# Patient Record
Sex: FEMALE | Race: OTHER | HISPANIC OR LATINO | Employment: UNEMPLOYED | URBAN - METROPOLITAN AREA
[De-identification: names, ages, dates, MRNs, and addresses within clinical notes are randomized per-mention and may not be internally consistent; named-entity substitution may affect disease eponyms.]

---

## 2018-04-09 ENCOUNTER — OFFICE VISIT (OUTPATIENT)
Dept: OBGYN CLINIC | Facility: HOSPITAL | Age: 28
End: 2018-04-09
Payer: COMMERCIAL

## 2018-04-09 VITALS
HEIGHT: 66 IN | SYSTOLIC BLOOD PRESSURE: 114 MMHG | WEIGHT: 125 LBS | HEART RATE: 87 BPM | BODY MASS INDEX: 20.09 KG/M2 | DIASTOLIC BLOOD PRESSURE: 74 MMHG

## 2018-04-09 DIAGNOSIS — R10.2 PELVIC PAIN IN FEMALE: ICD-10-CM

## 2018-04-09 DIAGNOSIS — K29.00 ACUTE GASTRITIS WITHOUT HEMORRHAGE, UNSPECIFIED GASTRITIS TYPE: Primary | ICD-10-CM

## 2018-04-09 PROBLEM — K29.70 GASTRITIS: Status: ACTIVE | Noted: 2018-04-09

## 2018-04-09 LAB — SL AMB POCT URINE HCG: NORMAL

## 2018-04-09 PROCEDURE — 99213 OFFICE O/P EST LOW 20 MIN: CPT | Performed by: OBSTETRICS & GYNECOLOGY

## 2018-04-09 PROCEDURE — 81025 URINE PREGNANCY TEST: CPT | Performed by: OBSTETRICS & GYNECOLOGY

## 2018-04-09 RX ORDER — OMEPRAZOLE 40 MG/1
40 CAPSULE, DELAYED RELEASE ORAL DAILY
Qty: 14 CAPSULE | Refills: 0 | Status: SHIPPED | OUTPATIENT
Start: 2018-04-09 | End: 2018-07-28 | Stop reason: ALTCHOICE

## 2018-04-09 NOTE — PATIENT INSTRUCTIONS
Diet for Stomach Ulcers and Gastritis   AMBULATORY CARE:   A diet for stomach ulcers and gastritis  is a meal plan that limits foods that irritate your stomach  Certain foods may worsen symptoms such as stomach pain, bloating, heartburn, or indigestion  Foods to limit or avoid:  You may need to avoid acidic, spicy, or high-fat foods  Not all foods affect everyone the same way  You will need to learn which foods worsen your symptoms and limit those foods  The following are some foods that may worsen ulcer or gastritis symptoms:  · Beverages:      ¨ Whole milk and chocolate milk    ¨ Hot cocoa and cola    ¨ Any beverage with caffeine    ¨ Regular and decaffeinated coffee    ¨ Peppermint and spearmint tea    ¨ Green and black tea, with or without caffeine    ¨ Orange and grapefruit juices    ¨ Drinks that contain alcohol    · Spices and seasonings:      ¨ Black and red pepper    ¨ Chili powder    ¨ Mustard seed and nutmeg    · Other foods:      ¨ Dairy foods made from whole milk or cream    ¨ Chocolate    ¨ Spicy or strongly flavored cheeses, such as jalapeno or black pepper    ¨ Highly seasoned, high-fat meats, such as sausage, salami, mae, ham, and cold cuts    ¨ Hot chiles and peppers    ¨ Tomato products, such as tomato paste, tomato sauce, or tomato juice  Foods to include:  Eat a variety of healthy foods from all the food groups  Eat fruits, vegetables, whole grains, and fat-free or low-fat dairy foods  Whole grains include whole-wheat breads, cereals, pasta, and brown rice  Choose lean meats, poultry (chicken and turkey), fish, beans, eggs, and nuts  A healthy meal plan is low in unhealthy fats, salt, and added sugar  Healthy fats include olive oil and canola oil  Ask your dietitian for more information about a healthy diet  Other helpful guidelines:   · Do not eat right before bedtime  Stop eating at least 2 hours before bedtime  · Eat small, frequent meals    Your stomach may tolerate small, frequent meals better than large meals  © 2017 [facility] [facility] Information is for End User's use only and may not be sold, redistributed or otherwise used for commercial purposes  All illustrations and images included in CareNotes® are the copyrighted property of A D A M [employer]  or [employer]  The above information is an  only  It is not intended as medical advice for individual conditions or treatments  Talk to your doctor, nurse or pharmacist before following any medical regimen to see if it is safe and effective for you

## 2018-04-09 NOTE — PROGRESS NOTES
34 yo female presenting with upper abdominal pain following eating pizza  Patient states 2 weeks ago she began having pain she localizes to her epigastric area which is exacerbated by eating "red sauce foods"  Patient rates the pain an 8/10 when the pain is there and does not radiate  It is associated with nausea  She has tried to take Tylenol and Motrin for the pain but does not make the pain worse  She denies any fevers, chills, diarrhea, constipation or dysuria  Patient states patient not have this problem after eating greasy foods or bland foods only when she is eating foods that contain as acid  Patient denies any current pain  Patient denies any medical problems or taking any medications other then Tylenol and Motrin  Patient is not have a primary care physician  /74 (BP Location: Left arm, Patient Position: Sitting, Cuff Size: Standard)   Pulse 87   Ht 5' 6" (1 676 m)   Wt 56 7 kg (125 lb)   LMP 03/29/2018 (Exact Date)   BMI 20 18 kg/m²   General:  Appears common in no acute distress  Abdomen:  Soft, nontender, no tenderness in all 4 quadrants, no rebound or guarding, negative Tobias sign, bowel sounds present    A/P:   56-year-old female presenting with epigastric pain most likely consistent with gastritis or peptic ulcer disease  - Omeprazole 40mg PO daily for 14 days Rx given  - Discussed dietary changes and avoid foods that containing acid, coffee, chocolate or regular NSAID use  Recommended healthy diet with abundant vegetables    - Ambulatory referral to family medicine for patient to establish care made- referral given  - If symptoms worsen or patient begins to have fever or chills with sx to present to ED for further evaluation  - To follow up for annual exam or as needed for OB or GYN related problems    D/w Dr Bethany Bocanegra MD

## 2018-07-28 ENCOUNTER — HOSPITAL ENCOUNTER (EMERGENCY)
Facility: HOSPITAL | Age: 28
Discharge: HOME | End: 2018-07-28
Attending: EMERGENCY MEDICINE | Admitting: EMERGENCY MEDICINE
Payer: COMMERCIAL

## 2018-07-28 ENCOUNTER — APPOINTMENT (EMERGENCY)
Dept: RADIOLOGY | Facility: HOSPITAL | Age: 28
End: 2018-07-28
Payer: COMMERCIAL

## 2018-07-28 VITALS
TEMPERATURE: 97.5 F | WEIGHT: 110 LBS | BODY MASS INDEX: 17.68 KG/M2 | RESPIRATION RATE: 18 BRPM | OXYGEN SATURATION: 93 % | DIASTOLIC BLOOD PRESSURE: 65 MMHG | HEIGHT: 66 IN | SYSTOLIC BLOOD PRESSURE: 115 MMHG | HEART RATE: 75 BPM

## 2018-07-28 DIAGNOSIS — N73.9 PELVIC INFLAMMATORY DISEASE (PID): Primary | ICD-10-CM

## 2018-07-28 LAB
BACTERIA UR QL AUTO: ABNORMAL /HPF
BASOPHILS # BLD AUTO: 0.05 THOUSANDS/ΜL (ref 0–0.1)
BASOPHILS NFR BLD AUTO: 1 % (ref 0–1)
BILIRUB UR QL STRIP: NEGATIVE
CLARITY UR: CLEAR
COLOR UR: YELLOW
COLOR, POC: ABNORMAL
EOSINOPHIL # BLD AUTO: 0.42 THOUSAND/ΜL (ref 0–0.61)
EOSINOPHIL NFR BLD AUTO: 5 % (ref 0–6)
ERYTHROCYTE [DISTWIDTH] IN BLOOD BY AUTOMATED COUNT: 12 % (ref 11.6–15.1)
EXT PREG TEST URINE: NEGATIVE
GLUCOSE UR STRIP-MCNC: NEGATIVE MG/DL
HCT VFR BLD AUTO: 40.1 % (ref 34.8–46.1)
HGB BLD-MCNC: 12.7 G/DL (ref 11.5–15.4)
HGB UR QL STRIP.AUTO: NEGATIVE
HYALINE CASTS #/AREA URNS LPF: ABNORMAL /LPF
IMM GRANULOCYTES # BLD AUTO: 0.02 THOUSAND/UL (ref 0–0.2)
IMM GRANULOCYTES NFR BLD AUTO: 0 % (ref 0–2)
KETONES UR STRIP-MCNC: NEGATIVE MG/DL
LEUKOCYTE ESTERASE UR QL STRIP: ABNORMAL
LYMPHOCYTES # BLD AUTO: 3.55 THOUSANDS/ΜL (ref 0.6–4.47)
LYMPHOCYTES NFR BLD AUTO: 43 % (ref 14–44)
MCH RBC QN AUTO: 29.3 PG (ref 26.8–34.3)
MCHC RBC AUTO-ENTMCNC: 31.7 G/DL (ref 31.4–37.4)
MCV RBC AUTO: 93 FL (ref 82–98)
MONOCYTES # BLD AUTO: 0.39 THOUSAND/ΜL (ref 0.17–1.22)
MONOCYTES NFR BLD AUTO: 5 % (ref 4–12)
NEUTROPHILS # BLD AUTO: 3.77 THOUSANDS/ΜL (ref 1.85–7.62)
NEUTS SEG NFR BLD AUTO: 46 % (ref 43–75)
NITRITE UR QL STRIP: NEGATIVE
NON-SQ EPI CELLS URNS QL MICRO: ABNORMAL /HPF
NRBC BLD AUTO-RTO: 0 /100 WBCS
PH UR STRIP.AUTO: 6 [PH] (ref 4.5–8)
PLATELET # BLD AUTO: 260 THOUSANDS/UL (ref 149–390)
PMV BLD AUTO: 10.3 FL (ref 8.9–12.7)
PROT UR STRIP-MCNC: NEGATIVE MG/DL
RBC # BLD AUTO: 4.33 MILLION/UL (ref 3.81–5.12)
RBC #/AREA URNS AUTO: ABNORMAL /HPF
SP GR UR STRIP.AUTO: >=1.03 (ref 1–1.03)
UROBILINOGEN UR QL STRIP.AUTO: 0.2 E.U./DL
WBC # BLD AUTO: 8.2 THOUSAND/UL (ref 4.31–10.16)
WBC #/AREA URNS AUTO: ABNORMAL /HPF

## 2018-07-28 PROCEDURE — 96372 THER/PROPH/DIAG INJ SC/IM: CPT

## 2018-07-28 PROCEDURE — 87591 N.GONORRHOEAE DNA AMP PROB: CPT | Performed by: EMERGENCY MEDICINE

## 2018-07-28 PROCEDURE — 81001 URINALYSIS AUTO W/SCOPE: CPT

## 2018-07-28 PROCEDURE — 87491 CHLMYD TRACH DNA AMP PROBE: CPT | Performed by: EMERGENCY MEDICINE

## 2018-07-28 PROCEDURE — 87086 URINE CULTURE/COLONY COUNT: CPT

## 2018-07-28 PROCEDURE — 99284 EMERGENCY DEPT VISIT MOD MDM: CPT

## 2018-07-28 PROCEDURE — 85025 COMPLETE CBC W/AUTO DIFF WBC: CPT | Performed by: EMERGENCY MEDICINE

## 2018-07-28 PROCEDURE — 36415 COLL VENOUS BLD VENIPUNCTURE: CPT | Performed by: EMERGENCY MEDICINE

## 2018-07-28 PROCEDURE — 76856 US EXAM PELVIC COMPLETE: CPT

## 2018-07-28 PROCEDURE — 76830 TRANSVAGINAL US NON-OB: CPT

## 2018-07-28 PROCEDURE — 81025 URINE PREGNANCY TEST: CPT | Performed by: EMERGENCY MEDICINE

## 2018-07-28 RX ORDER — AZITHROMYCIN 250 MG/1
1000 TABLET, FILM COATED ORAL ONCE
Status: DISCONTINUED | OUTPATIENT
Start: 2018-07-28 | End: 2018-07-28

## 2018-07-28 RX ORDER — DOXYCYCLINE HYCLATE 100 MG/1
100 CAPSULE ORAL ONCE
Status: COMPLETED | OUTPATIENT
Start: 2018-07-28 | End: 2018-07-28

## 2018-07-28 RX ORDER — METRONIDAZOLE 500 MG/1
500 TABLET ORAL 2 TIMES DAILY
Qty: 28 TABLET | Refills: 0 | Status: SHIPPED | OUTPATIENT
Start: 2018-07-28 | End: 2018-08-11

## 2018-07-28 RX ORDER — NAPROXEN 375 MG/1
375 TABLET ORAL 2 TIMES DAILY WITH MEALS
Qty: 20 TABLET | Refills: 0 | Status: SHIPPED | OUTPATIENT
Start: 2018-07-28 | End: 2019-06-25

## 2018-07-28 RX ORDER — DOXYCYCLINE HYCLATE 100 MG/1
100 CAPSULE ORAL 2 TIMES DAILY
Qty: 28 CAPSULE | Refills: 0 | Status: SHIPPED | OUTPATIENT
Start: 2018-07-28 | End: 2018-08-11

## 2018-07-28 RX ADMIN — HYDROMORPHONE HYDROCHLORIDE 1 MG: 1 INJECTION, SOLUTION INTRAMUSCULAR; INTRAVENOUS; SUBCUTANEOUS at 21:01

## 2018-07-28 RX ADMIN — LIDOCAINE HYDROCHLORIDE 250 MG: 10 INJECTION, SOLUTION EPIDURAL; INFILTRATION; INTRACAUDAL; PERINEURAL at 22:19

## 2018-07-28 RX ADMIN — DOXYCYCLINE 100 MG: 100 CAPSULE ORAL at 22:20

## 2018-07-28 NOTE — Clinical Note
[address] [facility] discharge to home/self care      Condition at discharge: Stable

## 2018-07-29 NOTE — ED ATTENDING ATTESTATION
I, Elle Patel DO, saw and evaluated the patient  I have discussed the patient with the resident/non-physician practitioner and agree with the resident's/non-physician practitioner's findings, Plan of Care, and MDM as documented in the resident's/non-physician practitioner's note, except where noted  All available labs and Radiology studies were reviewed  At this point I agree with the current assessment done in the Emergency Department  I have conducted an independent evaluation of this patient a history and physical is as follows:      Critical Care Time  CritCare Time    Procedures     33 yr old fem to the ED with pelvic pain for the last five days wo fever  Has clear to [facility] and boyfriend with the same  Hx of Chlamydia two years ago  No vomiting  Exm: very tender suprapubic with guarding    Pln: STD and PID eval
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
## 2018-07-29 NOTE — DISCHARGE INSTRUCTIONS
Pelvic Inflammatory Disease   WHAT YOU NEED TO KNOW:   Pelvic inflammatory disease (PID) is a condition that causes your reproductive organs to become inflamed  Your reproductive organs include your ovaries, fallopian tubes, uterus, cervix (lower area of your uterus), and vagina  PID may cause chronic (long-term) abdominal pain and problems with future pregnancies  You may have no symptoms of PID  DISCHARGE INSTRUCTIONS:   Return to the emergency department if:   · You have chills or a high fever  · You have pain in your upper right abdomen  · You have pain in your lower abdomen that does not go away with rest or medicine  · Your symptoms get worse or do not improve after 3 days of treatment  Contact your healthcare provider if:   · You have nausea or are vomiting  · Your skin is red, itchy, or you have a new rash  · You think or know you are pregnant  · You have questions or concerns about your condition or care  Medicines:   · Medicines  may be given to prevent or fight a bacterial infection or to reduce pain  Ask your healthcare provider how to take pain medicine safely  · Take your medicine as directed  Contact your healthcare provider if you think your medicine is not helping or if you have side effects  Tell him of her if you are allergic to any medicine  Keep a list of the medicines, vitamins, and herbs you take  Include the amounts, and when and why you take them  Bring the list or the pill bottles to follow-up visits  Carry your medicine list with you in case of an emergency  Manage PID:   · Finish your treatment  If you do not finish your treatment for PID, your infection may not go away  You may also have an increased risk for another STI in the future  · Do not have sex until your healthcare provider says it is okay  You will need to finish treatment before it is safe to have sex  · Do not have unprotected sex  Always use a latex condom   Do not have sex while you or your partners are being treated for an STI  · Talk to your sex partners  If you have an STI, tell your recent partners  Tell them to see a healthcare provider for testing and treatment  This will help stop the spread of infection to others or back to you  Decrease your risk for PID:   · Do not have unprotected sex  Always use a latex condom  Do not have sex while you or your partners are being treated for an STI  · Get tested for STIs  before and after new sex partners  Talk to your partner and ask them to get tested  · Do not delay treatment for STIs or vaginal infections  Talk to your healthcare provider if you think you have an STI  Early treatment can prevent health problems that may lead to PID  Follow up with your healthcare provider as directed: You may need a follow up visit a few days after you start your treatment  Your healthcare provider may ask you if your recent sexual partners have also been treated for an STI  You may need more tests if your symptoms do not go away or worsen after treatment  Your treatment may need to be changed if your symptoms are not getting better  Write down your questions so you remember to ask them during your visits  © 2017 [address] [facility] Information is for End User's use only and may not be sold, redistributed or otherwise used for commercial purposes  All illustrations and images included in CareNotes® are the copyrighted property of A D A M , Inc  or [employer]  The above information is an  only  It is not intended as medical advice for individual conditions or treatments  Talk to your doctor, nurse or pharmacist before following any medical regimen to see if it is safe and effective for you

## 2018-07-30 LAB
BACTERIA UR CULT: NORMAL
CHLAMYDIA DNA CVX QL NAA+PROBE: ABNORMAL
N GONORRHOEA DNA GENITAL QL NAA+PROBE: ABNORMAL

## 2019-03-05 ENCOUNTER — HOSPITAL ENCOUNTER (EMERGENCY)
Facility: HOSPITAL | Age: 29
Discharge: HOME | End: 2019-03-05
Attending: EMERGENCY MEDICINE
Payer: COMMERCIAL

## 2019-03-05 VITALS
TEMPERATURE: 97.8 F | OXYGEN SATURATION: 98 % | SYSTOLIC BLOOD PRESSURE: 140 MMHG | RESPIRATION RATE: 20 BRPM | DIASTOLIC BLOOD PRESSURE: 58 MMHG | HEART RATE: 65 BPM | WEIGHT: 115 LBS | BODY MASS INDEX: 18.56 KG/M2

## 2019-03-05 DIAGNOSIS — F11.23 HEROIN WITHDRAWAL (HCC): ICD-10-CM

## 2019-03-05 DIAGNOSIS — R11.2 NAUSEA AND VOMITING: Primary | ICD-10-CM

## 2019-03-05 LAB
ALBUMIN SERPL BCP-MCNC: 3.7 G/DL (ref 3.5–5)
ALP SERPL-CCNC: 63 U/L (ref 46–116)
ALT SERPL W P-5'-P-CCNC: 51 U/L (ref 12–78)
AMPHETAMINES SERPL QL SCN: NEGATIVE
ANION GAP SERPL CALCULATED.3IONS-SCNC: 8 MMOL/L (ref 4–13)
AST SERPL W P-5'-P-CCNC: 27 U/L (ref 5–45)
BACTERIA UR QL AUTO: ABNORMAL /HPF
BARBITURATES UR QL: NEGATIVE
BASOPHILS # BLD AUTO: 0.03 THOUSANDS/ΜL (ref 0–0.1)
BASOPHILS NFR BLD AUTO: 0 % (ref 0–1)
BENZODIAZ UR QL: NEGATIVE
BILIRUB SERPL-MCNC: 0.89 MG/DL (ref 0.2–1)
BILIRUB UR QL STRIP: ABNORMAL
BUN SERPL-MCNC: 21 MG/DL (ref 5–25)
CALCIUM SERPL-MCNC: 7.9 MG/DL (ref 8.3–10.1)
CHLORIDE SERPL-SCNC: 102 MMOL/L (ref 100–108)
CLARITY UR: CLEAR
CO2 SERPL-SCNC: 25 MMOL/L (ref 21–32)
COARSE GRAN CASTS URNS QL MICRO: ABNORMAL /LPF
COCAINE UR QL: NEGATIVE
COLOR UR: YELLOW
COLOR, POC: YELLOW
CREAT SERPL-MCNC: 0.67 MG/DL (ref 0.6–1.3)
EOSINOPHIL # BLD AUTO: 0.03 THOUSAND/ΜL (ref 0–0.61)
EOSINOPHIL NFR BLD AUTO: 0 % (ref 0–6)
ERYTHROCYTE [DISTWIDTH] IN BLOOD BY AUTOMATED COUNT: 11.9 % (ref 11.6–15.1)
ETHANOL EXG-MCNC: 0 MG/DL
EXT PREG TEST URINE: NORMAL
FINE GRAN CASTS URNS QL MICRO: ABNORMAL /LPF
GFR SERPL CREATININE-BSD FRML MDRD: 121 ML/MIN/1.73SQ M
GLUCOSE SERPL-MCNC: 83 MG/DL (ref 65–140)
GLUCOSE UR STRIP-MCNC: NEGATIVE MG/DL
HCT VFR BLD AUTO: 40.9 % (ref 34.8–46.1)
HGB BLD-MCNC: 13.3 G/DL (ref 11.5–15.4)
HGB UR QL STRIP.AUTO: NEGATIVE
IMM GRANULOCYTES # BLD AUTO: 0.03 THOUSAND/UL (ref 0–0.2)
IMM GRANULOCYTES NFR BLD AUTO: 0 % (ref 0–2)
KETONES UR STRIP-MCNC: ABNORMAL MG/DL
LEUKOCYTE ESTERASE UR QL STRIP: ABNORMAL
LIPASE SERPL-CCNC: 81 U/L (ref 73–393)
LYMPHOCYTES # BLD AUTO: 1.63 THOUSANDS/ΜL (ref 0.6–4.47)
LYMPHOCYTES NFR BLD AUTO: 15 % (ref 14–44)
MCH RBC QN AUTO: 28.7 PG (ref 26.8–34.3)
MCHC RBC AUTO-ENTMCNC: 32.5 G/DL (ref 31.4–37.4)
MCV RBC AUTO: 88 FL (ref 82–98)
METHADONE UR QL: NEGATIVE
MONOCYTES # BLD AUTO: 0.79 THOUSAND/ΜL (ref 0.17–1.22)
MONOCYTES NFR BLD AUTO: 7 % (ref 4–12)
NEUTROPHILS # BLD AUTO: 8.23 THOUSANDS/ΜL (ref 1.85–7.62)
NEUTS SEG NFR BLD AUTO: 78 % (ref 43–75)
NITRITE UR QL STRIP: NEGATIVE
NON-SQ EPI CELLS URNS QL MICRO: ABNORMAL /HPF
NRBC BLD AUTO-RTO: 0 /100 WBCS
OPIATES UR QL SCN: POSITIVE
PCP UR QL: NEGATIVE
PH UR STRIP.AUTO: 5.5 [PH] (ref 4.5–8)
PLATELET # BLD AUTO: 275 THOUSANDS/UL (ref 149–390)
PMV BLD AUTO: 10.1 FL (ref 8.9–12.7)
POTASSIUM SERPL-SCNC: 3.1 MMOL/L (ref 3.5–5.3)
PROT SERPL-MCNC: 7.3 G/DL (ref 6.4–8.2)
PROT UR STRIP-MCNC: ABNORMAL MG/DL
RBC # BLD AUTO: 4.64 MILLION/UL (ref 3.81–5.12)
RBC #/AREA URNS AUTO: ABNORMAL /HPF
SODIUM SERPL-SCNC: 135 MMOL/L (ref 136–145)
SP GR UR STRIP.AUTO: 1.02 (ref 1–1.03)
THC UR QL: POSITIVE
UROBILINOGEN UR QL STRIP.AUTO: 0.2 E.U./DL
WBC # BLD AUTO: 10.74 THOUSAND/UL (ref 4.31–10.16)
WBC #/AREA URNS AUTO: ABNORMAL /HPF

## 2019-03-05 PROCEDURE — 96361 HYDRATE IV INFUSION ADD-ON: CPT

## 2019-03-05 PROCEDURE — 87086 URINE CULTURE/COLONY COUNT: CPT

## 2019-03-05 PROCEDURE — 96375 TX/PRO/DX INJ NEW DRUG ADDON: CPT

## 2019-03-05 PROCEDURE — 81025 URINE PREGNANCY TEST: CPT | Performed by: EMERGENCY MEDICINE

## 2019-03-05 PROCEDURE — 80053 COMPREHEN METABOLIC PANEL: CPT | Performed by: EMERGENCY MEDICINE

## 2019-03-05 PROCEDURE — 81001 URINALYSIS AUTO W/SCOPE: CPT

## 2019-03-05 PROCEDURE — 85025 COMPLETE CBC W/AUTO DIFF WBC: CPT | Performed by: EMERGENCY MEDICINE

## 2019-03-05 PROCEDURE — 96374 THER/PROPH/DIAG INJ IV PUSH: CPT

## 2019-03-05 PROCEDURE — 82075 ASSAY OF BREATH ETHANOL: CPT | Performed by: EMERGENCY MEDICINE

## 2019-03-05 PROCEDURE — 99284 EMERGENCY DEPT VISIT MOD MDM: CPT

## 2019-03-05 PROCEDURE — 80307 DRUG TEST PRSMV CHEM ANLYZR: CPT | Performed by: EMERGENCY MEDICINE

## 2019-03-05 PROCEDURE — 83690 ASSAY OF LIPASE: CPT | Performed by: EMERGENCY MEDICINE

## 2019-03-05 PROCEDURE — 81003 URINALYSIS AUTO W/O SCOPE: CPT

## 2019-03-05 PROCEDURE — 36415 COLL VENOUS BLD VENIPUNCTURE: CPT | Performed by: EMERGENCY MEDICINE

## 2019-03-05 RX ORDER — ONDANSETRON 2 MG/ML
INJECTION INTRAMUSCULAR; INTRAVENOUS
Status: COMPLETED
Start: 2019-03-05 | End: 2019-03-05

## 2019-03-05 RX ORDER — CEPHALEXIN 250 MG/1
500 CAPSULE ORAL 4 TIMES DAILY
Qty: 40 CAPSULE | Refills: 0 | Status: SHIPPED | OUTPATIENT
Start: 2019-03-05 | End: 2019-03-10

## 2019-03-05 RX ORDER — ONDANSETRON 2 MG/ML
4 INJECTION INTRAMUSCULAR; INTRAVENOUS ONCE
Status: COMPLETED | OUTPATIENT
Start: 2019-03-05 | End: 2019-03-05

## 2019-03-05 RX ORDER — ONDANSETRON 4 MG/1
4 TABLET, ORALLY DISINTEGRATING ORAL EVERY 8 HOURS SCHEDULED
Qty: 10 TABLET | Refills: 0 | Status: SHIPPED | OUTPATIENT
Start: 2019-03-05 | End: 2019-06-25

## 2019-03-05 RX ORDER — METOCLOPRAMIDE HYDROCHLORIDE 5 MG/ML
10 INJECTION INTRAMUSCULAR; INTRAVENOUS ONCE
Status: COMPLETED | OUTPATIENT
Start: 2019-03-05 | End: 2019-03-05

## 2019-03-05 RX ORDER — CLONIDINE HYDROCHLORIDE 0.1 MG/1
0.1 TABLET ORAL ONCE
Status: COMPLETED | OUTPATIENT
Start: 2019-03-05 | End: 2019-03-05

## 2019-03-05 RX ORDER — ONDANSETRON 4 MG/1
4 TABLET, ORALLY DISINTEGRATING ORAL EVERY 8 HOURS PRN
Qty: 10 TABLET | Refills: 0 | Status: SHIPPED | OUTPATIENT
Start: 2019-03-05 | End: 2019-06-25

## 2019-03-05 RX ORDER — POTASSIUM CHLORIDE 20 MEQ/1
40 TABLET, EXTENDED RELEASE ORAL ONCE
Status: COMPLETED | OUTPATIENT
Start: 2019-03-05 | End: 2019-03-05

## 2019-03-05 RX ADMIN — METOCLOPRAMIDE 10 MG: 5 INJECTION, SOLUTION INTRAMUSCULAR; INTRAVENOUS at 13:43

## 2019-03-05 RX ADMIN — SODIUM CHLORIDE 1000 ML: 0.9 INJECTION, SOLUTION INTRAVENOUS at 13:43

## 2019-03-05 RX ADMIN — CLONIDINE HYDROCHLORIDE 0.1 MG: 0.1 TABLET ORAL at 14:05

## 2019-03-05 RX ADMIN — POTASSIUM CHLORIDE 40 MEQ: 1500 TABLET, EXTENDED RELEASE ORAL at 18:57

## 2019-03-05 RX ADMIN — ONDANSETRON 4 MG: 2 INJECTION INTRAMUSCULAR; INTRAVENOUS at 12:40

## 2019-03-05 NOTE — ED NOTES
Notes she has not had anything to eat in 3d  "cannot keep down water"     Tasha Resendez, KARYNA  03/05/19 [id number]
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
## 2019-03-05 NOTE — ED PROVIDER NOTES
History  Chief Complaint   Patient presents with    Vomiting     pt notes vomiting for past 3d  Flu like symptoms started 3d ago   Flu Symptoms     Nausea 3 days - lots of emesis, non bloody  Not tolerating liquid or solids  Feels super dry  Patient used Heroin 5 days ago  She has never had withdrawal symptoms  Always snorts - prior use was  3-4 months ago  She was prompted by depression  Her boyfriend who she lives with and his family also use heroin so this makes it difficult for to stay off  No SI HI  She is taking heroin to get high not hurt herself  She does admittedly have prior temp sec cutting herself and hanging herself  She was previously treated for depression with Prozac for 6 years but had refractory depression  She does not currently follow up  Her withdrawal this time has been primarily Nausea refractory to zofran ODT that she got from a friend  She is also experiencing Chills, suprapubic pain  Decreased urine, no dysuria  Feels different than prior UTIs      Sexually active, no concern for STI, condom every time  Last period 2 5 weeks ago normal  Irregular periods at baseline  1 prior pregnancy - vaginal, full term, no complication  6year old    No flu vaccine this year      Prior history of heavy drinker, "real heavy"  Marijuana smoked a week ago, normally doesn't get nausea with marijuana smoking                    Prior to Admission Medications   Prescriptions Last Dose Informant Patient Reported? Taking?   naproxen (NAPROSYN) 375 mg tablet Not Taking at Unknown time  No No   Sig: Take 1 tablet (375 mg total) by mouth 2 (two) times a day with meals   Patient not taking: Reported on 3/5/2019      Facility-Administered Medications: None       History reviewed  No pertinent past medical history  History reviewed  No pertinent surgical history  History reviewed  No pertinent family history  I have reviewed and agree with the history as documented      Social History     Tobacco Use  Smoking status: Current Every Day Smoker     Types: Cigarettes    Smokeless tobacco: Never Used   Substance Use Topics    Alcohol use: No    Drug use: Yes     Types: Marijuana        Review of Systems   Constitutional: Positive for chills  Negative for activity change, appetite change, diaphoresis, fatigue and fever  HENT: Negative for congestion, rhinorrhea, sinus pressure, sinus pain, sneezing, sore throat, trouble swallowing and voice change  Eyes: Negative for visual disturbance  Respiratory: Negative for choking, chest tightness, shortness of breath, wheezing and stridor  Cardiovascular: Negative for chest pain, palpitations and leg swelling  Gastrointestinal: Positive for abdominal distention, nausea and vomiting  Negative for abdominal pain and blood in stool  Endocrine: Negative for polyuria  Genitourinary: Positive for decreased urine volume  Negative for difficulty urinating, dyspareunia, dysuria, enuresis, flank pain, frequency, hematuria and vaginal bleeding  Musculoskeletal: Negative for arthralgias, gait problem, neck pain and neck stiffness  Skin: Negative for color change and rash  Allergic/Immunologic: Negative for food allergies  Neurological: Negative for dizziness, tremors, speech difficulty, weakness, light-headedness and headaches  Psychiatric/Behavioral: Negative for confusion  The patient is not nervous/anxious          Physical Exam  ED Triage Vitals   Temperature Pulse Respirations Blood Pressure SpO2   03/05/19 1132 03/05/19 1135 03/05/19 1135 03/05/19 1135 03/05/19 1135   97 8 °F (36 6 °C) 71 20 109/61 99 %      Temp Source Heart Rate Source Patient Position - Orthostatic VS BP Location FiO2 (%)   03/05/19 1132 03/05/19 1135 03/05/19 1135 03/05/19 1135 --   Oral Monitor Sitting Right arm       Pain Score       03/05/19 1132       9           Orthostatic Vital Signs  Vitals:    03/05/19 1530 03/05/19 1608 03/05/19 1645 03/05/19 1716   BP: 128/59 123/60 116/59 140/58   Pulse: 79 71 65    Patient Position - Orthostatic VS: Sitting Sitting Sitting Sitting       Physical Exam   Constitutional: She is oriented to person, place, and time  She appears well-developed and well-nourished  She appears distressed  Cachectic   HENT:   Head: Normocephalic and atraumatic  Right Ear: External ear normal    Left Ear: External ear normal    Nose: Nose normal    Eyes: Pupils are equal, round, and reactive to light  Conjunctivae and EOM are normal  No scleral icterus  Neck: Normal range of motion  Neck supple  Cardiovascular: Normal rate, regular rhythm, normal heart sounds and intact distal pulses  No murmur heard  Pulmonary/Chest: Effort normal and breath sounds normal  No stridor  No respiratory distress  She has no wheezes  She has no rales  Abdominal: Soft  Bowel sounds are normal  She exhibits no distension and no mass  There is tenderness (Suprapubic)  There is no rebound and no guarding  Musculoskeletal: Normal range of motion  She exhibits no edema, tenderness or deformity  Kyphotic   Lymphadenopathy:     She has no cervical adenopathy  Neurological: She is alert and oriented to person, place, and time  No cranial nerve deficit or sensory deficit  Skin: Skin is warm and dry  Capillary refill takes 2 to 3 seconds  No rash noted  She is not diaphoretic  Psychiatric: She has a normal mood and affect  Her behavior is normal  Judgment and thought content normal    Nursing note and vitals reviewed        ED Medications  Medications   ondansetron (ZOFRAN) injection 4 mg (0 mg Intravenous Override Pull 3/5/19 1253)   sodium chloride 0 9 % bolus 1,000 mL (0 mL Intravenous Stopped 3/5/19 1456)   metoclopramide (REGLAN) injection 10 mg (10 mg Intravenous Given 3/5/19 1343)   cloNIDine (CATAPRES) tablet 0 1 mg (0 1 mg Oral Given 3/5/19 1405)   potassium chloride (K-DUR,KLOR-CON) CR tablet 40 mEq (40 mEq Oral Given 3/5/19 7007)       Diagnostic Studies  Results Reviewed     Procedure Component Value Units Date/Time    Urine culture [[id number]] Collected:  03/05/19 1458    Lab Status:  Final result Specimen:  Urine, Clean Catch Updated:  03/06/19 1215     Urine Culture No Growth <1000 cfu/mL    Urine Microscopic [[id number]]  (Abnormal) Collected:  03/05/19 1458    Lab Status:  Final result Specimen:  Urine, Clean Catch Updated:  03/05/19 1626     RBC, UA None Seen /hpf      WBC, UA 30-50 /hpf      Epithelial Cells Moderate /hpf      Bacteria, UA Occasional /hpf      Fine granular casts 0-3 /lpf      COARSE GRANULAR CASTS 0-3 /lpf     Rapid drug screen, urine [[id number]]  (Abnormal) Collected:  03/05/19 1455    Lab Status:  Final result Specimen:  Urine, Clean Catch Updated:  03/05/19 1543     Amph/Meth UR Negative     Barbiturate Ur Negative     Benzodiazepine Urine Negative     Cocaine Urine Negative     Methadone Urine Negative     Opiate Urine Positive     PCP Ur Negative     THC Urine Positive    Narrative:       Presumptive report  If requested, specimen will be sent to reference lab for confirmation  FOR MEDICAL PURPOSES ONLY  IF CONFIRMATION NEEDED PLEASE CONTACT THE LAB WITHIN 5 DAYS    Drug Screen Cutoff Levels:  AMPHETAMINE/METHAMPHETAMINES  1000 ng/mL  BARBITURATES     200 ng/mL  BENZODIAZEPINES     200 ng/mL  COCAINE      300 ng/mL  METHADONE      300 ng/mL  OPIATES      300 ng/mL  PHENCYCLIDINE     25 ng/mL  THC       50 ng/mL    POCT urinalysis dipstick [[id number]]  (Normal) Resulted:  03/05/19 1456    Lab Status:  Final result Specimen:  Urine Updated:  03/05/19 1456     Color, UA yellow    POCT pregnancy, urine [[id number]]  (Normal) Resulted:  03/05/19 1456    Lab Status:  Final result Updated:  03/05/19 1456     EXT PREG TEST UR (Ref: Negative) neg    ED Urine Macroscopic [[id number]]  (Abnormal) Collected:  03/05/19 1458    Lab Status:  Final result Specimen:  Urine Updated:  03/05/19 1454     Color, UA Yellow     Clarity, UA Clear     pH, UA 5 5 Leukocytes, UA Small     Nitrite, UA Negative     Protein,  (2+) mg/dl      Glucose, UA Negative mg/dl      Ketones, UA 80 (3+) mg/dl      Urobilinogen, UA 0 2 E U /dl      Bilirubin, UA Interference- unable to analyze     Blood, UA Negative     Specific Gravity, UA 1 025    Narrative:       CLINITEK RESULT    Comprehensive metabolic panel [[id number]]  (Abnormal) Collected:  03/05/19 1409    Lab Status:  Final result Specimen:  Blood from Arm, Left Updated:  03/05/19 1440     Sodium 135 mmol/L      Potassium 3 1 mmol/L      Chloride 102 mmol/L      CO2 25 mmol/L      ANION GAP 8 mmol/L      BUN 21 mg/dL      Creatinine 0 67 mg/dL      Glucose 83 mg/dL      Calcium 7 9 mg/dL      AST 27 U/L      ALT 51 U/L      Alkaline Phosphatase 63 U/L      Total Protein 7 3 g/dL      Albumin 3 7 g/dL      Total Bilirubin 0 89 mg/dL      eGFR 121 ml/min/1 73sq m     Narrative:       National Kidney Disease Education Program recommendations are as follows:  GFR calculation is accurate only with a steady state creatinine  Chronic Kidney disease less than 60 ml/min/1 73 sq  meters  Kidney failure less than 15 ml/min/1 73 sq  meters      Lipase [[id number]]  (Normal) Collected:  03/05/19 1409    Lab Status:  Final result Specimen:  Blood from Arm, Left Updated:  03/05/19 1440     Lipase 81 u/L     CBC and differential [[id number]]  (Abnormal) Collected:  03/05/19 1409    Lab Status:  Final result Specimen:  Blood from Arm, Left Updated:  03/05/19 1418     WBC 10 74 Thousand/uL      RBC 4 64 Million/uL      Hemoglobin 13 3 g/dL      Hematocrit 40 9 %      MCV 88 fL      MCH 28 7 pg      MCHC 32 5 g/dL      RDW 11 9 %      MPV 10 1 fL      Platelets 405 Thousands/uL      nRBC 0 /100 WBCs      Neutrophils Relative 78 %      Immat GRANS % 0 %      Lymphocytes Relative 15 %      Monocytes Relative 7 %      Eosinophils Relative 0 %      Basophils Relative 0 %      Neutrophils Absolute 8 23 Thousands/µL      Immature Grans Absolute 0 03 Thousand/uL      Lymphocytes Absolute 1 63 Thousands/µL      Monocytes Absolute 0 79 Thousand/µL      Eosinophils Absolute 0 03 Thousand/µL      Basophils Absolute 0 03 Thousands/µL     POCT alcohol breath test [[id number]]  (Normal) Resulted:  03/05/19 1404    Lab Status:  Final result Updated:  03/05/19 1404     EXTBreath Alcohol 0 000                 No orders to display         Procedures  Procedures      Phone Consults  ED Phone Contact    ED Course                               MDM  Number of Diagnoses or Management Options  Heroin withdrawal ([facility] [address] ):   Nausea and vomiting:   Diagnosis management comments: Patient saw crisis was set up with outpatient treatment  She declined inpatient treatment    She was sent home with Zofran and return precautions    Patient was given potassium    Patient was given a prescription for urinary tract infection and Keflex treatment      Disposition  Final diagnoses:   Nausea and vomiting   Heroin withdrawal ([facility] [address] )     Time reflects when diagnosis was documented in both MDM as applicable and the Disposition within this note     Time User Action Codes Description Comment    3/5/2019  6:45 PM Francenia Raw Add [R11 2] Nausea and vomiting     3/5/2019  6:45 PM Francenia Raw Add [F11 23] Heroin withdrawal [facility])       ED Disposition     ED Disposition Condition Date/Time Comment    Discharge Stable Tue Mar 5, 2238  1:06 PM Jarrod Guajardo discharge to home/self care              Follow-up Information     Follow up With Specialties Details Why Contact Info    Gay Olivares MD Family Medicine Call in 1 day As needed [address]   [phone]            Discharge Medication List as of 3/5/2019  6:47 PM      START taking these medications    Details   ondansetron (ZOFRAN-ODT) 4 mg disintegrating tablet Take 1 tablet (4 mg total) by mouth every 8 (eight) hours as needed for nausea or vomiting for up to 10 doses, Starting Tue 3/5/2019, Print CONTINUE these medications which have NOT CHANGED    Details   naproxen (NAPROSYN) 375 mg tablet Take 1 tablet (375 mg total) by mouth 2 (two) times a day with meals, Starting Sat 7/28/2018, Print           No discharge procedures on file  ED Provider  Attending physically available and evaluated Nuha Terrazas I managed the patient along with the ED Attending      Electronically Signed by         Delicia Obando MD  03/07/19 9135

## 2019-03-05 NOTE — ED NOTES
Pt aware of need for urine sample but states    She has not been able to pee in 3d, she does not have to go yet     Zoran Estrada RN  03/05/19 9018

## 2019-03-05 NOTE — ED ATTENDING ATTESTATION
Solange Stringer MD, saw and evaluated the patient  I have discussed the patient with the resident/non-physician practitioner and agree with the resident's/non-physician practitioner's findings, Plan of Care, and MDM as documented in the resident's/non-physician practitioner's note, except where noted  All available labs and Radiology studies were reviewed  I was present for key portions of any procedure(s) performed by the resident/non-physician practitioner and I was immediately available to provide assistance  At this point I agree with the current assessment done in the Emergency Department  I have conducted an independent evaluation of this patient a history and physical is as follows:      Critical Care Time  Procedures     32 yo female with vomiting for last three days, dry heaving, no bm in 3 days, decreased po intake and urination  Pt with hx of heroin abuse and used last 5 days ago  No fever, positive chills  No urinary complaints  No other pmh  Vss, afebrile, lungs cta, rrr, abdomen soft suprapubic tenderness  Urine, urine preg, uds, zofran, reglan, clonidine, ivf

## 2019-03-05 NOTE — ED NOTES
Call placed to [facility], spoke with Rio Gonzalez to request evaluation for inpatient D&A treatment  Rio Gonzalez states that someone should be in to meet with patient in about 30 minutes  Patient made aware, states that she would like to go for treatment tomorrow after a visit she has scheduled with her daughter  Patient will discuss this with [facility] representative during assessment but ultimately would like to be discharged from ED today      DEB Tan  03/05/19   5251

## 2019-03-05 NOTE — ED NOTES
Pt dry heaving with emesis bag up to mouth   Zofran override pull     Holly Roy, KARYNA  03/05/19 5095

## 2019-03-05 NOTE — ED NOTES
Spoke with Judi Mckinney, from [facility], stating she met with patient and completed assessment  At this time patient declined inpatient treatment and was provided with outpatient resources  Patient will be able to contact [facility] or return to the ED if she does wish to pursue inpatient treatment       DEB Cruz  03/05/19   6849

## 2019-03-05 NOTE — ED NOTES
Left a message for [facility] requesting a return call regarding someone coming to meet with patient       DEB Hall  03/05/19   6815

## 2019-03-06 ENCOUNTER — HOSPITAL ENCOUNTER (EMERGENCY)
Facility: HOSPITAL | Age: 29
Discharge: HOME | End: 2019-03-06
Attending: EMERGENCY MEDICINE | Admitting: EMERGENCY MEDICINE
Payer: COMMERCIAL

## 2019-03-06 VITALS
OXYGEN SATURATION: 96 % | DIASTOLIC BLOOD PRESSURE: 58 MMHG | SYSTOLIC BLOOD PRESSURE: 119 MMHG | HEART RATE: 77 BPM | TEMPERATURE: 98.1 F | RESPIRATION RATE: 18 BRPM

## 2019-03-06 DIAGNOSIS — E86.0 DEHYDRATION: ICD-10-CM

## 2019-03-06 DIAGNOSIS — R11.2 NAUSEA & VOMITING: Primary | ICD-10-CM

## 2019-03-06 LAB
ANION GAP SERPL CALCULATED.3IONS-SCNC: 5 MMOL/L (ref 4–13)
BACTERIA UR CULT: NORMAL
BACTERIA UR QL AUTO: ABNORMAL /HPF
BASOPHILS # BLD AUTO: 0.03 THOUSANDS/ΜL (ref 0–0.1)
BASOPHILS NFR BLD AUTO: 0 % (ref 0–1)
BILIRUB UR QL STRIP: NEGATIVE
BUN SERPL-MCNC: 17 MG/DL (ref 5–25)
CALCIUM SERPL-MCNC: 8.8 MG/DL (ref 8.3–10.1)
CHLORIDE SERPL-SCNC: 100 MMOL/L (ref 100–108)
CLARITY UR: CLEAR
CO2 SERPL-SCNC: 26 MMOL/L (ref 21–32)
COLOR UR: YELLOW
CREAT SERPL-MCNC: 0.74 MG/DL (ref 0.6–1.3)
EOSINOPHIL # BLD AUTO: 0.01 THOUSAND/ΜL (ref 0–0.61)
EOSINOPHIL NFR BLD AUTO: 0 % (ref 0–6)
ERYTHROCYTE [DISTWIDTH] IN BLOOD BY AUTOMATED COUNT: 11.8 % (ref 11.6–15.1)
EXT PREG TEST URINE: NORMAL
GFR SERPL CREATININE-BSD FRML MDRD: 111 ML/MIN/1.73SQ M
GLUCOSE SERPL-MCNC: 95 MG/DL (ref 65–140)
GLUCOSE UR STRIP-MCNC: ABNORMAL MG/DL
HCT VFR BLD AUTO: 44.8 % (ref 34.8–46.1)
HGB BLD-MCNC: 15 G/DL (ref 11.5–15.4)
HGB UR QL STRIP.AUTO: NEGATIVE
IMM GRANULOCYTES # BLD AUTO: 0.04 THOUSAND/UL (ref 0–0.2)
IMM GRANULOCYTES NFR BLD AUTO: 0 % (ref 0–2)
KETONES UR STRIP-MCNC: ABNORMAL MG/DL
LEUKOCYTE ESTERASE UR QL STRIP: ABNORMAL
LYMPHOCYTES # BLD AUTO: 1.8 THOUSANDS/ΜL (ref 0.6–4.47)
LYMPHOCYTES NFR BLD AUTO: 15 % (ref 14–44)
MCH RBC QN AUTO: 29.1 PG (ref 26.8–34.3)
MCHC RBC AUTO-ENTMCNC: 33.5 G/DL (ref 31.4–37.4)
MCV RBC AUTO: 87 FL (ref 82–98)
MONOCYTES # BLD AUTO: 0.78 THOUSAND/ΜL (ref 0.17–1.22)
MONOCYTES NFR BLD AUTO: 7 % (ref 4–12)
NEUTROPHILS # BLD AUTO: 9.16 THOUSANDS/ΜL (ref 1.85–7.62)
NEUTS SEG NFR BLD AUTO: 78 % (ref 43–75)
NITRITE UR QL STRIP: NEGATIVE
NON-SQ EPI CELLS URNS QL MICRO: ABNORMAL /HPF
NRBC BLD AUTO-RTO: 0 /100 WBCS
OTHER STN SPEC: ABNORMAL
PH UR STRIP.AUTO: 6.5 [PH] (ref 4.5–8)
PLATELET # BLD AUTO: 272 THOUSANDS/UL (ref 149–390)
PMV BLD AUTO: 10.1 FL (ref 8.9–12.7)
POTASSIUM SERPL-SCNC: 5 MMOL/L (ref 3.5–5.3)
PROT UR STRIP-MCNC: NEGATIVE MG/DL
RBC # BLD AUTO: 5.15 MILLION/UL (ref 3.81–5.12)
RBC #/AREA URNS AUTO: ABNORMAL /HPF
SODIUM SERPL-SCNC: 131 MMOL/L (ref 136–145)
SP GR UR STRIP.AUTO: 1.01 (ref 1–1.03)
UROBILINOGEN UR QL STRIP.AUTO: 0.2 E.U./DL
WBC # BLD AUTO: 11.82 THOUSAND/UL (ref 4.31–10.16)
WBC #/AREA URNS AUTO: ABNORMAL /HPF

## 2019-03-06 PROCEDURE — 99284 EMERGENCY DEPT VISIT MOD MDM: CPT

## 2019-03-06 PROCEDURE — 85025 COMPLETE CBC W/AUTO DIFF WBC: CPT | Performed by: EMERGENCY MEDICINE

## 2019-03-06 PROCEDURE — 87086 URINE CULTURE/COLONY COUNT: CPT

## 2019-03-06 PROCEDURE — 96361 HYDRATE IV INFUSION ADD-ON: CPT

## 2019-03-06 PROCEDURE — 80048 BASIC METABOLIC PNL TOTAL CA: CPT | Performed by: EMERGENCY MEDICINE

## 2019-03-06 PROCEDURE — 81003 URINALYSIS AUTO W/O SCOPE: CPT

## 2019-03-06 PROCEDURE — 81001 URINALYSIS AUTO W/SCOPE: CPT

## 2019-03-06 PROCEDURE — 81025 URINE PREGNANCY TEST: CPT | Performed by: EMERGENCY MEDICINE

## 2019-03-06 PROCEDURE — 96374 THER/PROPH/DIAG INJ IV PUSH: CPT

## 2019-03-06 PROCEDURE — 36415 COLL VENOUS BLD VENIPUNCTURE: CPT | Performed by: EMERGENCY MEDICINE

## 2019-03-06 PROCEDURE — 96375 TX/PRO/DX INJ NEW DRUG ADDON: CPT

## 2019-03-06 RX ORDER — METOCLOPRAMIDE 5 MG/1
5 TABLET ORAL 4 TIMES DAILY PRN
Qty: 15 TABLET | Refills: 0 | Status: SHIPPED | OUTPATIENT
Start: 2019-03-06 | End: 2019-06-25

## 2019-03-06 RX ORDER — METOCLOPRAMIDE HYDROCHLORIDE 5 MG/ML
10 INJECTION INTRAMUSCULAR; INTRAVENOUS ONCE
Status: COMPLETED | OUTPATIENT
Start: 2019-03-06 | End: 2019-03-06

## 2019-03-06 RX ORDER — CLONIDINE HYDROCHLORIDE 0.1 MG/1
0.1 TABLET ORAL ONCE
Status: COMPLETED | OUTPATIENT
Start: 2019-03-06 | End: 2019-03-06

## 2019-03-06 RX ORDER — KETOROLAC TROMETHAMINE 30 MG/ML
15 INJECTION, SOLUTION INTRAMUSCULAR; INTRAVENOUS ONCE
Status: COMPLETED | OUTPATIENT
Start: 2019-03-06 | End: 2019-03-06

## 2019-03-06 RX ORDER — DEXTROSE AND SODIUM CHLORIDE 5; .9 G/100ML; G/100ML
1000 INJECTION, SOLUTION INTRAVENOUS ONCE
Status: COMPLETED | OUTPATIENT
Start: 2019-03-06 | End: 2019-03-06

## 2019-03-06 RX ORDER — ONDANSETRON 2 MG/ML
4 INJECTION INTRAMUSCULAR; INTRAVENOUS ONCE
Status: COMPLETED | OUTPATIENT
Start: 2019-03-06 | End: 2019-03-06

## 2019-03-06 RX ADMIN — ONDANSETRON 4 MG: 2 INJECTION INTRAMUSCULAR; INTRAVENOUS at 10:05

## 2019-03-06 RX ADMIN — CLONIDINE HYDROCHLORIDE 0.1 MG: 0.1 TABLET ORAL at 11:35

## 2019-03-06 RX ADMIN — DEXTROSE AND SODIUM CHLORIDE 1000 ML: 5; .9 INJECTION, SOLUTION INTRAVENOUS at 12:00

## 2019-03-06 RX ADMIN — METOCLOPRAMIDE 10 MG: 5 INJECTION, SOLUTION INTRAMUSCULAR; INTRAVENOUS at 11:36

## 2019-03-06 RX ADMIN — KETOROLAC TROMETHAMINE 15 MG: 30 INJECTION, SOLUTION INTRAMUSCULAR; INTRAVENOUS at 11:36

## 2019-03-06 NOTE — ED PROVIDER NOTES
History  Chief Complaint   Patient presents with    Vomiting     seen yesterday for vomiting  states she vomited all night and this am, no relief from zofran     45-year-old female presents for vomiting and nausea  She reports for the past 2 and half days she has had repeated episodes of nausea and vomiting  Unable tolerate any liquids  Was seen in the emergency department yesterday evening for the same symptoms had unremarkable lab workup other than positive for THC and opioids  Was able to tolerate p o  Fluids after 1 L IV fluids and IV Zofran and was discharged home  She returns today stating she has been unable to keep anything down at home and has not urinated since leaving  She denies any abdominal pain and diarrhea constipation  No fevers chills or rigors  No sick contacts  No chest pain or shortness of breath  Vomiting has just been stomach content and mucus  She does report a long history of THC use but no history of cyclic vomiting syndrome or similar symptoms to today  She also reports using heroin for the 1st time 8 days ago snorting at 1 time and reportedly has never used it other than that single episode  She denies any other medical history no medication usage daily  LMP 2 weeks ago  On exam patient well appearing in no acute distress with normal vital signs  Mucous membranes are dry  Abdominal exam is benign  piloerection noted on forearms  Prior to Admission Medications   Prescriptions Last Dose Informant Patient Reported? Taking?    cephalexin (KEFLEX) 250 mg capsule 3/6/2019 at Unknown time  No Yes   Sig: Take 2 capsules (500 mg total) by mouth 4 (four) times a day for 5 days   naproxen (NAPROSYN) 375 mg tablet Not Taking at Unknown time  No No   Sig: Take 1 tablet (375 mg total) by mouth 2 (two) times a day with meals   Patient not taking: Reported on 3/5/2019   ondansetron (ZOFRAN-ODT) 4 mg disintegrating tablet 3/6/2019 at Unknown time  No Yes   Sig: Take 1 tablet (4 mg total) by mouth every 8 (eight) hours as needed for nausea or vomiting for up to 10 doses   ondansetron (ZOFRAN-ODT) 4 mg disintegrating tablet   No No   Sig: Take 1 tablet (4 mg total) by mouth every 8 (eight) hours      Facility-Administered Medications: None       History reviewed  No pertinent past medical history  History reviewed  No pertinent surgical history  History reviewed  No pertinent family history  I have reviewed and agree with the history as documented  Social History     Tobacco Use    Smoking status: Current Every Day Smoker     Types: Cigarettes    Smokeless tobacco: Never Used   Substance Use Topics    Alcohol use: No    Drug use: Yes     Types: Marijuana        Review of Systems   Constitutional: Negative for chills, fatigue and fever  HENT: Negative for congestion  Eyes: Negative for visual disturbance  Respiratory: Negative for chest tightness and shortness of breath  Cardiovascular: Negative for chest pain and palpitations  Gastrointestinal: Positive for nausea and vomiting  Negative for abdominal pain, constipation and diarrhea  Genitourinary: Positive for decreased urine volume  Negative for dysuria, flank pain, frequency, urgency, vaginal bleeding and vaginal discharge  Musculoskeletal: Negative for back pain and gait problem  Skin: Negative for rash  Neurological: Negative for dizziness, syncope, weakness, light-headedness, numbness and headaches         Physical Exam  ED Triage Vitals   Temperature Pulse Respirations Blood Pressure SpO2   03/06/19 0953 03/06/19 0953 03/06/19 0953 03/06/19 0953 03/06/19 0953   98 1 °F (36 7 °C) 59 18 112/58 98 %      Temp Source Heart Rate Source Patient Position - Orthostatic VS BP Location FiO2 (%)   03/06/19 0953 03/06/19 0953 03/06/19 0953 03/06/19 0953 --   Oral Monitor Lying Right arm       Pain Score       03/06/19 1136       8           Orthostatic Vital Signs  Vitals:    03/06/19 0953 03/06/19 1141   BP: 112/58 119/58   Pulse: 59 77   Patient Position - Orthostatic VS: Lying Lying       Physical Exam   Constitutional: She is oriented to person, place, and time  Vital signs are normal  She appears well-developed and well-nourished  She does not appear ill  No distress  HENT:   Head: Normocephalic and atraumatic  Head is without abrasion and without contusion  Right Ear: Tympanic membrane normal    Left Ear: Tympanic membrane normal    Nose: Nose normal    Mouth/Throat: Uvula is midline and oropharynx is clear and moist  Mucous membranes are dry  Eyes: Pupils are equal, round, and reactive to light  Conjunctivae and EOM are normal    Neck: Trachea normal, normal range of motion, full passive range of motion without pain and phonation normal  Neck supple  No JVD present  No spinous process tenderness and no muscular tenderness present  Carotid bruit is not present  Normal range of motion present  No thyromegaly present  Cardiovascular: Normal rate, regular rhythm and intact distal pulses  Exam reveals no friction rub  No murmur heard  Pulmonary/Chest: Effort normal and breath sounds normal  No accessory muscle usage or stridor  No tachypnea  No respiratory distress  She has no decreased breath sounds  She has no wheezes  She has no rhonchi  She has no rales  She exhibits no tenderness, no crepitus, no edema and no retraction  Abdominal: Soft  Normal appearance and bowel sounds are normal  She exhibits no distension  There is no tenderness  There is no rigidity, no rebound, no guarding and no CVA tenderness  Musculoskeletal: Normal range of motion  Lymphadenopathy:     She has no cervical adenopathy  Neurological: She is alert and oriented to person, place, and time  She has normal strength  No sensory deficit  GCS eye subscore is 4  GCS verbal subscore is 5  GCS motor subscore is 6  Skin: Skin is warm, dry and intact  No rash noted  She is not diaphoretic     Psychiatric: She has a normal mood and affect  Nursing note and vitals reviewed  ED Medications  Medications   ondansetron (ZOFRAN) injection 4 mg (4 mg Intravenous Given 3/6/19 1005)   ketorolac (TORADOL) injection 15 mg (15 mg Intravenous Given 3/6/19 1136)   metoclopramide (REGLAN) injection 10 mg (10 mg Intravenous Given 3/6/19 1136)   cloNIDine (CATAPRES) tablet 0 1 mg (0 1 mg Oral Given 3/6/19 1135)   dextrose 5 % and sodium chloride 0 9 % infusion (0 mL Intravenous Stopped 3/6/19 1244)       Diagnostic Studies  Results Reviewed     Procedure Component Value Units Date/Time    Urine Microscopic [[id number]] Collected:  03/06/19 1241    Lab Status:   In process Specimen:  Urine, Clean Catch Updated:  03/06/19 1244    POCT pregnancy, urine [[id number]]  (Normal) Resulted:  03/06/19 1239    Lab Status:  Final result Updated:  03/06/19 1239     EXT PREG TEST UR (Ref: Negative) neg    ED Urine Macroscopic [[id number]]  (Abnormal) Collected:  03/06/19 1241    Lab Status:  Final result Specimen:  Urine Updated:  03/06/19 1237     Color, UA Yellow     Clarity, UA Clear     pH, UA 6 5     Leukocytes, UA Small     Nitrite, UA Negative     Protein, UA Negative mg/dl      Glucose,  (1/2%) mg/dl      Ketones, UA 15 (1+) mg/dl      Urobilinogen, UA 0 2 E U /dl      Bilirubin, UA Negative     Blood, UA Negative     Specific Gravity, UA 1 015    Narrative:       CLINITEK RESULT    Basic metabolic panel [[id number]]  (Abnormal) Collected:  03/06/19 1006    Lab Status:  Final result Specimen:  Blood from Hand, Left Updated:  03/06/19 1040     Sodium 131 mmol/L      Potassium 5 0 mmol/L      Chloride 100 mmol/L      CO2 26 mmol/L      ANION GAP 5 mmol/L      BUN 17 mg/dL      Creatinine 0 74 mg/dL      Glucose 95 mg/dL      Calcium 8 8 mg/dL      eGFR 111 ml/min/1 73sq m     Narrative:       National Kidney Disease Education Program recommendations are as follows:  GFR calculation is accurate only with a steady state creatinine  Chronic Kidney disease less than 60 ml/min/1 73 sq  meters  Kidney failure less than 15 ml/min/1 73 sq  meters  CBC and differential [[id number]]  (Abnormal) Collected:  03/06/19 1006    Lab Status:  Final result Specimen:  Blood from Hand, Left Updated:  03/06/19 1018     WBC 11 82 Thousand/uL      RBC 5 15 Million/uL      Hemoglobin 15 0 g/dL      Hematocrit 44 8 %      MCV 87 fL      MCH 29 1 pg      MCHC 33 5 g/dL      RDW 11 8 %      MPV 10 1 fL      Platelets 820 Thousands/uL      nRBC 0 /100 WBCs      Neutrophils Relative 78 %      Immat GRANS % 0 %      Lymphocytes Relative 15 %      Monocytes Relative 7 %      Eosinophils Relative 0 %      Basophils Relative 0 %      Neutrophils Absolute 9 16 Thousands/µL      Immature Grans Absolute 0 04 Thousand/uL      Lymphocytes Absolute 1 80 Thousands/µL      Monocytes Absolute 0 78 Thousand/µL      Eosinophils Absolute 0 01 Thousand/µL      Basophils Absolute 0 03 Thousands/µL     POCT urinalysis dipstick [[id number]]     Lab Status:  No result                  No orders to display         Procedures  Procedures      Phone Consults  ED Phone Contact    ED Course  ED Course as of Mar 06 1251   Wed Mar 06, 2019   1032 CBC and differential(!)   1225   Patient tolerating p o  Fluids  Feels significantly improved after antiemetics and IV fluids  1237 Ketones, UA(!): 15 (1+)   1239 PREGNANCY TEST URINE: neg                               MDM    Disposition  Final diagnoses:   Nausea & vomiting   Dehydration     Time reflects when diagnosis was documented in both MDM as applicable and the Disposition within this note     Time User Action Codes Description Comment    3/6/2019 12:29 PM Claudia GIBBS Add [R11 2] Nausea & vomiting     3/6/2019 12:29 PM Balbir Velazquez Add [E86 0] Dehydration       ED Disposition     ED Disposition Condition Date/Time Comment    Discharge Stable Wed Mar 6, 3734 73:04 PM Noah Chapman discharge to home/self care              Follow-up Information     Follow up With Specialties Details Why Contact Info Additional Information    Hermelinda Aquino MD Family Medicine Go in 1 day For re-check [address]  [address]  [address]  [phone]       [facility] [address] Emergency Department Emergency Medicine Go to  As needed, If symptoms worsen [address]  [phone]  ED, [address] [place] [address], [phone]          Discharge Medication List as of 3/6/2019 12:41 PM      START taking these medications    Details   metoclopramide (REGLAN) 5 mg tablet Take 1 tablet (5 mg total) by mouth 4 (four) times a day as needed (nausea and vomiting), Starting Wed 3/6/2019, Print         CONTINUE these medications which have NOT CHANGED    Details   cephalexin (KEFLEX) 250 mg capsule Take 2 capsules (500 mg total) by mouth 4 (four) times a day for 5 days, Starting Tue 3/5/2019, Until Sun 3/10/2019, Print      !! ondansetron (ZOFRAN-ODT) 4 mg disintegrating tablet Take 1 tablet (4 mg total) by mouth every 8 (eight) hours as needed for nausea or vomiting for up to 10 doses, Starting Tue 3/5/2019, Print      naproxen (NAPROSYN) 375 mg tablet Take 1 tablet (375 mg total) by mouth 2 (two) times a day with meals, Starting Sat 7/28/2018, Print      !! ondansetron (ZOFRAN-ODT) 4 mg disintegrating tablet Take 1 tablet (4 mg total) by mouth every 8 (eight) hours, Starting Tue 3/5/2019, Print       !! - Potential duplicate medications found  Please discuss with provider  No discharge procedures on file  ED Provider  Attending physically available and evaluated Ezekiel Ellis I managed the patient along with the ED Attending      Electronically Signed by         Gin Jordan DO  03/06/19 4833

## 2019-03-06 NOTE — DISCHARGE INSTRUCTIONS
Continue to increase your fluid intake over the next 24-48 hours  Continue with a bland diet to help control your symptoms of nausea vomiting  Follow up with the primary care doctor in 24-48 hours for re-evaluation  Continue with medication provided today for any continued nausea and to assist you tolerating p o  Fluids    Return with any new or worsening symptoms including localized abdominal pain, fever, persistent vomiting or any other concerning symptoms

## 2019-03-07 LAB — BACTERIA UR CULT: NORMAL

## 2019-06-25 ENCOUNTER — APPOINTMENT (EMERGENCY)
Dept: RADIOLOGY | Facility: HOSPITAL | Age: 29
End: 2019-06-25
Payer: COMMERCIAL

## 2019-06-25 ENCOUNTER — HOSPITAL ENCOUNTER (OUTPATIENT)
Facility: HOSPITAL | Age: 29
Setting detail: OBSERVATION
Discharge: HOME | End: 2019-06-26
Attending: EMERGENCY MEDICINE | Admitting: INTERNAL MEDICINE
Payer: COMMERCIAL

## 2019-06-25 DIAGNOSIS — J98.01 BRONCHOSPASM: ICD-10-CM

## 2019-06-25 DIAGNOSIS — R09.02 HYPOXIA: Primary | ICD-10-CM

## 2019-06-25 LAB
ALBUMIN SERPL BCP-MCNC: 3.6 G/DL (ref 3.5–5)
ALP SERPL-CCNC: 78 U/L (ref 46–116)
ALT SERPL W P-5'-P-CCNC: 19 U/L (ref 12–78)
ANION GAP SERPL CALCULATED.3IONS-SCNC: 5 MMOL/L (ref 4–13)
APTT PPP: 32 SECONDS (ref 23–37)
AST SERPL W P-5'-P-CCNC: 14 U/L (ref 5–45)
BASOPHILS # BLD AUTO: 0.05 THOUSANDS/ΜL (ref 0–0.1)
BASOPHILS NFR BLD AUTO: 0 % (ref 0–1)
BILIRUB SERPL-MCNC: 0.41 MG/DL (ref 0.2–1)
BUN SERPL-MCNC: 9 MG/DL (ref 5–25)
CALCIUM SERPL-MCNC: 8.9 MG/DL (ref 8.3–10.1)
CHLORIDE SERPL-SCNC: 108 MMOL/L (ref 100–108)
CO2 SERPL-SCNC: 30 MMOL/L (ref 21–32)
CREAT SERPL-MCNC: 0.76 MG/DL (ref 0.6–1.3)
EOSINOPHIL # BLD AUTO: 0.85 THOUSAND/ΜL (ref 0–0.61)
EOSINOPHIL NFR BLD AUTO: 7 % (ref 0–6)
ERYTHROCYTE [DISTWIDTH] IN BLOOD BY AUTOMATED COUNT: 12.1 % (ref 11.6–15.1)
GFR SERPL CREATININE-BSD FRML MDRD: 107 ML/MIN/1.73SQ M
GLUCOSE SERPL-MCNC: 101 MG/DL (ref 65–140)
HCT VFR BLD AUTO: 43.4 % (ref 34.8–46.1)
HGB BLD-MCNC: 14.1 G/DL (ref 11.5–15.4)
IMM GRANULOCYTES # BLD AUTO: 0.02 THOUSAND/UL (ref 0–0.2)
IMM GRANULOCYTES NFR BLD AUTO: 0 % (ref 0–2)
INR PPP: 1.06 (ref 0.84–1.19)
LACTATE SERPL-SCNC: 2 MMOL/L (ref 0.5–2)
LYMPHOCYTES # BLD AUTO: 3.01 THOUSANDS/ΜL (ref 0.6–4.47)
LYMPHOCYTES NFR BLD AUTO: 25 % (ref 14–44)
MCH RBC QN AUTO: 29.3 PG (ref 26.8–34.3)
MCHC RBC AUTO-ENTMCNC: 32.5 G/DL (ref 31.4–37.4)
MCV RBC AUTO: 90 FL (ref 82–98)
MONOCYTES # BLD AUTO: 0.71 THOUSAND/ΜL (ref 0.17–1.22)
MONOCYTES NFR BLD AUTO: 6 % (ref 4–12)
NEUTROPHILS # BLD AUTO: 7.38 THOUSANDS/ΜL (ref 1.85–7.62)
NEUTS SEG NFR BLD AUTO: 62 % (ref 43–75)
NRBC BLD AUTO-RTO: 0 /100 WBCS
PLATELET # BLD AUTO: 265 THOUSANDS/UL (ref 149–390)
PMV BLD AUTO: 10.8 FL (ref 8.9–12.7)
POTASSIUM SERPL-SCNC: 3.5 MMOL/L (ref 3.5–5.3)
PROCALCITONIN SERPL-MCNC: <0.05 NG/ML
PROT SERPL-MCNC: 7.1 G/DL (ref 6.4–8.2)
PROTHROMBIN TIME: 13.4 SECONDS (ref 11.6–14.5)
RBC # BLD AUTO: 4.82 MILLION/UL (ref 3.81–5.12)
SODIUM SERPL-SCNC: 143 MMOL/L (ref 136–145)
WBC # BLD AUTO: 12.02 THOUSAND/UL (ref 4.31–10.16)

## 2019-06-25 PROCEDURE — 94760 N-INVAS EAR/PLS OXIMETRY 1: CPT

## 2019-06-25 PROCEDURE — 83605 ASSAY OF LACTIC ACID: CPT | Performed by: EMERGENCY MEDICINE

## 2019-06-25 PROCEDURE — 94644 CONT INHLJ TX 1ST HOUR: CPT

## 2019-06-25 PROCEDURE — 85730 THROMBOPLASTIN TIME PARTIAL: CPT | Performed by: EMERGENCY MEDICINE

## 2019-06-25 PROCEDURE — 80053 COMPREHEN METABOLIC PANEL: CPT | Performed by: EMERGENCY MEDICINE

## 2019-06-25 PROCEDURE — 85610 PROTHROMBIN TIME: CPT | Performed by: EMERGENCY MEDICINE

## 2019-06-25 PROCEDURE — 85025 COMPLETE CBC W/AUTO DIFF WBC: CPT | Performed by: EMERGENCY MEDICINE

## 2019-06-25 PROCEDURE — 99285 EMERGENCY DEPT VISIT HI MDM: CPT | Performed by: EMERGENCY MEDICINE

## 2019-06-25 PROCEDURE — 87040 BLOOD CULTURE FOR BACTERIA: CPT | Performed by: EMERGENCY MEDICINE

## 2019-06-25 PROCEDURE — 96374 THER/PROPH/DIAG INJ IV PUSH: CPT

## 2019-06-25 PROCEDURE — 71046 X-RAY EXAM CHEST 2 VIEWS: CPT

## 2019-06-25 PROCEDURE — 84145 PROCALCITONIN (PCT): CPT | Performed by: EMERGENCY MEDICINE

## 2019-06-25 PROCEDURE — 99285 EMERGENCY DEPT VISIT HI MDM: CPT

## 2019-06-25 PROCEDURE — 36415 COLL VENOUS BLD VENIPUNCTURE: CPT | Performed by: EMERGENCY MEDICINE

## 2019-06-25 RX ORDER — METHYLPREDNISOLONE SODIUM SUCCINATE 125 MG/2ML
125 INJECTION, POWDER, LYOPHILIZED, FOR SOLUTION INTRAMUSCULAR; INTRAVENOUS ONCE
Status: COMPLETED | OUTPATIENT
Start: 2019-06-25 | End: 2019-06-25

## 2019-06-25 RX ORDER — SODIUM CHLORIDE FOR INHALATION 0.9 %
12 VIAL, NEBULIZER (ML) INHALATION ONCE
Status: COMPLETED | OUTPATIENT
Start: 2019-06-25 | End: 2019-06-25

## 2019-06-25 RX ORDER — ALBUTEROL SULFATE 2.5 MG/3ML
1 SOLUTION RESPIRATORY (INHALATION) ONCE
Status: COMPLETED | OUTPATIENT
Start: 2019-06-25 | End: 2019-06-25

## 2019-06-25 RX ORDER — IPRATROPIUM BROMIDE AND ALBUTEROL SULFATE .5; 3 MG/3ML; MG/3ML
1 SOLUTION RESPIRATORY (INHALATION) ONCE
Status: COMPLETED | OUTPATIENT
Start: 2019-06-25 | End: 2019-06-25

## 2019-06-25 RX ADMIN — ALBUTEROL SULFATE 10 MG: 2.5 SOLUTION RESPIRATORY (INHALATION) at 21:35

## 2019-06-25 RX ADMIN — IPRATROPIUM BROMIDE 1 MG: 0.5 SOLUTION RESPIRATORY (INHALATION) at 21:35

## 2019-06-25 RX ADMIN — ISODIUM CHLORIDE 12 ML: 0.03 SOLUTION RESPIRATORY (INHALATION) at 21:35

## 2019-06-25 RX ADMIN — METHYLPREDNISOLONE SODIUM SUCCINATE 125 MG: 125 INJECTION, POWDER, FOR SOLUTION INTRAMUSCULAR; INTRAVENOUS at 21:50

## 2019-06-26 VITALS
BODY MASS INDEX: 19.97 KG/M2 | OXYGEN SATURATION: 92 % | HEIGHT: 67 IN | WEIGHT: 127.2 LBS | DIASTOLIC BLOOD PRESSURE: 55 MMHG | RESPIRATION RATE: 16 BRPM | SYSTOLIC BLOOD PRESSURE: 104 MMHG | HEART RATE: 101 BPM | TEMPERATURE: 97.8 F

## 2019-06-26 PROBLEM — J68.0 ACUTE CHEMICAL PNEUMONITIS (HCC): Status: ACTIVE | Noted: 2019-06-26

## 2019-06-26 PROBLEM — K29.70 GASTRITIS: Status: RESOLVED | Noted: 2018-04-09 | Resolved: 2019-06-26

## 2019-06-26 PROBLEM — D72.18 EOSINOPHILIC BRONCHITIS: Status: ACTIVE | Noted: 2019-06-26

## 2019-06-26 PROBLEM — F17.210 CIGARETTE NICOTINE DEPENDENCE WITHOUT COMPLICATION: Chronic | Status: ACTIVE | Noted: 2019-06-26

## 2019-06-26 PROBLEM — F11.10 HEROIN ABUSE (HCC): Status: ACTIVE | Noted: 2019-06-26

## 2019-06-26 PROBLEM — J96.01 ACUTE RESPIRATORY FAILURE WITH HYPOXIA (HCC): Status: ACTIVE | Noted: 2019-06-26

## 2019-06-26 PROBLEM — F11.10 HEROIN ABUSE (HCC): Chronic | Status: ACTIVE | Noted: 2019-06-26

## 2019-06-26 PROBLEM — J40 EOSINOPHILIC BRONCHITIS: Status: ACTIVE | Noted: 2019-06-26

## 2019-06-26 LAB
BASOPHILS # BLD MANUAL: 0 THOUSAND/UL (ref 0–0.1)
BASOPHILS NFR MAR MANUAL: 0 % (ref 0–1)
EOSINOPHIL # BLD MANUAL: 0 THOUSAND/UL (ref 0–0.4)
EOSINOPHIL NFR BLD MANUAL: 0 % (ref 0–6)
ERYTHROCYTE [DISTWIDTH] IN BLOOD BY AUTOMATED COUNT: 11.9 % (ref 11.6–15.1)
HCT VFR BLD AUTO: 41.4 % (ref 34.8–46.1)
HGB BLD-MCNC: 13.4 G/DL (ref 11.5–15.4)
HIV 1+2 AB+HIV1 P24 AG SERPL QL IA: NORMAL
LYMPHOCYTES # BLD AUTO: 0.26 THOUSAND/UL (ref 0.6–4.47)
LYMPHOCYTES # BLD AUTO: 4 % (ref 14–44)
MAGNESIUM SERPL-MCNC: 1.7 MG/DL (ref 1.6–2.6)
MCH RBC QN AUTO: 28.9 PG (ref 26.8–34.3)
MCHC RBC AUTO-ENTMCNC: 32.4 G/DL (ref 31.4–37.4)
MCV RBC AUTO: 89 FL (ref 82–98)
MONOCYTES # BLD AUTO: 0 THOUSAND/UL (ref 0–1.22)
MONOCYTES NFR BLD: 0 % (ref 4–12)
NEUTROPHILS # BLD MANUAL: 6.27 THOUSAND/UL (ref 1.85–7.62)
NEUTS SEG NFR BLD AUTO: 96 % (ref 43–75)
NRBC BLD AUTO-RTO: 0 /100 WBCS
PLATELET # BLD AUTO: 234 THOUSANDS/UL (ref 149–390)
PLATELET BLD QL SMEAR: ADEQUATE
PMV BLD AUTO: 10.4 FL (ref 8.9–12.7)
PROCALCITONIN SERPL-MCNC: <0.05 NG/ML
RBC # BLD AUTO: 4.64 MILLION/UL (ref 3.81–5.12)
RBC MORPH BLD: NORMAL
WBC # BLD AUTO: 6.53 THOUSAND/UL (ref 4.31–10.16)

## 2019-06-26 PROCEDURE — 94760 N-INVAS EAR/PLS OXIMETRY 1: CPT

## 2019-06-26 PROCEDURE — 99220 PR INITIAL OBSERVATION CARE/DAY 70 MINUTES: CPT | Performed by: INTERNAL MEDICINE

## 2019-06-26 PROCEDURE — 83735 ASSAY OF MAGNESIUM: CPT | Performed by: INTERNAL MEDICINE

## 2019-06-26 PROCEDURE — 87389 HIV-1 AG W/HIV-1&-2 AB AG IA: CPT | Performed by: INTERNAL MEDICINE

## 2019-06-26 PROCEDURE — 84145 PROCALCITONIN (PCT): CPT | Performed by: INTERNAL MEDICINE

## 2019-06-26 PROCEDURE — 36415 COLL VENOUS BLD VENIPUNCTURE: CPT | Performed by: INTERNAL MEDICINE

## 2019-06-26 PROCEDURE — 94150 VITAL CAPACITY TEST: CPT

## 2019-06-26 PROCEDURE — NC001 PR NO CHARGE: Performed by: INTERNAL MEDICINE

## 2019-06-26 PROCEDURE — 85027 COMPLETE CBC AUTOMATED: CPT | Performed by: INTERNAL MEDICINE

## 2019-06-26 PROCEDURE — 94664 DEMO&/EVAL PT USE INHALER: CPT

## 2019-06-26 PROCEDURE — 94640 AIRWAY INHALATION TREATMENT: CPT

## 2019-06-26 PROCEDURE — 85007 BL SMEAR W/DIFF WBC COUNT: CPT | Performed by: INTERNAL MEDICINE

## 2019-06-26 RX ORDER — LEVALBUTEROL 1.25 MG/.5ML
1.25 SOLUTION, CONCENTRATE RESPIRATORY (INHALATION)
Status: DISCONTINUED | OUTPATIENT
Start: 2019-06-26 | End: 2019-06-26 | Stop reason: HOSPADM

## 2019-06-26 RX ORDER — FLUTICASONE PROPIONATE 44 UG/1
2 AEROSOL, METERED RESPIRATORY (INHALATION) 2 TIMES DAILY
Qty: 1 INHALER | Refills: 0 | Status: ON HOLD | OUTPATIENT
Start: 2019-06-26 | End: 2021-02-17

## 2019-06-26 RX ORDER — SODIUM CHLORIDE, SODIUM LACTATE, POTASSIUM CHLORIDE, CALCIUM CHLORIDE 600; 310; 30; 20 MG/100ML; MG/100ML; MG/100ML; MG/100ML
125 INJECTION, SOLUTION INTRAVENOUS CONTINUOUS
Status: DISCONTINUED | OUTPATIENT
Start: 2019-06-26 | End: 2019-06-26 | Stop reason: HOSPADM

## 2019-06-26 RX ORDER — ALBUTEROL SULFATE 2.5 MG/3ML
2.5 SOLUTION RESPIRATORY (INHALATION) EVERY 4 HOURS PRN
Status: DISCONTINUED | OUTPATIENT
Start: 2019-06-26 | End: 2019-06-26 | Stop reason: HOSPADM

## 2019-06-26 RX ORDER — ACETAMINOPHEN 325 MG/1
650 TABLET ORAL EVERY 6 HOURS PRN
Status: DISCONTINUED | OUTPATIENT
Start: 2019-06-26 | End: 2019-06-26 | Stop reason: HOSPADM

## 2019-06-26 RX ORDER — CETIRIZINE HYDROCHLORIDE 5 MG/1
5 TABLET ORAL DAILY
Qty: 30 TABLET | Refills: 0 | Status: ON HOLD | OUTPATIENT
Start: 2019-06-26 | End: 2021-02-17

## 2019-06-26 RX ORDER — ALBUTEROL SULFATE 90 UG/1
2 AEROSOL, METERED RESPIRATORY (INHALATION) EVERY 6 HOURS PRN
Qty: 8.5 G | Refills: 0 | Status: SHIPPED | OUTPATIENT
Start: 2019-06-26 | End: 2019-06-26 | Stop reason: SDUPTHER

## 2019-06-26 RX ORDER — PREDNISONE 20 MG/1
40 TABLET ORAL DAILY
Qty: 4 TABLET | Refills: 0 | Status: SHIPPED | OUTPATIENT
Start: 2019-06-27 | End: 2019-06-29

## 2019-06-26 RX ORDER — ALBUTEROL SULFATE 90 UG/1
2 AEROSOL, METERED RESPIRATORY (INHALATION) EVERY 6 HOURS PRN
Qty: 8.5 G | Refills: 0 | OUTPATIENT
Start: 2019-06-26 | End: 2020-02-28

## 2019-06-26 RX ORDER — PREDNISONE 20 MG/1
40 TABLET ORAL DAILY
Qty: 4 TABLET | Refills: 0 | Status: SHIPPED | OUTPATIENT
Start: 2019-06-27 | End: 2019-06-26

## 2019-06-26 RX ORDER — CETIRIZINE HYDROCHLORIDE 5 MG/1
5 TABLET ORAL DAILY
Qty: 30 TABLET | Refills: 2 | Status: SHIPPED | OUTPATIENT
Start: 2019-06-26 | End: 2019-06-26 | Stop reason: SDUPTHER

## 2019-06-26 RX ORDER — PREDNISONE 20 MG/1
40 TABLET ORAL DAILY
Status: DISCONTINUED | OUTPATIENT
Start: 2019-06-26 | End: 2019-06-26 | Stop reason: HOSPADM

## 2019-06-26 RX ORDER — ONDANSETRON 2 MG/ML
4 INJECTION INTRAMUSCULAR; INTRAVENOUS EVERY 6 HOURS PRN
Status: DISCONTINUED | OUTPATIENT
Start: 2019-06-26 | End: 2019-06-26 | Stop reason: HOSPADM

## 2019-06-26 RX ORDER — SODIUM CHLORIDE FOR INHALATION 0.9 %
3 VIAL, NEBULIZER (ML) INHALATION
Status: DISCONTINUED | OUTPATIENT
Start: 2019-06-26 | End: 2019-06-26 | Stop reason: HOSPADM

## 2019-06-26 RX ORDER — FLUTICASONE PROPIONATE 44 UG/1
2 AEROSOL, METERED RESPIRATORY (INHALATION) 2 TIMES DAILY
Qty: 1 INHALER | Refills: 2 | Status: SHIPPED | OUTPATIENT
Start: 2019-06-26 | End: 2019-06-26 | Stop reason: SDUPTHER

## 2019-06-26 RX ADMIN — ISODIUM CHLORIDE 3 ML: 0.03 SOLUTION RESPIRATORY (INHALATION) at 09:21

## 2019-06-26 RX ADMIN — SODIUM CHLORIDE, SODIUM LACTATE, POTASSIUM CHLORIDE, AND CALCIUM CHLORIDE 125 ML/HR: .6; .31; .03; .02 INJECTION, SOLUTION INTRAVENOUS at 10:56

## 2019-06-26 RX ADMIN — SODIUM CHLORIDE, SODIUM LACTATE, POTASSIUM CHLORIDE, AND CALCIUM CHLORIDE 125 ML/HR: .6; .31; .03; .02 INJECTION, SOLUTION INTRAVENOUS at 01:00

## 2019-06-26 RX ADMIN — LEVALBUTEROL 1.25 MG: 1.25 SOLUTION, CONCENTRATE RESPIRATORY (INHALATION) at 09:21

## 2019-06-26 RX ADMIN — PREDNISONE 40 MG: 20 TABLET ORAL at 08:43

## 2019-06-26 NOTE — H&P
INTERNAL MEDICINE HISTORY AND PHYSICAL  ED 02 SOD Team A    NAME: Bk Marshall  AGE: 30 y o  SEX: female  : 1990   MRN: [id number]  ENCOUNTER: [id number]    DATE: 2019  TIME: 12:25 AM    Primary Care Physician: Henrik Mulilgan MD  Admitting Provider: Soham Marmolejo MD    Chief complaint:  Dyspnea    History of Present Illness     Bk Marshall is a 30 y o  female with history of snorting heroin otherwise no other medical problems presents for evaluation of dyspnea and cough of recent onset several days ago  Symptoms started with a productive cough with green sputum and sensation of chest congestion  She was evaluated at [facility] several days ago and per documentation was given script for prednisone and Zithromax however she confirms to me that she was not taking either of these  At that time admission was recommended however patient shows leave against medical advice  Now symptoms have worsened and she is more dyspneic and therefore called EMS  Patient was with an O2 sat of 88% on room air per EMS  She received  nebulizer treatments in the emergency department and is now satting around 91-92% on room air and does feel improved  She denies any history of fever or chills with this  She does report pleuritic chest discomfort that is located substernally  Patient takes no medications regularly, only thing tried for early relief was cough suppressants  She has no history of asthma or other chronic lung disease  She does admit to snorting heroin with last use this evening prior to coming in  She smokes about 5 cigarettes daily since age 7, drinks occasionally  She denies fever or chills  She denies decreased appetite, denies nausea or vomiting, denies abdominal pain  She has no sore throat or significant nasal congestion      Review of Systems     Constitutional: No fever or chills  Eyes: No changes in vision   ENT: No rhinorrhea  Respiratory:  See HPI  Cardiovascular: no chest pain, no edema  Abdomen: no vomiting, no abdominal pain  MSK: no trauma, no deformity   : no urinary complaints  Neuro: no altered mentation  Skin: no new skin changes   A complete 12-system review was performed and is negative except as otherwise mentioned in the HPI  Past Medical History     Past Medical History:   Diagnosis Date    Heroin abuse [facility])        Past Surgical History   History reviewed  No pertinent surgical history  Social History     Social History     Substance and Sexual Activity   Alcohol Use No     Social History     Substance and Sexual Activity   Drug Use Yes    Types: Marijuana     Social History     Tobacco Use   Smoking Status Current Every Day Smoker    Types: Cigarettes   Smokeless Tobacco Never Used       Family History     Family History   Problem Relation Age of Onset    Throat cancer Maternal Grandfather     Asthma Family        Medications Prior to Admission     NONE          Allergies   No Known Allergies    Objective     Vitals:    06/25/19 2043 06/25/19 2052 06/25/19 2135 06/26/19 0015   BP: 124/54   122/56   BP Location:    Right arm   Pulse: (!) 115   101   Resp: 20   18   Temp:  97 9 °F (36 6 °C)     SpO2: 91%  92% 92%   Weight: 52 2 kg (115 lb 1 3 oz)        Body mass index is 18 57 kg/m²  No intake or output data in the 24 hours ending 06/26/19 0025  Invasive Devices     Peripheral Intravenous Line            Peripheral IV 06/25/19 Left Antecubital less than 1 day                Physical Exam  GENERAL: Thin appearing young female with multiple tattoos over chest and extremities  No acute distress   HEENT: Normocephalic and atraumatic  Normal conjunctivae  No scleral icterus  Oropharynx without injection  Grade 2 tonsillar hypertrophy  NECK: Neck supple  Trachea midline  No JVD  CARDIOVASCULAR: S1 and S2 are present  Tachycardic with regular rhythm  No murmurs, rubs, or gallops     RESPIRATORY: Bilateral rhonchi and coarse crackles throughout all lung fields, diminished air entry  No acute respiratory distress  Symmetrical chest rise  On room air  BREAST: Deferred  ABDOMINAL: Non-distended  EXTREMITIES:  No cyanosis, clubbing, or edema  /GYN: Deferred  RECTAL: Deferred  BACK:  No gross deformities  NEUROLOGIC: Patient is alert and oriented to person, place, and time  No sensory or motor deficits  Speech fluent  No focal motor deficit  SKIN: Skin is warm and dry  No petechiae or purpura  No rash over visible portions of skin  PSYCHIATRIC: Normal mood and affect     Lab Results: I have personally reviewed pertinent reports  CBC:   Results from last 7 days   Lab Units 06/25/19  2112   WBC Thousand/uL 12 02*   RBC Million/uL 4 82   HEMOGLOBIN g/dL 14 1   HEMATOCRIT % 43 4   MCV fL 90   MCH pg 29 3   MCHC g/dL 32 5   RDW % 12 1   MPV fL 10 8   PLATELETS Thousands/uL 265   NRBC AUTO /100 WBCs 0   NEUTROS PCT % 62   LYMPHS PCT % 25   MONOS PCT % 6   EOS PCT % 7*   BASOS PCT % 0   NEUTROS ABS Thousands/µL 7 38   LYMPHS ABS Thousands/µL 3 01   MONOS ABS Thousand/µL 0 71   EOS ABS Thousand/µL 0 85*   , Chemistry Profile:   Results from last 7 days   Lab Units 06/25/19  2112   POTASSIUM mmol/L 3 5   CHLORIDE mmol/L 108   CO2 mmol/L 30   BUN mg/dL 9   CREATININE mg/dL 0 76   CALCIUM mg/dL 8 9   AST U/L 14   ALT U/L 19   ALK PHOS U/L 78   EGFR ml/min/1 73sq m 107       Imaging: I have personally reviewed pertinent films in PACS  Xr Chest Pa & Lateral    Result Date: 6/25/2019  Narrative: CHEST INDICATION:   hypoxia  COMPARISON:  None EXAM PERFORMED/VIEWS:  XR CHEST PA & LATERAL FINDINGS: Cardiomediastinal silhouette appears unremarkable  Oblique right perihilar opacity, consistent with subsegmental atelectasis  Lungs and pleural spaces otherwise clear  No pneumothorax  Osseous structures appear within normal limits for patient age  Impression: Probable right perihilar subsegmental atelectasis   Otherwise, no acute abnormality in the chest  Workstation performed: [id number]       EKG, Pathology, and Other Studies: I have personally reviewed pertinent reports  Medications given in Emergency Department     Medication Administration - last 24 hours from 06/25/2019 0025 to 06/26/2019 0025       Date/Time Order Dose Route Action Action by     06/25/2019 2334 albuterol (FOR EMS ONLY) (2 5 mg/3 mL) 0 083 % inhalation solution 2 5 mg 0 mg Does not apply Given to EMS Joaquin Aguiar RN     06/25/2019 2334 ipratropium-albuterol (FOR EMS ONLY) (DUO-NEB) 0 5-2 5 mg/3 mL inhalation solution 3 mL 0 mL Does not apply Given to EMS Joaquin Aguiar RN     06/25/2019 2135 albuterol inhalation solution 10 mg 10 mg Nebulization Given [facility]     06/25/2019 2135 ipratropium (ATROVENT) 0 02 % inhalation solution 1 mg 1 mg Nebulization Given [facility], [place]     06/25/2019 2135 sodium chloride 0 9 % inhalation solution 12 mL 12 mL Nebulization Given [facility], [place]     06/25/2019 2150 methylPREDNISolone sodium succinate (Solu-MEDROL) injection 125 mg 125 mg Intravenous Given Alanna Owen RN          Assessment and Plan   Principal Problem:    Acute respiratory failure with hypoxia ([facility] )  Active Problems:    Acute chemical pneumonitis (HCC)    Eosinophilic bronchitis    Heroin abuse ([facility] )    Cigarette nicotine dependence without complication      * Acute respiratory failure with hypoxia (HCC)  Assessment & Plan  Mild acute respiratory failure with hypoxia based on O2 saturation near 88% on room air  Currently satting 91% on room air  Respiratory protocol and O2 via nasal cannula to keep oxygen greater than 88%  Severity of symptoms as mild and secondary to the below conditions  Eosinophilic bronchitis  Assessment & Plan  Significantly improved after nebulizer treatments in the emergency department  O2 saturation is still borderline low in the 90s on room air  Steroid treatment as above    Continue Xopenex and respiratory protocol t  i  d  Evy Sole Unlikely bacterial pneumonia as there is no neutrophilia and this would typically present with decreased eosinophilic count due to suppression from endogenous glucocorticoids  Monitor off antibiotics  Acute chemical pneumonitis [facility])  Assessment & Plan  Secondary to snorting heroin recently  High probability for aspiration during insufflation of powder with unknown chemicals  Respiratory protocol as above  Peripheral eosinophilia noted consistent with drug injury versus undiagnosed asthma  Supportive care and monitoring  Will give 5 day course of prednisone at 40 mg p o  Daily  Heroin abuse ([facility] )  Assessment & Plan  Reports snorting heroin with last use earlier this evening  Case management consulted, host referral if agreeable  Cigarette nicotine dependence without complication  Assessment & Plan  Smokes approximately 5 cigarettes daily since age 7  Nicotine patch low dose p r n  Smoking cessation counseling  Code Status: Level 1 - Full Code  VTE Pharmacologic Prophylaxis: Reason for no pharmacologic prophylaxis Low risk-ambulation   VTE Mechanical Prophylaxis: reason for no mechanical VTE prophylaxis Low risk-ambulation  Admission Status: INPATIENT     Admission Time  I spent 1 hour admitting the patient  This involved direct patient contact where I performed a full history and physical, reviewing previous records, and reviewing laboratory and other diagnostic studies  ROSS Simmons       Internal Medicine   PGY-2  6/26/2019 12:25 AM

## 2019-06-26 NOTE — RESPIRATORY THERAPY NOTE
RT Protocol Note  Akila Urena 30 y o  female MRN: [id number]  Unit/Bed#: ED 02 Encounter: [id number]    Assessment    Principal Problem:    Acute respiratory failure with hypoxia ([facility] )  Active Problems:    Heroin abuse ([facility] )    Acute chemical pneumonitis ([facility] )    Eosinophilic bronchitis    Cigarette nicotine dependence without complication      Home Pulmonary Medications:  na       Past Medical History:   Diagnosis Date    Heroin abuse ([place] [address] )      Social History     Socioeconomic History    Marital status: Single     Spouse name: None    Number of children: None    Years of education: None    Highest education level: None   Occupational History    None   Social Needs    Financial resource strain: None    Food insecurity:     Worry: None     Inability: None    Transportation needs:     Medical: None     Non-medical: None   Tobacco Use    Smoking status: Current Every Day Smoker     Types: Cigarettes    Smokeless tobacco: Never Used   Substance and Sexual Activity    Alcohol use: No    Drug use: Yes     Types: Marijuana, Heroin     Comment: insufflation of heroin     Sexual activity: Yes     Partners: Male     Birth control/protection: Condom Male   Lifestyle    Physical activity:     Days per week: None     Minutes per session: None    Stress: None   Relationships    Social connections:     Talks on phone: None     Gets together: None     Attends [employer] service: None     Active member of club or organization: None     Attends meetings of clubs or organizations: None     Relationship status: None    Intimate partner violence:     Fear of current or ex partner: None     Emotionally abused: None     Physically abused: None     Forced sexual activity: None   Other Topics Concern    None   Social History Narrative    None       Subjective         Objective    Physical Exam:   Assessment Type: Assess only  General Appearance: Alert, Awake  Respiratory Pattern: Normal  Chest Assessment: Chest expansion symmetrical  Bilateral Breath Sounds: Diminished, Expiratory wheezes, Inspiratory wheezes, Scattered  Cough: Non-productive  O2 Device: nasal cannula    Vitals:  Blood pressure 122/56, pulse 101, temperature 97 9 °F (36 6 °C), resp  rate 18, weight 52 2 kg (115 lb 1 3 oz), SpO2 97 %  Imaging and other studies: I have personally reviewed pertinent reports  and I have personally reviewed pertinent films in PACS    O2 Device: nasal cannula     Plan    Respiratory Plan: Mild Distress pathway  Airway Clearance Plan: Incentive Spirometer     Resp Comments: Pt  evaluated at bedside per Respiratory/Airway clearance protocols  Pt  admitted for Acute Respiratory failure with hypoxia  Pt  has no prior Pulmonary Hx  except being diagnosed with pneumonia last week at another facility  Pt's breath sounds are diminised with scattered wheezes bilaterally and is in no distress at this time  CXR shows Rt  perihilar subsegmental atelectasis  Pt  instructed on IS and demonstrated the use of the device well  Will continue IS Q1  x10 as ordered while awake and Xopenex udns TID with Albuterol udns prn for SOB/Wheezing per Respiratory/Airway Clearance protocols

## 2019-06-26 NOTE — SOCIAL WORK
CM met with pt and explained CM role  Pt lives with her mother and dgt in a 2 floor home with 5 gem, PTA pt independent, does not drive and does not use any DME's  Pt relies on public transportation  Pt denies hx of HHC, IP rehab, MH and admits to hx of drug abuse  Pt denies HOST at this time  Pt has rx drug coverage and uses [facility] on [address] in [place]  No POA for pt and PCP is Mahesh Coffman MD  Primary contact is her signifcant other Lynn Oakes [phone]  CM reviewed d/c planning process including the following: identifying help at home, patient preference for d/c planning needs, Discharge Lounge, [facility] Meds to Bed program, availability of treatment team to discuss questions or concerns patient and/or family may have regarding understanding medications and recognizing signs and symptoms once discharged  CM also encouraged patient to follow up with all recommended appointments after discharge  Patient advised of importance for patient and family to participate in managing patients medical well being

## 2019-06-26 NOTE — NURSING NOTE
Pt refusing wc escort  Ambulating with a steady gait  Belongings verified and in hand  Friend at side  Given 4 scripts to be filled at pharmacy  Offers no complaints

## 2019-06-26 NOTE — DISCHARGE SUMMARY
INTERNAL MEDICINE RESIDENCY DISCHARGE SUMMARY     Ranjit Rise   30 y o  female  MRN: [id number]  Room/Bed: /MS [address] [place] [address] MED SURG 5   Encounter: [id number]    Principal Problem:    Acute respiratory failure with hypoxia [facility])  Active Problems:    Heroin abuse ([facility] )    Acute chemical pneumonitis (H[facility])    Eosinophilic bronchitis    Cigarette nicotine dependence without complication      Cigarette nicotine dependence without complication  Assessment & Plan  Smokes approximately 5 cigarettes daily since age 7  Nicotine patch low dose p r n  Smoking cessation counseling  Eosinophilic bronchitis  Assessment & Plan  Significantly improved after nebulizer treatments in the emergency department  O2 saturation is still borderline low in the 90s on room air  Steroid treatment as above  Continue Xopenex and respiratory protocol t i d  Unlikely bacterial pneumonia as there is no neutrophilia and this would typically present with decreased eosinophilic count due to suppression from endogenous glucocorticoids  Monitor off antibiotics  Acute chemical pneumonitis [facility])  Assessment & Plan  Secondary to snorting heroin recently  High probability for aspiration during insufflation of powder with unknown chemicals  Respiratory protocol as above  Peripheral eosinophilia noted consistent with drug injury versus undiagnosed asthma  Supportive care and monitoring  Will give 5 day course of prednisone at 40 mg p o  Daily  Heroin abuse ([facility] )  Assessment & Plan  Reports snorting heroin with last use earlier this evening  Case management consulted, host referral if agreeable  * Acute respiratory failure with hypoxia (HCC)  Assessment & Plan  Mild acute respiratory failure with hypoxia based on O2 saturation near 88% on room air  Currently satting 91% on room air    Respiratory protocol and O2 via nasal cannula to keep oxygen greater than 88%   Severity of symptoms as mild and secondary to the below conditions  Chano Jacobs is a 30 y o  female with history of snorting heroin otherwise no other medical problems presents for evaluation of dyspnea and cough of recent onset several days ago  Symptoms started with a productive cough with green sputum and sensation of chest congestion  She was evaluated at [facility] several days ago and per documentation was given script for prednisone and Zithromax however she confirms to me that she was not taking either of these  At that time admission was recommended however patient shows leave against medical advice  Now symptoms have worsened and she is more dyspneic and therefore called EMS  Patient was with an O2 sat of 88% on room air per EMS  She received  nebulizer treatments in the emergency department and is now satting around 91-92% on room air and does feel improved  She denies any history of fever or chills with this  She does report pleuritic chest discomfort that is located substernally      Patient takes no medications regularly, only thing tried for early relief was cough suppressants  She has no history of asthma or other chronic lung disease  She does admit to snorting heroin with last use this evening prior to coming in  She smokes about 5 cigarettes daily since age 7, drinks occasionally      She denies fever or chills  She denies decreased appetite, denies nausea or vomiting, denies abdominal pain  She has no sore throat or significant nasal congestion      DISCHARGE INFORMATION     PCP at Discharge: No PCP    Admitting Provider: Tony Hassan MD  Admission Date: 6/25/2019    Discharge Provider: Tony Hassan MD  Discharge Date: 6/26/2019    Discharge Disposition: Home/Self Care  Discharge Condition: stable  Discharge with Lines: no    Discharge Diet: regular diet  Activity Restrictions: none  Test Results Pending at Discharge: N/A    Discharge Diagnoses:  Principal Problem:    Acute respiratory failure with hypoxia ([facility])  Active Problems:    Heroin abuse (HCC)    Acute chemical pneumonitis (HCC)    Eosinophilic bronchitis    Cigarette nicotine dependence without complication  Resolved Problems:    * No resolved hospital problems  *      Consulting Providers:      Diagnostic & Therapeutic Procedures Performed:  Xr Chest Pa & Lateral    Result Date: 6/25/2019  Impression: Probable right perihilar subsegmental atelectasis  Otherwise, no acute abnormality in the chest  Workstation performed: [id number]       Code Status: Level 1 - Full Code  Advance Directive & Living Will: <no information>  Power of :    POLST:      Medications: There are no discharge medications for this patient  There are no discharge medications for this patient  There are no discharge medications for this patient  Allergies:  No Known Allergies    FOLLOW-UP     PCP Outpatient Follow-up:  Ashtyn Campbell    Consulting Providers Follow-up:  N/A    Active Issues Requiring Follow-up:   Possible Asthma    Discharge Statement:   I spent 30 minutes minutes discharging the patient  This time was spent on the day of discharge  I had direct contact with the patient on the day of discharge  Additional documentation is required if more than 30 minutes were spent on discharge  Portions of the record may have been created with voice recognition software  Occasional wrong word or "sound a like" substitutions may have occurred due to the inherent limitations of voice recognition software    Read the chart carefully and recognize, using context, where substitutions have occurred     ==  Savita Mcwilliams, [facility]  Internal Medicine Resident PGY-1

## 2019-06-26 NOTE — ASSESSMENT & PLAN NOTE
Secondary to snorting heroin recently  High probability for aspiration during insufflation of powder with unknown chemicals  Respiratory protocol as above  Peripheral eosinophilia noted consistent with drug injury versus undiagnosed asthma  Supportive care and monitoring  Will give 5 day course of prednisone at 40 mg p o  Daily

## 2019-06-26 NOTE — ASSESSMENT & PLAN NOTE
Reports snorting heroin with last use earlier this evening  Case management consulted, host referral if agreeable

## 2019-06-26 NOTE — UTILIZATION REVIEW
Initial Clinical Review    Admission: Date/Time/Statement: 6/25/19 @ 2248     Orders Placed This Encounter   Procedures    Inpatient Admission     Standing Status:   Standing     Number of Occurrences:   1     Order Specific Question:   Admitting Physician     Answer:   ESTEVAN ERICKSON [[id number]]     Order Specific Question:   Level of Care     Answer:   Med Surg [[id number]]     Order Specific Question:   Estimated length of stay     Answer:   More than 2 Midnights     Order Specific Question:   Certification     Answer:   I certify that inpatient services are medically necessary for this patient for a duration of greater than two midnights  See H&P and MD Progress Notes for additional information about the patient's course of treatment  ED Arrival Information     Expected Arrival Acuity Means of Arrival Escorted By Service Admission Type    - 6/25/2019 20:41 Urgent Ambulance [facility] General Medicine Urgent    Arrival Complaint    -        Chief Complaint   Patient presents with    Shortness of Breath     Pt SOB for several days and told she had pneumonia  Pt c/o increased SOB and per EMS pt was 88% on RA  Assessment/Plan: MS BOYCE IS A 28 YO FEMALE WHO PRESENTED TO THE ED VIA EMS FROM HOME WITH C/O WOODS, PRODUCTIVE COUGH WITH GREEN SPUTUM AND CHEST CONGESTION  SHE WAS SEEN AT [facility] SEVERAL DAYS AGO AND DID NOT GET THE SCRIPTS FOR PREDNISONE OR ZITHROMAX FILLED  SHE WAS SEEN AT [facility] EARLIER BUT LEFT AMA  SHE NOW HAS O2 SAT OF 88% PER EMS AND IS FEELING BETTER POST NEB TREATMENT IN ED WITH O2 SAT 91%  SHE HAS + SUBSTERNAL PLEURITIC CHEST DISCOMFORT  PMH: SNORTS HEROIN WHICH SHE DID PRIOR TO COMING TO ED TONIGHT AND TOBACCO ABUSE  ADMITTED TO MED SURG WITH ACUTE RESPIRATORY FAILURE WITH HYPOXIA - O2 VIA NC TO KEEP SAT > 88%  ESOSINOPHILICBRONCHITIS - XOPENEX, MONITOR OFF ANTIBIOTICS  ACUTE CHEMICAL PNEUMONITIS - HIGH PROBABILITY OF ASPIRATION DURING HEROIN INSUFFLATION, PREDNISONE 40 MG PO X 5 DAYS  ED Triage Vitals   Temperature Pulse Respirations Blood Pressure SpO2   06/25/19 2052 06/25/19 2043 06/25/19 2043 06/25/19 2043 06/25/19 2043   97 9 °F (36 6 °C) (!) 115 20 124/54 91 %      Temp Source Heart Rate Source Patient Position - Orthostatic VS BP Location FiO2 (%)   06/26/19 0335 06/26/19 0015 06/26/19 0700 06/26/19 0015 --   Oral Monitor Lying Right arm       Pain Score       06/25/19 2043       No Pain        Wt Readings from Last 1 Encounters:   06/26/19 57 7 kg (127 lb 3 2 oz)     Additional Vital Signs:   06/26/19 0801  97 8 °F (36 6 °C)  101  16  104/55  92 %  --   06/26/19 0700  --  88  16  109/56  94 %  None (Room air)   06/26/19 0528  --  92  16  108/52  93 %  None (Room air)   06/26/19 0335  97 4 °F (36 3 °C)Abnormal   --  --  --  --  --   06/26/19 0210  --  --  --  --  97 %  Nasal cannula   06/26/19 0015  --  101  18  122/56  92 %  Nasal cannula   06/25/19 2135  --  --  --  --  92 %  --   06/25/19 2052  97 9 °F (36 6 °C)  --  --  --  --  --     Pertinent Labs/Diagnostic Test Results:     6/25 CXR - Probable right perihilar subsegmental atelectasis    Otherwise, no acute abnormality in the chest     Results from last 7 days   Lab Units 06/26/19 0527 06/25/19 2112   WBC Thousand/uL 6 53 12 02*   HEMOGLOBIN g/dL 13 4 14 1   HEMATOCRIT % 41 4 43 4   PLATELETS Thousands/uL 234 265   NEUTROS ABS Thousands/µL  --  7 38     Results from last 7 days   Lab Units 06/26/19 0527 06/25/19 2112   SODIUM mmol/L  --  143   POTASSIUM mmol/L  --  3 5   CHLORIDE mmol/L  --  108   CO2 mmol/L  --  30   ANION GAP mmol/L  --  5   BUN mg/dL  --  9   CREATININE mg/dL  --  0 76   EGFR ml/min/1 73sq m  --  107   CALCIUM mg/dL  --  8 9   MAGNESIUM mg/dL 1 7  --      Results from last 7 days   Lab Units 06/25/19  2112   AST U/L 14   ALT U/L 19   ALK PHOS U/L 78   TOTAL PROTEIN g/dL 7 1   ALBUMIN g/dL 3 6   TOTAL BILIRUBIN mg/dL 0 41     Results from last 7 days   Lab Units 06/25/19  2112   GLUCOSE RANDOM mg/dL 101 Results from last 7 days   Lab Units 06/25/19 2112   PROTIME seconds 13 4   INR  1 06   PTT seconds 32     Results from last 7 days   Lab Units 06/26/19  0527 06/25/19 2112   PROCALCITONIN ng/ml <0 05 <0 05     Results from last 7 days   Lab Units 06/25/19 2112   LACTIC ACID mmol/L 2 0     ED Treatment:   Medication Administration from 06/25/2019 2041 to 06/26/2019 0757    Date/Time Order Dose Route Action   06/25/2019 2135 albuterol inhalation solution 10 mg 10 mg Nebulization Given   06/25/2019 2135 ipratropium (ATROVENT) 0 02 % inhalation solution 1 mg 1 mg Nebulization Given   06/25/2019 2135 sodium chloride 0 9 % inhalation solution 12 mL 12 mL Nebulization Given   06/25/2019 2150 methylPREDNISolone sodium succinate (Solu-MEDROL) injection 125 mg 125 mg Intravenous Given   06/26/2019 0100 lactated ringers infusion 125 mL/hr Intravenous New Bag        Past Medical History:   Diagnosis Date    Heroin abuse ([facility] )      Present on Admission:   Heroin abuse ([facility] )   Acute chemical pneumonitis ([facility] )   Eosinophilic bronchitis   Acute respiratory failure with hypoxia (HCC)   Cigarette nicotine dependence without complication    Admitting Diagnosis: Bronchospasm [J98 01]  SOB (shortness of breath) [R06 02]  Hypoxia [R09 02]     Age/Sex: 30 y o  female     Admission Orders:    Current Facility-Administered Medications:  acetaminophen 650 mg Oral Q6H PRN    albuterol 2 5 mg Nebulization Q4H PRN    lactated ringers 125 mL/hr Intravenous Continuous Last Rate: 125 mL/hr (06/26/19 1056)   levalbuterol 1 25 mg Nebulization TID    nicotine 1 patch Transdermal Daily    ondansetron 4 mg Intravenous Q6H PRN    predniSONE 40 mg Oral Daily    sodium chloride 3 mL Nebulization TID      OOB AS JORGITO   HOURLY INCENTIVE SPIROMETY   HIV   BLOOD CULTURES  REGULAR DIET   IP CONSULT TO CASE MANAGEMENT    Network Utilization Review Department  Phone: [phone];  Fax [phone]  [email or web address]  ATTENTION: Please call with any questions or concerns to [phone]  and carefully listen to the prompts so that you are directed to the right person  Send all requests for admission clinical reviews, approved or denied determinations and any other requests to fax [phone]   All voicemails are confidential

## 2019-06-26 NOTE — ED PROVIDER NOTES
History  Chief Complaint   Patient presents with    Shortness of Breath     Pt SOB for several days and told she had pneumonia  Pt c/o increased SOB and per EMS pt was 88% on RA  Patient is a 50-year-old female with a past medical history significant for heroin abuse who presents with shortness of breath and hypoxia  Patient reports that approximately 1 week ago on 6/16/19, she presented to [facility] for coughing that had been ongoing for 2 days at the time of presentation, had a chest x-ray that she was told she had pneumonia and it was recommended that she get admitted, but she decided to leave the hospital against medical advice and was prescribed an antibiotic and prednisone  Patient reports that since leaving the hospital, she has been continuing to feel short of breath and it has been becoming increasingly worse as well as her cough has been coming increasingly worse  Today, she decided to call EMS because she was so short of breath that she could not do anything  Per EMS, the patient was 88% on room air, was administered a DuoNeb and still needed supplemental oxygen to maintain saturations above 90%  Patient is a half a pack per day smoker, snorts heroin, drinks socially  Denies any medical problems or surgical history  Prior to Admission Medications   Prescriptions Last Dose Informant Patient Reported?  Taking?   metoclopramide (REGLAN) 5 mg tablet Not Taking at Unknown time  No No   Sig: Take 1 tablet (5 mg total) by mouth 4 (four) times a day as needed (nausea and vomiting)   Patient not taking: Reported on 6/25/2019   naproxen (NAPROSYN) 375 mg tablet Not Taking at Unknown time  No No   Sig: Take 1 tablet (375 mg total) by mouth 2 (two) times a day with meals   Patient not taking: Reported on 3/5/2019   ondansetron (ZOFRAN-ODT) 4 mg disintegrating tablet Not Taking at Unknown time  No No   Sig: Take 1 tablet (4 mg total) by mouth every 8 (eight) hours as needed for nausea or vomiting for up to 10 doses   Patient not taking: Reported on 6/25/2019   ondansetron (ZOFRAN-ODT) 4 mg disintegrating tablet Not Taking at Unknown time  No No   Sig: Take 1 tablet (4 mg total) by mouth every 8 (eight) hours   Patient not taking: Reported on 6/25/2019      Facility-Administered Medications: None       History reviewed  No pertinent past medical history  History reviewed  No pertinent surgical history  History reviewed  No pertinent family history  I have reviewed and agree with the history as documented  Social History     Tobacco Use    Smoking status: Current Every Day Smoker     Types: Cigarettes    Smokeless tobacco: Never Used   Substance Use Topics    Alcohol use: No    Drug use: Yes     Types: Marijuana        Review of Systems   Constitutional: Positive for fatigue  Negative for chills and fever  HENT: Negative for congestion and rhinorrhea  Eyes: Negative for photophobia and visual disturbance  Respiratory: Positive for cough, chest tightness, shortness of breath and wheezing  Cardiovascular: Negative for chest pain and palpitations  Gastrointestinal: Negative for abdominal pain, constipation, diarrhea, nausea and vomiting  Genitourinary: Negative for dysuria and hematuria  Musculoskeletal: Negative for back pain and neck pain  Skin: Negative for pallor and rash  Neurological: Negative for dizziness, syncope, weakness, light-headedness and headaches         Physical Exam  ED Triage Vitals   Temperature Pulse Respirations Blood Pressure SpO2   06/25/19 2052 06/25/19 2043 06/25/19 2043 06/25/19 2043 06/25/19 2043   97 9 °F (36 6 °C) (!) 115 20 124/54 91 %      Temp src Heart Rate Source Patient Position - Orthostatic VS BP Location FiO2 (%)   -- -- -- -- --             Pain Score       06/25/19 2043       No Pain             Orthostatic Vital Signs  Vitals:    06/25/19 2043   BP: 124/54   Pulse: (!) 115       Physical Exam   Constitutional: She is oriented to person, place, and time  She appears well-developed and well-nourished  Non-toxic appearance  She appears ill  No distress  HENT:   Head: Normocephalic and atraumatic  Right Ear: External ear normal    Left Ear: External ear normal    Nose: Nose normal    Mouth/Throat: Oropharynx is clear and moist    Eyes: Pupils are equal, round, and reactive to light  Conjunctivae and EOM are normal    Neck: Normal range of motion  Neck supple  Cardiovascular: Regular rhythm, normal heart sounds and intact distal pulses  Exam reveals no gallop and no friction rub  No murmur heard  Tachycardic   Pulmonary/Chest: Accessory muscle usage present  No stridor  Tachypnea noted  No respiratory distress  She has decreased breath sounds  She has wheezes  She has no rhonchi  She has no rales  She exhibits no tenderness  Abdominal: Soft  Bowel sounds are normal  She exhibits no distension  There is no tenderness  There is no rebound and no guarding  No hernia  Musculoskeletal: Normal range of motion  She exhibits no edema  Right lower leg: Normal         Left lower leg: Normal    Neurological: She is alert and oriented to person, place, and time  Skin: Skin is warm and dry  Capillary refill takes less than 2 seconds  No rash noted  No erythema  No pallor  Psychiatric: She has a normal mood and affect  Her behavior is normal    Nursing note and vitals reviewed        ED Medications  Medications   albuterol (FOR EMS ONLY) (2 5 mg/3 mL) 0 083 % inhalation solution 2 5 mg (has no administration in time range)   ipratropium-albuterol (FOR EMS ONLY) (DUO-NEB) 0 5-2 5 mg/3 mL inhalation solution 3 mL (has no administration in time range)   albuterol inhalation solution 10 mg (10 mg Nebulization Given 6/25/19 2135)   ipratropium (ATROVENT) 0 02 % inhalation solution 1 mg (1 mg Nebulization Given 6/25/19 2135)   sodium chloride 0 9 % inhalation solution 12 mL (12 mL Nebulization Given 6/25/19 2135)   methylPREDNISolone sodium succinate (Solu-MEDROL) injection 125 mg (125 mg Intravenous Given 6/25/19 2150)       Diagnostic Studies  Results Reviewed     Procedure Component Value Units Date/Time    Procalcitonin [[id number]]  (Normal) Collected:  06/25/19 2112    Lab Status:  Final result Specimen:  Blood from Arm, Left Updated:  06/25/19 2210     Procalcitonin <0 05 ng/ml     Protime-INR [[id number]]  (Normal) Collected:  06/25/19 2112    Lab Status:  Final result Specimen:  Blood from Arm, Left Updated:  06/25/19 2158     Protime 13 4 seconds      INR 1 06    APTT [[id number]]  (Normal) Collected:  06/25/19 2112    Lab Status:  Final result Specimen:  Blood from Arm, Left Updated:  06/25/19 2158     PTT 32 seconds     Lactic acid x2 [[id number]]  (Normal) Collected:  06/25/19 2112    Lab Status:  Final result Specimen:  Blood from Arm, Left Updated:  06/25/19 2154     LACTIC ACID 2 0 mmol/L     Narrative:       Result may be elevated if tourniquet was used during collection      Comprehensive metabolic panel [[id number]] Collected:  06/25/19 2112    Lab Status:  Final result Specimen:  Blood from Arm, Left Updated:  06/25/19 2152     Sodium 143 mmol/L      Potassium 3 5 mmol/L      Chloride 108 mmol/L      CO2 30 mmol/L      ANION GAP 5 mmol/L      BUN 9 mg/dL      Creatinine 0 76 mg/dL      Glucose 101 mg/dL      Calcium 8 9 mg/dL      AST 14 U/L      ALT 19 U/L      Alkaline Phosphatase 78 U/L      Total Protein 7 1 g/dL      Albumin 3 6 g/dL      Total Bilirubin 0 41 mg/dL      eGFR 107 ml/min/1 73sq m     Narrative:       [place] guidelines for Chronic Kidney Disease (CKD):     Stage 1 with normal or high GFR (GFR > 90 mL/min/1 73 square meters)    Stage 2 Mild CKD (GFR = 60-89 mL/min/1 73 square meters)    Stage 3A Moderate CKD (GFR = 45-59 mL/min/1 73 square meters)    Stage 3B Moderate CKD (GFR = 30-44 mL/min/1 73 square meters)    Stage 4 Severe CKD (GFR = 15-29 mL/min/1 73 square meters)    Stage 5 End Stage CKD (GFR <15 mL/min/1 73 square meters)  Note: GFR calculation is accurate only with a steady state creatinine    CBC and differential [[id number]]  (Abnormal) Collected:  06/25/19 2112    Lab Status:  Final result Specimen:  Blood from Arm, Left Updated:  06/25/19 2139     WBC 12 02 Thousand/uL      RBC 4 82 Million/uL      Hemoglobin 14 1 g/dL      Hematocrit 43 4 %      MCV 90 fL      MCH 29 3 pg      MCHC 32 5 g/dL      RDW 12 1 %      MPV 10 8 fL      Platelets 519 Thousands/uL      nRBC 0 /100 WBCs      Neutrophils Relative 62 %      Immat GRANS % 0 %      Lymphocytes Relative 25 %      Monocytes Relative 6 %      Eosinophils Relative 7 %      Basophils Relative 0 %      Neutrophils Absolute 7 38 Thousands/µL      Immature Grans Absolute 0 02 Thousand/uL      Lymphocytes Absolute 3 01 Thousands/µL      Monocytes Absolute 0 71 Thousand/µL      Eosinophils Absolute 0 85 Thousand/µL      Basophils Absolute 0 05 Thousands/µL     Blood culture #1 [[id number]] Collected:  06/25/19 2112    Lab Status: In process Specimen:  Blood from Arm, Left Updated:  06/25/19 2129    Blood culture #2 [[id number]] Collected:  06/25/19 2124    Lab Status: In process Specimen:  Blood from Arm, Right Updated:  06/25/19 2129    Lactic acid x2 [[id number]]     Lab Status:  No result Specimen:  Blood                  XR chest pa & lateral   ED Interpretation by Tessa Vigil MD (06/25 2126)   Peribronchial cuffing      Final Result by Marsha Jerez MD (06/25 2139)      Probable right perihilar subsegmental atelectasis  Otherwise, no acute abnormality in the chest             Workstation performed: [id number]               Procedures  Procedures        ED Course  ED Course as of Jun 25 2250   Tue Jun 25, 2019 2236 CXR does not appear to be PNA  Believe tachycardia, hypoxia is from bronchospasm which is being treated with hour long neb and steroids  Will hold on antibiotics at this time  MDM  Number of Diagnoses or Management Options  Bronchospasm:   Hypoxia:   Diagnosis management comments: Assessment and plan:  28-year-old female with prior look diagnosis of pneumonia who presents hypoxic and short of breath  On exam, the patient has decreased breath sounds bilaterally with scattered wheezing throughout, is saturating 92% on 3 L supplemental oxygen  Will administer hour long nebulizer treatment, give 125 mg of Solu-Medrol, a repeat chest x-ray to evaluate for pneumonia, infectious workup including labs for leukocytosis, electrolyte abnormalities  IV fluids  Patient will require admission  Review of medical records shows that CXR at [address] [place] [address] [address] said: Slightly increased patchy right upper and lower lobe opacity probably representing subsegmental atelectasis, however atypical infection also considered  In current setting with another chest x-ray that does not show true consolidation, believe that patient's hypoxia is related to bronchospasm and not infectious etiology  Will monitor off antibiotics at this time, admit for further nebulizer treatments and supplemental oxygen  Disposition  Final diagnoses:   Hypoxia   Bronchospasm     Time reflects when diagnosis was documented in both MDM as applicable and the Disposition within this note     Time User Action Codes Description Comment    6/25/2019  9:46 PM Kaylen Miranda Add [R09 02] Hypoxia     6/25/2019  9:47 PM Kaylen Miranda Add [J98 01] Bronchospasm       ED Disposition     ED Disposition Condition Date/Time Comment    Admit Stable Tue Jun 25, 2019 10:47 PM Case was discussed with SOD resident and the patient's admission status was agreed to be Admission Status: inpatient status to the service of Dr Denver Paganini   Follow-up Information    None         Patient's Medications   Discharge Prescriptions    No medications on file     No discharge procedures on file      ED Provider  Attending physically available and evaluated Alisia Candelaria I managed the patient along with the ED Attending      Electronically Signed by         Vikas Melton DO  06/25/19 3798

## 2019-06-26 NOTE — ASSESSMENT & PLAN NOTE
Significantly improved after nebulizer treatments in the emergency department  O2 saturation is still borderline low in the 90s on room air  Steroid treatment as above  Continue Xopenex and respiratory protocol t i d  Unlikely bacterial pneumonia as there is no neutrophilia and this would typically present with decreased eosinophilic count due to suppression from endogenous glucocorticoids  Monitor off antibiotics

## 2019-06-26 NOTE — PLAN OF CARE
Problem: Potential for Falls  Goal: Patient will remain free of falls  Description  INTERVENTIONS:  - Assess patient frequently for physical needs  -  Identify cognitive and physical deficits and behaviors that affect risk of falls    -  [place] fall precautions as indicated by assessment   - Educate patient/family on patient safety including physical limitations  - Instruct patient to call for assistance with activity based on assessment  - Modify environment to reduce risk of injury  - Consider OT/PT consult to assist with strengthening/mobility  Outcome: Progressing     Problem: PAIN - ADULT  Goal: Verbalizes/displays adequate comfort level or baseline comfort level  Description  Interventions:  - Encourage patient to monitor pain and request assistance  - Assess pain using appropriate pain scale  - Administer analgesics based on type and severity of pain and evaluate response  - Implement non-pharmacological measures as appropriate and evaluate response  - Consider cultural and social influences on pain and pain management  - Notify physician/advanced practitioner if interventions unsuccessful or patient reports new pain  Outcome: Progressing     Problem: INFECTION - ADULT  Goal: Absence or prevention of progression during hospitalization  Description  INTERVENTIONS:  - Assess and monitor for signs and symptoms of infection  - Monitor lab/diagnostic results  - Monitor all insertion sites, i e  indwelling lines, tubes, and drains  - Monitor endotracheal (as able) and nasal secretions for changes in amount and color  - [place] appropriate cooling/warming therapies per order  - Administer medications as ordered  - Instruct and encourage patient and family to use good hand hygiene technique  - Identify and instruct in appropriate isolation precautions for identified infection/condition  Outcome: Progressing  Goal: Absence of fever/infection during neutropenic period  Description  INTERVENTIONS:  - Monitor WBC  - Implement neutropenic guidelines  Outcome: Progressing     Problem: SAFETY ADULT  Goal: Maintain or return to baseline ADL function  Description  INTERVENTIONS:  -  Assess patient's ability to carry out ADLs; assess patient's baseline for ADL function and identify physical deficits which impact ability to perform ADLs (bathing, care of mouth/teeth, toileting, grooming, dressing, etc )  - Assess/evaluate cause of self-care deficits   - Assess range of motion  - Assess patient's mobility; develop plan if impaired  - Assess patient's need for assistive devices and provide as appropriate  - Encourage maximum independence but intervene and supervise when necessary  ¯ Involve family in performance of ADLs  ¯ Assess for home care needs following discharge   ¯ Request OT consult to assist with ADL evaluation and planning for discharge  ¯ Provide patient education as appropriate  Outcome: Progressing  Goal: Maintain or return mobility status to optimal level  Description  INTERVENTIONS:  - Assess patient's baseline mobility status (ambulation, transfers, stairs, etc )    - Identify cognitive and physical deficits and behaviors that affect mobility  - Identify mobility aids required to assist with transfers and/or ambulation (gait belt, sit-to-stand, lift, walker, cane, etc )  - [place] fall precautions as indicated by assessment  - Record patient progress and toleration of activity level on Mobility SBAR; progress patient to next Phase/Stage  - Instruct patient to call for assistance with activity based on assessment  - Request Rehabilitation consult to assist with strengthening/weightbearing, etc   Outcome: Progressing     Problem: DISCHARGE PLANNING  Goal: Discharge to home or other facility with appropriate resources  Description  INTERVENTIONS:  - Identify barriers to discharge w/patient and caregiver  - Arrange for needed discharge resources and transportation as appropriate  - Identify discharge learning needs (meds, wound care, etc )  - Arrange for interpretive services to assist at discharge as needed  - Refer to Case Management Department for coordinating discharge planning if the patient needs post-hospital services based on physician/advanced practitioner order or complex needs related to functional status, cognitive ability, or social support system  Outcome: Progressing     Problem: Knowledge Deficit  Goal: Patient/family/caregiver demonstrates understanding of disease process, treatment plan, medications, and discharge instructions  Description  Complete learning assessment and assess knowledge base    Interventions:  - Provide teaching at level of understanding  - Provide teaching via preferred learning methods  Outcome: Progressing

## 2019-06-26 NOTE — ASSESSMENT & PLAN NOTE
Smokes approximately 5 cigarettes daily since age 7  Nicotine patch low dose p r n  Smoking cessation counseling

## 2019-06-26 NOTE — ASSESSMENT & PLAN NOTE
Mild acute respiratory failure with hypoxia based on O2 saturation near 88% on room air  Currently satting 91% on room air  Respiratory protocol and O2 via nasal cannula to keep oxygen greater than 88%  Severity of symptoms as mild and secondary to the below conditions

## 2019-06-26 NOTE — ED ATTENDING ATTESTATION
Nkechi Mike MD, saw and evaluated the patient  All available labs and X-rays were ordered by me or the resident and have been reviewed by myself  I discussed the patient with the resident / non-physician and agree with the resident's / non-physician practitioner's findings and plan as documented in the resident's / non-physician practicitioner's note, except where noted  At this point, I agree with the current assessment done in the ED  I was present during key portions of all procedures performed unless otherwise stated  Chief Complaint   Patient presents with    Shortness of Breath     Pt SOB for several days and told she had pneumonia  Pt c/o increased SOB and per EMS pt was 88% on RA  This is a 29 y/o F presenting for evaluation of dyspnea  For about 1 week now she has had coughing, dyspnea  She was given azithromcyin and has been compliant but has had no improvement  She feels worse in fact so came in  SUbjective fevers  No nausea/vomiting  Chest tightness  88% on RA  No hx of asthma  PE:  Vitals:    06/25/19 2043 06/25/19 2052 06/25/19 2135   BP: 124/54     Pulse: (!) 115     Resp: 20     Temp:  97 9 °F (36 6 °C)    SpO2: 91%  92%   Weight: 52 2 kg (115 lb 1 3 oz)     General: VS reviewed  Frequent coughing  No conversational dyspnea  awake, alert  Well-nourished, well-developed  Appears stated age  Speaking normally in full sentences  Head: Normocephalic, atraumatic, nontender  Eyes: PERRL, EOM-I  No diplopia  No hyphema  No subconjunctival hemorrhages  Symmetrical lids  ENT: Atraumatic external nose and ears  Dry MM  No malocclusion  No stridor  Normal phonation  No drooling  Normal swallowing  Neck: Symmetric, trachea midline  No JVD  CV: tachycardic ()   +S1/S2  No murmurs   Peripheral pulses +2 throughout  No chest wall tenderness  Lungs:   Mildly labored   No retractions  Wheezing (R>L)  +tachypnea     Normoxic on 2L  Abd: +BS, soft, NT/ND    MSK: FROM   Back:   No rashes  Skin: Dry, intact  Neuro: AAOx3, GCS 15, CN II-XII grossly intact  Motor grossly intact  Psychiatric/Behavioral: Appropriate mood and affect   Exam: deferred  A:  - Dyspnea  - Wheezing  - Cough  - Hypoxia  P:  - CXR for pna  - Labs,  - Nebs  - Admit given failure of treatment given appearance  - 13 point ROS was performed and all are normal unless stated in the history above  - Nursing note reviewed  Vitals reviewed  - Orders placed by myself and/or advanced practitioner / resident     - Previous chart was reviewed  - No language barrier    - History obtained from patient  - There are no limitations to the history obtained  - Critical care time: 31 minutes  - Critical care time was exclusive of seperately bilable procedures and treating other patients as well as teaching time  - Critical care was necessary to treat or prevent imminent or life-threatening deterioration of the following condition: dyspnea / hypoxia  - Critical care time was spent personally by me on the following activities as well as the above as per the ED course and rest of chart: blood draw for specimens, obtaining history from patient / surrogate, developement of a treatment plan, discussions with consultants, evaluation of patient's response to the treatment, examination of the patient, ordering/performing treatements and interventions, re-evaluation of the patient's condition, review of old charts, ordering/reviewing laboratory studies, ordering/reviewing of radiographic studies    Final Diagnosis:  1  Hypoxia    2   Bronchospasm        ED Course as of Jun 25 2252 Tue Jun 25, 2019 2246 Procalcitonin: <0 05     Medications   albuterol (FOR EMS ONLY) (2 5 mg/3 mL) 0 083 % inhalation solution 2 5 mg (has no administration in time range)   ipratropium-albuterol (FOR EMS ONLY) (DUO-NEB) 0 5-2 5 mg/3 mL inhalation solution 3 mL (has no administration in time range)   albuterol inhalation solution 10 mg (10 mg Nebulization Given 6/25/19 2135)   ipratropium (ATROVENT) 0 02 % inhalation solution 1 mg (1 mg Nebulization Given 6/25/19 2135)   sodium chloride 0 9 % inhalation solution 12 mL (12 mL Nebulization Given 6/25/19 2135)   methylPREDNISolone sodium succinate (Solu-MEDROL) injection 125 mg (125 mg Intravenous Given 6/25/19 2150)     XR chest pa & lateral   ED Interpretation   Peribronchial cuffing      Final Result      Probable right perihilar subsegmental atelectasis     Otherwise, no acute abnormality in the chest             Workstation performed: [id number]           Orders Placed This Encounter   Procedures    Blood culture #1    Blood culture #2    XR chest pa & lateral    CBC and differential    Comprehensive metabolic panel    Lactic acid x2    Procalcitonin    Protime-INR    APTT    Continuous cardiac monitoring    Continuous pulse oximetry    Nasal cannula oxygen    Continuous Pulse Oximetry    Peak flow    Inpatient Admission     Labs Reviewed   CBC AND DIFFERENTIAL - Abnormal       Result Value Ref Range Status    WBC 12 02 (*) 4 31 - 10 16 Thousand/uL Final    RBC 4 82  3 81 - 5 12 Million/uL Final    Hemoglobin 14 1  11 5 - 15 4 g/dL Final    Hematocrit 43 4  34 8 - 46 1 % Final    MCV 90  82 - 98 fL Final    MCH 29 3  26 8 - 34 3 pg Final    MCHC 32 5  31 4 - 37 4 g/dL Final    RDW 12 1  11 6 - 15 1 % Final    MPV 10 8  8 9 - 12 7 fL Final    Platelets 655  020 - 390 Thousands/uL Final    nRBC 0  /100 WBCs Final    Neutrophils Relative 62  43 - 75 % Final    Immat GRANS % 0  0 - 2 % Final    Lymphocytes Relative 25  14 - 44 % Final    Monocytes Relative 6  4 - 12 % Final    Eosinophils Relative 7 (*) 0 - 6 % Final    Basophils Relative 0  0 - 1 % Final    Neutrophils Absolute 7 38  1 85 - 7 62 Thousands/µL Final    Immature Grans Absolute 0 02  0 00 - 0 20 Thousand/uL Final    Lymphocytes Absolute 3 01  0 60 - 4 47 Thousands/µL Final    Monocytes Absolute 0 71  0 17 - 1 22 Thousand/µL Final    Eosinophils Absolute 0 85 (*) 0 00 - 0 61 Thousand/µL Final    Basophils Absolute 0 05  0 00 - 0 10 Thousands/µL Final   LACTIC ACID, PLASMA - Normal    LACTIC ACID 2 0  0 5 - 2 0 mmol/L Final    Narrative:     Result may be elevated if tourniquet was used during collection  PROCALCITONIN TEST - Normal    Procalcitonin <0 05  <=0 25 ng/ml Final    Comment: Suspected Lower Respiratory Tract Infection (LRTI):  - LESS than or EQUAL to 0 25 ng/mL:   low likelihood for bacterial LRTI; antibiotics DISCOURAGED   - GREATER than 0 25 ng/mL:   increased likelihood for bacterial LRTI; antibiotics ENCOURAGED  Suspected Sepsis:  - Strongly consider initiating antibiotics in ALL UNSTABLE patients  - LESS than or EQUAL to 0 5 ng/mL:   low likelihood for bacterial sepsis; antibiotics DISCOURAGED   - GREATER than 0 5 ng/mL:   increased likelihood for bacterial sepsis; antibiotics ENCOURAGED   - GREATER than 2 ng/mL:   high risk for severe sepsis / septic shock; antibiotics strongly ENCOURAGED  Decisions on antibiotic use should not be based solely on Procalcitonin (PCT) levels  If PCT is low but uncertainty exists with stopping antibiotics, repeat PCT in 6-24 hours to confirm the low level  If antibiotics are administered (regardless if initial PCT was high or low), repeat PCT every 1-2 days to consider early antibiotic cessation (when GREATER than 80% decrease from the peak OR when PCT drops below designated cutoffs, whichever comes first), so long as the infection is NOT one that typically requires prolonged treatment durations (e g , bone/joint infections, endocarditis, Staph  aureus bacteremia)      Situations of FALSE-POSITIVE Procalcitonin values:  1) Newborns < 67 hours old  2) Massive stress from severe trauma / burns, major surgery, acute pancreatitis, cardiogenic / hemorrhagic shock, sickle cell crisis, or other organ perfusion abnormalities  3) Malaria and some Candidal infections  4) Treatment with agents that stimulate cytokines (e g , OKT3, anti-lymphocyte globulins, alemtuzumab, IL-2, granulocyte transfusion [NOT GCSFs])  5) Chronic renal disease causes elevated baseline levels (consider GREATER than 0 75 ng/mL as an abnormal cut-off); initiating HD/CRRT may cause transient decreases  6) Paraneoplastic syndromes from medullary thyroid or SCLC, some forms of vasculitis, and acute [id number] disease    Situations of FALSE-NEGATIVE Procalcitonin values:  1) Too early in clinical course for PCT to have reached its peak (may repeat in 6-24 hours to confirm low level)  2) Localized infection WITHOUT systemic (SIRS / sepsis) response (e g , an abscess, osteomyelitis, cystitis)  3) Mycobacteria (e g , Tuberculosis, MAC)  4) Cystic fibrosis exacerbations     PROTIME-INR - Normal    Protime 13 4  11 6 - 14 5 seconds Final    INR 1 06  0 84 - 1 19 Final   APTT - Normal    PTT 32  23 - 37 seconds Final    Comment: Therapeutic Heparin Range =  60-90 seconds   BLOOD CULTURE   BLOOD CULTURE   COMPREHENSIVE METABOLIC PANEL    Sodium 079  136 - 145 mmol/L Final    Potassium 3 5  3 5 - 5 3 mmol/L Final    Chloride 108  100 - 108 mmol/L Final    CO2 30  21 - 32 mmol/L Final    ANION GAP 5  4 - 13 mmol/L Final    BUN 9  5 - 25 mg/dL Final    Creatinine 0 76  0 60 - 1 30 mg/dL Final    Comment: Standardized to IDMS reference method    Glucose 101  65 - 140 mg/dL Final    Comment:   If the patient is fasting, the ADA then defines impaired fasting glucose as > 100 mg/dL and diabetes as > or equal to 123 mg/dL  Specimen collection should occur prior to Sulfasalazine administration due to the potential for falsely depressed results  Specimen collection should occur prior to Sulfapyridine administration due to the potential for falsely elevated results      Calcium 8 9  8 3 - 10 1 mg/dL Final    AST 14  5 - 45 U/L Final    Comment:   Specimen collection should occur prior to Sulfasalazine administration due to the potential for falsely depressed results  ALT 19  12 - 78 U/L Final    Comment:   Specimen collection should occur prior to Sulfasalazine and/or Sulfapyridine administration due to the potential for falsely depressed results  Alkaline Phosphatase 78  46 - 116 U/L Final    Total Protein 7 1  6 4 - 8 2 g/dL Final    Albumin 3 6  3 5 - 5 0 g/dL Final    Total Bilirubin 0 41  0 20 - 1 00 mg/dL Final    eGFR 107  ml/min/1 73sq m Final    Narrative:     [place] guidelines for Chronic Kidney Disease (CKD):     Stage 1 with normal or high GFR (GFR > 90 mL/min/1 73 square meters)    Stage 2 Mild CKD (GFR = 60-89 mL/min/1 73 square meters)    Stage 3A Moderate CKD (GFR = 45-59 mL/min/1 73 square meters)    Stage 3B Moderate CKD (GFR = 30-44 mL/min/1 73 square meters)    Stage 4 Severe CKD (GFR = 15-29 mL/min/1 73 square meters)    Stage 5 End Stage CKD (GFR <15 mL/min/1 73 square meters)  Note: GFR calculation is accurate only with a steady state creatinine   LACTIC ACID, PLASMA     Time reflects when diagnosis was documented in both MDM as applicable and the Disposition within this note     Time User Action Codes Description Comment    6/25/2019  9:46 PM Dina Alexandre [[id number]] Hypoxia     6/25/2019  9:47 PM Dina Alexandre [[id number]] Bronchospasm       ED Disposition     ED Disposition Condition Date/Time Comment    Admit Stable Tue Jun 25, 2019 10:47 PM Case was discussed with SOD resident and the patient's admission status was agreed to be Admission Status: inpatient status to the service of Dr Tony Ch   Follow-up Information    None       Patient's Medications   Discharge Prescriptions    No medications on file     No discharge procedures on file  Prior to Admission Medications   Prescriptions Last Dose Informant Patient Reported?  Taking?   metoclopramide (REGLAN) 5 mg tablet Not Taking at Unknown time  No No   Sig: Take 1 tablet (5 mg total) by mouth 4 (four) times a day as needed (nausea and vomiting)   Patient not taking: Reported on 6/25/2019   naproxen (NAPROSYN) 375 mg tablet Not Taking at Unknown time  No No   Sig: Take 1 tablet (375 mg total) by mouth 2 (two) times a day with meals   Patient not taking: Reported on 3/5/2019   ondansetron (ZOFRAN-ODT) 4 mg disintegrating tablet Not Taking at Unknown time  No No   Sig: Take 1 tablet (4 mg total) by mouth every 8 (eight) hours as needed for nausea or vomiting for up to 10 doses   Patient not taking: Reported on 6/25/2019   ondansetron (ZOFRAN-ODT) 4 mg disintegrating tablet Not Taking at Unknown time  No No   Sig: Take 1 tablet (4 mg total) by mouth every 8 (eight) hours   Patient not taking: Reported on 6/25/2019      Facility-Administered Medications: None       Portions of the record may have been created with voice recognition software  Occasional wrong word or "sound a like" substitutions may have occurred due to the inherent limitations of voice recognition software  Read the chart carefully and recognize, using context, where substitutions have occurred      Electronically signed by:  Jean Pierre Vargas

## 2019-06-30 LAB
BACTERIA BLD CULT: NORMAL
BACTERIA BLD CULT: NORMAL

## 2019-07-08 ENCOUNTER — HOSPITAL ENCOUNTER (EMERGENCY)
Facility: HOSPITAL | Age: 29
Discharge: HOME | End: 2019-07-08
Attending: EMERGENCY MEDICINE
Payer: COMMERCIAL

## 2019-07-08 VITALS
DIASTOLIC BLOOD PRESSURE: 54 MMHG | SYSTOLIC BLOOD PRESSURE: 115 MMHG | TEMPERATURE: 98.7 F | OXYGEN SATURATION: 100 % | HEART RATE: 78 BPM | RESPIRATION RATE: 20 BRPM

## 2019-07-08 DIAGNOSIS — F11.23 HEROIN WITHDRAWAL (HCC): Primary | ICD-10-CM

## 2019-07-08 LAB
ALBUMIN SERPL BCP-MCNC: 3.8 G/DL (ref 3.5–5)
ALP SERPL-CCNC: 81 U/L (ref 46–116)
ALT SERPL W P-5'-P-CCNC: 22 U/L (ref 12–78)
ANION GAP SERPL CALCULATED.3IONS-SCNC: 6 MMOL/L (ref 4–13)
AST SERPL W P-5'-P-CCNC: 29 U/L (ref 5–45)
BASOPHILS # BLD AUTO: 0.04 THOUSANDS/ΜL (ref 0–0.1)
BASOPHILS NFR BLD AUTO: 0 % (ref 0–1)
BILIRUB SERPL-MCNC: 1.05 MG/DL (ref 0.2–1)
BUN SERPL-MCNC: 14 MG/DL (ref 5–25)
CALCIUM SERPL-MCNC: 9.2 MG/DL (ref 8.3–10.1)
CHLORIDE SERPL-SCNC: 107 MMOL/L (ref 100–108)
CO2 SERPL-SCNC: 24 MMOL/L (ref 21–32)
CREAT SERPL-MCNC: 0.71 MG/DL (ref 0.6–1.3)
EOSINOPHIL # BLD AUTO: 0.11 THOUSAND/ΜL (ref 0–0.61)
EOSINOPHIL NFR BLD AUTO: 1 % (ref 0–6)
ERYTHROCYTE [DISTWIDTH] IN BLOOD BY AUTOMATED COUNT: 11.9 % (ref 11.6–15.1)
ETHANOL EXG-MCNC: 0 MG/DL
GFR SERPL CREATININE-BSD FRML MDRD: 116 ML/MIN/1.73SQ M
GLUCOSE SERPL-MCNC: 92 MG/DL (ref 65–140)
HCT VFR BLD AUTO: 45.8 % (ref 34.8–46.1)
HGB BLD-MCNC: 15.5 G/DL (ref 11.5–15.4)
IMM GRANULOCYTES # BLD AUTO: 0.02 THOUSAND/UL (ref 0–0.2)
IMM GRANULOCYTES NFR BLD AUTO: 0 % (ref 0–2)
LIPASE SERPL-CCNC: 114 U/L (ref 73–393)
LYMPHOCYTES # BLD AUTO: 2.01 THOUSANDS/ΜL (ref 0.6–4.47)
LYMPHOCYTES NFR BLD AUTO: 21 % (ref 14–44)
MCH RBC QN AUTO: 29.2 PG (ref 26.8–34.3)
MCHC RBC AUTO-ENTMCNC: 33.8 G/DL (ref 31.4–37.4)
MCV RBC AUTO: 86 FL (ref 82–98)
MONOCYTES # BLD AUTO: 0.43 THOUSAND/ΜL (ref 0.17–1.22)
MONOCYTES NFR BLD AUTO: 4 % (ref 4–12)
NEUTROPHILS # BLD AUTO: 7.13 THOUSANDS/ΜL (ref 1.85–7.62)
NEUTS SEG NFR BLD AUTO: 74 % (ref 43–75)
NRBC BLD AUTO-RTO: 0 /100 WBCS
PLATELET # BLD AUTO: 261 THOUSANDS/UL (ref 149–390)
PMV BLD AUTO: 10.7 FL (ref 8.9–12.7)
POTASSIUM SERPL-SCNC: 4.1 MMOL/L (ref 3.5–5.3)
PROT SERPL-MCNC: 7.8 G/DL (ref 6.4–8.2)
RBC # BLD AUTO: 5.31 MILLION/UL (ref 3.81–5.12)
SODIUM SERPL-SCNC: 137 MMOL/L (ref 136–145)
WBC # BLD AUTO: 9.74 THOUSAND/UL (ref 4.31–10.16)

## 2019-07-08 PROCEDURE — 83690 ASSAY OF LIPASE: CPT | Performed by: EMERGENCY MEDICINE

## 2019-07-08 PROCEDURE — 99284 EMERGENCY DEPT VISIT MOD MDM: CPT

## 2019-07-08 PROCEDURE — 99283 EMERGENCY DEPT VISIT LOW MDM: CPT | Performed by: EMERGENCY MEDICINE

## 2019-07-08 PROCEDURE — 36415 COLL VENOUS BLD VENIPUNCTURE: CPT | Performed by: EMERGENCY MEDICINE

## 2019-07-08 PROCEDURE — 85025 COMPLETE CBC W/AUTO DIFF WBC: CPT | Performed by: EMERGENCY MEDICINE

## 2019-07-08 PROCEDURE — 82075 ASSAY OF BREATH ETHANOL: CPT | Performed by: EMERGENCY MEDICINE

## 2019-07-08 PROCEDURE — 80053 COMPREHEN METABOLIC PANEL: CPT | Performed by: EMERGENCY MEDICINE

## 2019-07-08 RX ORDER — ONDANSETRON 4 MG/1
4 TABLET, ORALLY DISINTEGRATING ORAL EVERY 8 HOURS PRN
Qty: 9 TABLET | Refills: 0 | Status: ON HOLD | OUTPATIENT
Start: 2019-07-08 | End: 2021-02-17

## 2019-07-08 RX ORDER — CLONIDINE HYDROCHLORIDE 0.1 MG/1
0.1 TABLET ORAL ONCE
Status: COMPLETED | OUTPATIENT
Start: 2019-07-08 | End: 2019-07-08

## 2019-07-08 RX ORDER — LOPERAMIDE HYDROCHLORIDE 2 MG/1
2 CAPSULE ORAL ONCE
Status: COMPLETED | OUTPATIENT
Start: 2019-07-08 | End: 2019-07-08

## 2019-07-08 RX ORDER — CLONIDINE HYDROCHLORIDE 0.2 MG/1
0.1 TABLET ORAL 3 TIMES DAILY PRN
Qty: 3 TABLET | Refills: 0 | Status: ON HOLD | OUTPATIENT
Start: 2019-07-08 | End: 2021-02-17

## 2019-07-08 RX ORDER — IBUPROFEN 600 MG/1
600 TABLET ORAL ONCE
Status: COMPLETED | OUTPATIENT
Start: 2019-07-08 | End: 2019-07-08

## 2019-07-08 RX ORDER — ONDANSETRON 2 MG/ML
4 INJECTION INTRAMUSCULAR; INTRAVENOUS ONCE
Status: DISCONTINUED | OUTPATIENT
Start: 2019-07-08 | End: 2019-07-08

## 2019-07-08 RX ORDER — KETOROLAC TROMETHAMINE 30 MG/ML
15 INJECTION, SOLUTION INTRAMUSCULAR; INTRAVENOUS ONCE
Status: DISCONTINUED | OUTPATIENT
Start: 2019-07-08 | End: 2019-07-08

## 2019-07-08 RX ORDER — ONDANSETRON 4 MG/1
4 TABLET, ORALLY DISINTEGRATING ORAL ONCE
Status: COMPLETED | OUTPATIENT
Start: 2019-07-08 | End: 2019-07-08

## 2019-07-08 RX ADMIN — ONDANSETRON 4 MG: 4 TABLET, ORALLY DISINTEGRATING ORAL at 08:11

## 2019-07-08 RX ADMIN — IBUPROFEN 600 MG: 600 TABLET ORAL at 08:11

## 2019-07-08 RX ADMIN — CLONIDINE HYDROCHLORIDE 0.1 MG: 0.1 TABLET ORAL at 08:11

## 2019-07-08 RX ADMIN — LOPERAMIDE HYDROCHLORIDE 2 MG: 2 CAPSULE ORAL at 14:35

## 2019-07-08 NOTE — ED NOTES
Nurys from [facility] met with pt  Pt is insisting on a local detox  [facility] stated [facility] will have a bed on Wednesday  Pt is aware and in agreement for Wednesday   She will be d/c home and follow up with HOST

## 2019-07-08 NOTE — ED ATTENDING ATTESTATION
I,Elian Valadez MD, saw and evaluated the patient  I have discussed the patient with the resident/non-physician practitioner and agree with the resident's/non-physician practitioner's findings, Plan of Care, and MDM as documented in the resident's/non-physician practitioner's note, except where noted  All available labs and Radiology studies were reviewed  I was present for key portions of any procedure(s) performed by the resident/non-physician practitioner and I was immediately available to provide assistance  At this point I agree with the current assessment done in the Emergency Department  I have conducted an independent evaluation of this patient including a focused history and a physical exam         45-year-old female, presenting to the emergency department for evaluation of heroin withdrawal   Patient reports that she is a daily heroin user, insult plates heroin, denies IV use, states her last use was 2 days ago  Reports that she has been using Suboxone for the past day, but states that this morning she was unable to tolerate the Suboxone secondary to nausea and vomiting  Patient is presenting to the emergency department with complaints of feeling general malaise, generalized myalgias, nausea, vomiting  No reported fever, cough, shortness of breath  Patient has had recent hospitalizations for hypoxic respiratory issues associated with heroin use  Ten systems reviewed negative except as noted in the history of present illness  On examination the patient is sitting upright in the stretcher  She appears to be moderately uncomfortable  There is no active vomiting in the room  Head is normocephalic and atraumatic  Eyelids lashes are normal   There is no conjunctival icterus  Pupils are 3 mm bilaterally reactive  Mucous membranes are dry  Neck is supple without meningismus  Lungs are clear to auscultation bilaterally  Heart is regular rate and rhythm with no murmurs rubs or gallops  Abdomen is nondistended, is soft, and the patient has some mild generalized lower abdominal tenderness  There is no rebound or guarding  There is no focal tenderness over McBurney's point  Skin is significant for pyloerection  GCS is 15  Cranial nerves 2-12 are grossly intact  Motor is 5/5 bilateral upper lower extremities  Assessment and plan:  27-year-old female presenting to the emergency department for evaluation of heroin withdrawal   Patient will be treated symptomatically with Zofran, Toradol, and clonidine  Patient was evaluated for abdominal pain with lab work, urine, urine pregnancy  Initial plan was to initiate IV to give IV medicines and IV fluids, however the patient was unable to tolerate IV attempts      Labs Reviewed   CBC AND DIFFERENTIAL - Abnormal       Result Value Ref Range Status    WBC 9 74  4 31 - 10 16 Thousand/uL Final    RBC 5 31 (*) 3 81 - 5 12 Million/uL Final    Hemoglobin 15 5 (*) 11 5 - 15 4 g/dL Final    Hematocrit 45 8  34 8 - 46 1 % Final    MCV 86  82 - 98 fL Final    MCH 29 2  26 8 - 34 3 pg Final    MCHC 33 8  31 4 - 37 4 g/dL Final    RDW 11 9  11 6 - 15 1 % Final    MPV 10 7  8 9 - 12 7 fL Final    Platelets 380  817 - 390 Thousands/uL Final    nRBC 0  /100 WBCs Final    Neutrophils Relative 74  43 - 75 % Final    Immat GRANS % 0  0 - 2 % Final    Lymphocytes Relative 21  14 - 44 % Final    Monocytes Relative 4  4 - 12 % Final    Eosinophils Relative 1  0 - 6 % Final    Basophils Relative 0  0 - 1 % Final    Neutrophils Absolute 7 13  1 85 - 7 62 Thousands/µL Final    Immature Grans Absolute 0 02  0 00 - 0 20 Thousand/uL Final    Lymphocytes Absolute 2 01  0 60 - 4 47 Thousands/µL Final    Monocytes Absolute 0 43  0 17 - 1 22 Thousand/µL Final    Eosinophils Absolute 0 11  0 00 - 0 61 Thousand/µL Final    Basophils Absolute 0 04  0 00 - 0 10 Thousands/µL Final   COMPREHENSIVE METABOLIC PANEL - Abnormal    Sodium 137  136 - 145 mmol/L Final    Potassium 4 1  3 5 - 5 3 mmol/L Final    Comment: Slightly Hemolyzed; Results May be Affected    Chloride 107  100 - 108 mmol/L Final    CO2 24  21 - 32 mmol/L Final    ANION GAP 6  4 - 13 mmol/L Final    BUN 14  5 - 25 mg/dL Final    Creatinine 0 71  0 60 - 1 30 mg/dL Final    Comment: Standardized to IDMS reference method    Glucose 92  65 - 140 mg/dL Final    Comment:   If the patient is fasting, the ADA then defines impaired fasting glucose as > 100 mg/dL and diabetes as > or equal to 123 mg/dL  Specimen collection should occur prior to Sulfasalazine administration due to the potential for falsely depressed results  Specimen collection should occur prior to Sulfapyridine administration due to the potential for falsely elevated results  Calcium 9 2  8 3 - 10 1 mg/dL Final    AST 29  5 - 45 U/L Final    Comment: Slightly Hemolyzed; Results May be Affected  Specimen collection should occur prior to Sulfasalazine administration due to the potential for falsely depressed results  ALT 22  12 - 78 U/L Final    Comment:   Specimen collection should occur prior to Sulfasalazine and/or Sulfapyridine administration due to the potential for falsely depressed results       Alkaline Phosphatase 81  46 - 116 U/L Final    Total Protein 7 8  6 4 - 8 2 g/dL Final    Albumin 3 8  3 5 - 5 0 g/dL Final    Total Bilirubin 1 05 (*) 0 20 - 1 00 mg/dL Final    eGFR 116  ml/min/1 73sq m Final    Narrative:     [place] guidelines for Chronic Kidney Disease (CKD):     Stage 1 with normal or high GFR (GFR > 90 mL/min/1 73 square meters)    Stage 2 Mild CKD (GFR = 60-89 mL/min/1 73 square meters)    Stage 3A Moderate CKD (GFR = 45-59 mL/min/1 73 square meters)    Stage 3B Moderate CKD (GFR = 30-44 mL/min/1 73 square meters)    Stage 4 Severe CKD (GFR = 15-29 mL/min/1 73 square meters)    Stage 5 End Stage CKD (GFR <15 mL/min/1 73 square meters)  Note: GFR calculation is accurate only with a steady state creatinine   LIPASE - Normal    Lipase 114  73 - 393 u/L Final   POCT ALCOHOL BREATH TEST - Normal    EXTBreath Alcohol 0 00   Final   RAPID DRUG SCREEN, URINE   POCT PREGNANCY, URINE   POCT URINALYSIS DIPSTICK

## 2019-07-08 NOTE — ED PROVIDER NOTES
History  Chief Complaint   Patient presents with    Drug Problem     Pt presents with chills, nausea, vomiting, and generalized body aches for two days after quitting nasal heroin use two days ago  31 yo F presents to ED for heroin withdrawal, desires rehab/detox  Pt says she has been using heroin 1 bag tid intranasal for about a year  She desires to quit because of her 5 yr old daughter  Pt denies being on any medication for withdrawal before because she usually just uses heroin when she starts to feel withdrawal symptoms  Denies any other illicit drug use  Iker any alcohol use  Denies any PMH  Does not take any medications  Denies any recent trauma/injuries/falls  Denies any SI/HI  Denies depression  Symptoms began yesterday  Last heroin use was 2 days ago  Symptoms include chills, sweating, abdominal cramps, diffuse body aches, nausea/vomiting, diarrhea  Prior to Admission Medications   Prescriptions Last Dose Informant Patient Reported? Taking? albuterol (PROAIR HFA) 90 mcg/act inhaler Unknown at Unknown time  No No   Sig: Inhale 2 puffs every 6 (six) hours as needed for wheezing   cetirizine (ZyrTEC) 5 MG tablet Unknown at Unknown time  No No   Sig: Take 1 tablet (5 mg total) by mouth daily   fluticasone (FLOVENT HFA) 44 mcg/act inhaler Unknown at Unknown time  No No   Sig: Inhale 2 puffs 2 (two) times a day Rinse mouth after use  Facility-Administered Medications: None       Past Medical History:   Diagnosis Date    Heroin abuse ([facility] [address] )        History reviewed  No pertinent surgical history  Family History   Problem Relation Age of Onset    Throat cancer Maternal Grandfather     Asthma Family      I have reviewed and agree with the history as documented      Social History     Tobacco Use    Smoking status: Current Every Day Smoker     Packs/day: 0 25     Years: 10 00     Pack years: 2 50     Types: Cigarettes    Smokeless tobacco: Never Used   Substance Use Topics    Alcohol use: No    Drug use: Yes     Types: Marijuana, Heroin     Comment: insufflation of heroin         Review of Systems   Constitutional: Positive for chills, diaphoresis and fatigue  HENT: Negative for congestion, sore throat and trouble swallowing  Eyes: Negative for pain, discharge and visual disturbance  Respiratory: Negative for cough, chest tightness and shortness of breath  Cardiovascular: Negative for chest pain, palpitations and leg swelling  Gastrointestinal: Positive for abdominal pain, diarrhea, nausea and vomiting  Genitourinary: Negative for dysuria, frequency and urgency  Musculoskeletal: Negative for gait problem, neck pain and neck stiffness  Skin: Positive for rash (has rash to bilateral legs (excoriated papules), non pruritic  thinks she got it from previous place of residence)  Negative for pallor and wound  Neurological: Negative for dizziness, syncope, light-headedness and headaches  Physical Exam  ED Triage Vitals   Temperature Pulse Respirations Blood Pressure SpO2   07/08/19 0726 07/08/19 0726 07/08/19 0726 07/08/19 0726 07/08/19 0726   98 7 °F (37 1 °C) 89 20 125/74 99 %      Temp src Heart Rate Source Patient Position - Orthostatic VS BP Location FiO2 (%)   -- 07/08/19 1318 07/08/19 1318 07/08/19 1318 --    Monitor Lying Right arm       Pain Score       07/08/19 0726       9             Orthostatic Vital Signs  Vitals:    07/08/19 0726 07/08/19 1318   BP: 125/74 115/54   Pulse: 89 78   Patient Position - Orthostatic VS:  Lying       Physical Exam   Constitutional: She is oriented to person, place, and time  She appears well-developed and well-nourished  Appears uncomfortable, curled up under multiple layers of blankets   HENT:   Head: Normocephalic and atraumatic  Mouth/Throat: Oropharynx is clear and moist    Eyes: Pupils are equal, round, and reactive to light  Conjunctivae and EOM are normal  Right eye exhibits no discharge  Left eye exhibits no discharge  Neck: Normal range of motion  Neck supple  nontender   Cardiovascular: Normal rate, regular rhythm and intact distal pulses  Pulmonary/Chest: Effort normal and breath sounds normal  No respiratory distress  She exhibits no tenderness  Abdominal: Soft  There is tenderness (diffuse)  There is no rebound and no guarding  Musculoskeletal: Normal range of motion  She exhibits no edema, tenderness or deformity  Neurological: She is alert and oriented to person, place, and time  No cranial nerve deficit or sensory deficit  She exhibits normal muscle tone  Coordination normal    Skin: Skin is warm  Capillary refill takes less than 2 seconds  Rash noted  She is diaphoretic  Nursing note and vitals reviewed        ED Medications  Medications   cloNIDine (CATAPRES) tablet 0 1 mg (0 1 mg Oral Given 7/8/19 0811)   ibuprofen (MOTRIN) tablet 600 mg (600 mg Oral Given 7/8/19 0811)   ondansetron (ZOFRAN-ODT) dispersible tablet 4 mg (4 mg Oral Given 7/8/19 0811)   loperamide (IMODIUM) capsule 2 mg (2 mg Oral Given 7/8/19 1435)       Diagnostic Studies  Results Reviewed     Procedure Component Value Units Date/Time    POCT alcohol breath test [[id number]]  (Normal) Resulted:  07/08/19 0946    Lab Status:  Final result Updated:  07/08/19 0946     EXTBreath Alcohol 0 00    Comprehensive metabolic panel [[id number]]  (Abnormal) Collected:  07/08/19 0803    Lab Status:  Final result Specimen:  Blood from Arm, Right Updated:  07/08/19 0846     Sodium 137 mmol/L      Potassium 4 1 mmol/L      Chloride 107 mmol/L      CO2 24 mmol/L      ANION GAP 6 mmol/L      BUN 14 mg/dL      Creatinine 0 71 mg/dL      Glucose 92 mg/dL      Calcium 9 2 mg/dL      AST 29 U/L      ALT 22 U/L      Alkaline Phosphatase 81 U/L      Total Protein 7 8 g/dL      Albumin 3 8 g/dL      Total Bilirubin 1 05 mg/dL      eGFR 116 ml/min/1 73sq m     Narrative:       [place] guidelines for Chronic Kidney Disease (CKD):     Stage 1 with normal or high GFR (GFR > 90 mL/min/1 73 square meters)    Stage 2 Mild CKD (GFR = 60-89 mL/min/1 73 square meters)    Stage 3A Moderate CKD (GFR = 45-59 mL/min/1 73 square meters)    Stage 3B Moderate CKD (GFR = 30-44 mL/min/1 73 square meters)    Stage 4 Severe CKD (GFR = 15-29 mL/min/1 73 square meters)    Stage 5 End Stage CKD (GFR <15 mL/min/1 73 square meters)  Note: GFR calculation is accurate only with a steady state creatinine    Lipase [[id number]]  (Normal) Collected:  07/08/19 0803    Lab Status:  Final result Specimen:  Blood from Arm, Right Updated:  07/08/19 0846     Lipase 114 u/L     CBC and differential [[id number]]  (Abnormal) Collected:  07/08/19 0803    Lab Status:  Final result Specimen:  Blood from Arm, Right Updated:  07/08/19 0813     WBC 9 74 Thousand/uL      RBC 5 31 Million/uL      Hemoglobin 15 5 g/dL      Hematocrit 45 8 %      MCV 86 fL      MCH 29 2 pg      MCHC 33 8 g/dL      RDW 11 9 %      MPV 10 7 fL      Platelets 932 Thousands/uL      nRBC 0 /100 WBCs      Neutrophils Relative 74 %      Immat GRANS % 0 %      Lymphocytes Relative 21 %      Monocytes Relative 4 %      Eosinophils Relative 1 %      Basophils Relative 0 %      Neutrophils Absolute 7 13 Thousands/µL      Immature Grans Absolute 0 02 Thousand/uL      Lymphocytes Absolute 2 01 Thousands/µL      Monocytes Absolute 0 43 Thousand/µL      Eosinophils Absolute 0 11 Thousand/µL      Basophils Absolute 0 04 Thousands/µL     POCT pregnancy, urine [[id number]]     Lab Status:  No result     POCT urinalysis dipstick [[id number]]     Lab Status:  No result Specimen:  Urine     Rapid drug screen, urine [[id number]]     Lab Status:  No result Specimen:  Urine                  No orders to display         Procedures  Procedures        ED Course  ED Course as of Jul 08 1504   Mon Jul 08, 2019   0806 RN unable to obtain IV on first attempt  Pt refusing any further attempts                                    MDM  Number of Diagnoses or Management Options  Heroin withdrawal [facility]):   Diagnosis management comments: Feeling better after clonidine, zofran  Imodium for diarrhea  Pt evaluated by host, has bed available at [facility] on Wednesday  Will discharge with rx for clonidine and zofran  Return precautions discussed  Disposition  Final diagnoses:   Heroin withdrawal ([address] )     Time reflects when diagnosis was documented in both MDM as applicable and the Disposition within this note     Time User Action Codes Description Comment    7/8/2019  2:31 PM Narcisa Brown Add [F11 23] Heroin withdrawal [facility])       ED Disposition     ED Disposition Condition Date/Time Comment    Discharge Stable Mon Jul 8, 0564  8:42 PM Rebecca Grossman discharge to home/self care  Follow-up Information     Follow up With Specialties Details Why Contact Info Additional [address] Emergency Department Emergency Medicine  As needed, If symptoms worsen [address]  [phone]  ED, [address] [place] [address], [phone]    [facility]  Go on 7/10/2019 drug rehab            Patient's Medications   Discharge Prescriptions    CLONIDINE (CATAPRES) 0 2 MG TABLET    Take 0 5 tablets (0 1 mg total) by mouth 3 (three) times a day as needed for high blood pressure (heroin withdrawal)       Start Date: 7/8/2019  End Date: --       Order Dose: 0 1 mg       Quantity: 3 tablet    Refills: 0    ONDANSETRON (ZOFRAN-ODT) 4 MG DISINTEGRATING TABLET    Take 1 tablet (4 mg total) by mouth every 8 (eight) hours as needed for nausea or vomiting       Start Date: 7/8/2019  End Date: --       Order Dose: 4 mg       Quantity: 9 tablet    Refills: 0     No discharge procedures on file  ED Provider  Attending physically available and evaluated Rebecca Grossman I managed the patient along with the ED Attending      Electronically Signed by         Luis F Krause MD  07/08/19 8086

## 2019-07-18 ENCOUNTER — APPOINTMENT (EMERGENCY)
Dept: RADIOLOGY | Facility: HOSPITAL | Age: 29
End: 2019-07-18
Payer: COMMERCIAL

## 2019-07-18 ENCOUNTER — HOSPITAL ENCOUNTER (EMERGENCY)
Facility: HOSPITAL | Age: 29
Discharge: HOME | End: 2019-07-18
Attending: EMERGENCY MEDICINE | Admitting: EMERGENCY MEDICINE
Payer: COMMERCIAL

## 2019-07-18 VITALS
SYSTOLIC BLOOD PRESSURE: 104 MMHG | HEART RATE: 72 BPM | DIASTOLIC BLOOD PRESSURE: 55 MMHG | OXYGEN SATURATION: 95 % | TEMPERATURE: 97.7 F | RESPIRATION RATE: 16 BRPM

## 2019-07-18 DIAGNOSIS — J45.901 ASTHMA EXACERBATION: Primary | ICD-10-CM

## 2019-07-18 LAB
ALBUMIN SERPL BCP-MCNC: 3.6 G/DL (ref 3.5–5)
ALP SERPL-CCNC: 106 U/L (ref 46–116)
ALT SERPL W P-5'-P-CCNC: 21 U/L (ref 12–78)
ANION GAP SERPL CALCULATED.3IONS-SCNC: 4 MMOL/L (ref 4–13)
AST SERPL W P-5'-P-CCNC: 15 U/L (ref 5–45)
ATRIAL RATE: 58 BPM
BACTERIA UR QL AUTO: ABNORMAL /HPF
BASOPHILS # BLD AUTO: 0.07 THOUSANDS/ΜL (ref 0–0.1)
BASOPHILS NFR BLD AUTO: 1 % (ref 0–1)
BILIRUB SERPL-MCNC: 0.22 MG/DL (ref 0.2–1)
BILIRUB UR QL STRIP: NEGATIVE
BUN SERPL-MCNC: 7 MG/DL (ref 5–25)
CALCIUM SERPL-MCNC: 9.1 MG/DL (ref 8.3–10.1)
CHLORIDE SERPL-SCNC: 106 MMOL/L (ref 100–108)
CLARITY UR: CLEAR
CO2 SERPL-SCNC: 33 MMOL/L (ref 21–32)
COLOR UR: YELLOW
CREAT SERPL-MCNC: 0.72 MG/DL (ref 0.6–1.3)
EOSINOPHIL # BLD AUTO: 0.69 THOUSAND/ΜL (ref 0–0.61)
EOSINOPHIL NFR BLD AUTO: 8 % (ref 0–6)
ERYTHROCYTE [DISTWIDTH] IN BLOOD BY AUTOMATED COUNT: 11.9 % (ref 11.6–15.1)
GFR SERPL CREATININE-BSD FRML MDRD: 114 ML/MIN/1.73SQ M
GLUCOSE SERPL-MCNC: 103 MG/DL (ref 65–140)
GLUCOSE UR STRIP-MCNC: NEGATIVE MG/DL
HCT VFR BLD AUTO: 43.3 % (ref 34.8–46.1)
HGB BLD-MCNC: 13.9 G/DL (ref 11.5–15.4)
HGB UR QL STRIP.AUTO: NEGATIVE
HYALINE CASTS #/AREA URNS LPF: ABNORMAL /LPF
IMM GRANULOCYTES # BLD AUTO: 0.03 THOUSAND/UL (ref 0–0.2)
IMM GRANULOCYTES NFR BLD AUTO: 0 % (ref 0–2)
KETONES UR STRIP-MCNC: NEGATIVE MG/DL
LEUKOCYTE ESTERASE UR QL STRIP: ABNORMAL
LYMPHOCYTES # BLD AUTO: 3.8 THOUSANDS/ΜL (ref 0.6–4.47)
LYMPHOCYTES NFR BLD AUTO: 42 % (ref 14–44)
MCH RBC QN AUTO: 28.8 PG (ref 26.8–34.3)
MCHC RBC AUTO-ENTMCNC: 32.1 G/DL (ref 31.4–37.4)
MCV RBC AUTO: 90 FL (ref 82–98)
MONOCYTES # BLD AUTO: 0.46 THOUSAND/ΜL (ref 0.17–1.22)
MONOCYTES NFR BLD AUTO: 5 % (ref 4–12)
NEUTROPHILS # BLD AUTO: 4.01 THOUSANDS/ΜL (ref 1.85–7.62)
NEUTS SEG NFR BLD AUTO: 44 % (ref 43–75)
NITRITE UR QL STRIP: NEGATIVE
NON-SQ EPI CELLS URNS QL MICRO: ABNORMAL /HPF
NRBC BLD AUTO-RTO: 0 /100 WBCS
NT-PROBNP SERPL-MCNC: 151 PG/ML
P AXIS: 73 DEGREES
PH UR STRIP.AUTO: 6 [PH] (ref 4.5–8)
PLATELET # BLD AUTO: 293 THOUSANDS/UL (ref 149–390)
PMV BLD AUTO: 10.4 FL (ref 8.9–12.7)
POTASSIUM SERPL-SCNC: 3.4 MMOL/L (ref 3.5–5.3)
PR INTERVAL: 176 MS
PROT SERPL-MCNC: 7.3 G/DL (ref 6.4–8.2)
PROT UR STRIP-MCNC: NEGATIVE MG/DL
QRS AXIS: 57 DEGREES
QRSD INTERVAL: 80 MS
QT INTERVAL: 402 MS
QTC INTERVAL: 394 MS
RBC # BLD AUTO: 4.82 MILLION/UL (ref 3.81–5.12)
RBC #/AREA URNS AUTO: ABNORMAL /HPF
SODIUM SERPL-SCNC: 143 MMOL/L (ref 136–145)
SP GR UR STRIP.AUTO: 1.02 (ref 1–1.03)
T WAVE AXIS: 55 DEGREES
TROPONIN I SERPL-MCNC: <0.02 NG/ML
UROBILINOGEN UR QL STRIP.AUTO: 0.2 E.U./DL
VENTRICULAR RATE: 58 BPM
WBC # BLD AUTO: 9.06 THOUSAND/UL (ref 4.31–10.16)
WBC #/AREA URNS AUTO: ABNORMAL /HPF

## 2019-07-18 PROCEDURE — 93005 ELECTROCARDIOGRAM TRACING: CPT

## 2019-07-18 PROCEDURE — 84484 ASSAY OF TROPONIN QUANT: CPT | Performed by: EMERGENCY MEDICINE

## 2019-07-18 PROCEDURE — 99285 EMERGENCY DEPT VISIT HI MDM: CPT

## 2019-07-18 PROCEDURE — 99283 EMERGENCY DEPT VISIT LOW MDM: CPT | Performed by: EMERGENCY MEDICINE

## 2019-07-18 PROCEDURE — 80053 COMPREHEN METABOLIC PANEL: CPT | Performed by: EMERGENCY MEDICINE

## 2019-07-18 PROCEDURE — 93010 ELECTROCARDIOGRAM REPORT: CPT | Performed by: INTERNAL MEDICINE

## 2019-07-18 PROCEDURE — 71046 X-RAY EXAM CHEST 2 VIEWS: CPT

## 2019-07-18 PROCEDURE — 87086 URINE CULTURE/COLONY COUNT: CPT

## 2019-07-18 PROCEDURE — 36415 COLL VENOUS BLD VENIPUNCTURE: CPT

## 2019-07-18 PROCEDURE — 83880 ASSAY OF NATRIURETIC PEPTIDE: CPT | Performed by: EMERGENCY MEDICINE

## 2019-07-18 PROCEDURE — 94640 AIRWAY INHALATION TREATMENT: CPT

## 2019-07-18 PROCEDURE — 81001 URINALYSIS AUTO W/SCOPE: CPT

## 2019-07-18 PROCEDURE — 85025 COMPLETE CBC W/AUTO DIFF WBC: CPT | Performed by: EMERGENCY MEDICINE

## 2019-07-18 RX ORDER — PREDNISONE 20 MG/1
40 TABLET ORAL DAILY
Qty: 10 TABLET | Refills: 0 | Status: SHIPPED | OUTPATIENT
Start: 2019-07-18 | End: 2021-01-03 | Stop reason: SDUPTHER

## 2019-07-18 RX ORDER — IPRATROPIUM BROMIDE AND ALBUTEROL SULFATE 2.5; .5 MG/3ML; MG/3ML
3 SOLUTION RESPIRATORY (INHALATION) ONCE
Status: COMPLETED | OUTPATIENT
Start: 2019-07-18 | End: 2019-07-18

## 2019-07-18 RX ORDER — ALBUTEROL SULFATE 2.5 MG/3ML
2.5 SOLUTION RESPIRATORY (INHALATION) EVERY 6 HOURS PRN
Qty: 60 VIAL | Refills: 0 | Status: SHIPPED | OUTPATIENT
Start: 2019-07-18 | End: 2021-01-03 | Stop reason: SDUPTHER

## 2019-07-18 RX ORDER — PREDNISONE 20 MG/1
40 TABLET ORAL ONCE
Status: COMPLETED | OUTPATIENT
Start: 2019-07-18 | End: 2019-07-18

## 2019-07-18 RX ADMIN — PREDNISONE 40 MG: 20 TABLET ORAL at 05:59

## 2019-07-18 RX ADMIN — IPRATROPIUM BROMIDE AND ALBUTEROL SULFATE 3 ML: 2.5; .5 SOLUTION RESPIRATORY (INHALATION) at 05:58

## 2019-07-18 NOTE — ED PROVIDER NOTES
ASSESSMENT AND PLAN    Chai Silva is a 30 y o  female with a history of IV drug use, asthma who presents for evaluation of dyspnea, wheezing, concerning for acute asthma exacerbation  On arrival, the patient is hemodynamically stable and well-appearing without acute distress, with a nontoxic appearance  On exam , the patient does display end expiratory wheezing in all lung fields, but has no respiratory distress  She has bilateral nonpitting edema, without any acute appearing skin changes   The physical exam is otherwise unremarkable   -lab work was checked to evaluate for possible heart failure, but was largely unremarkable  Urinalysis also unremarkable  -chest x-ray not consistent with pulmonary edema, or infiltrate  -the patient was given a DuoNeb, as well as 40 of prednisone, and reported significant relief of her symptoms  She states that her symptoms had already been relieved when EMS gave her a DuoNeb  -symptoms consistent with asthma exacerbation  Will provide the patient a nebulizer, as well as 5 day course of prednisone  Unclear etiology of the patient's leg edema, most likely dependent edema  Recommended compression stockings  Recommended follow up with her primary care physician  - Will discharge home, strict return precautions provided  History  Chief Complaint   Patient presents with    Chest Pain     Patient reports chest tightness and SOB that started around 0200  Per EMS, pt given albuterol, which patient states helped relieved chest tightness  Pt also reports leg swelling x4 days   Leg Swelling     This is a 21-year-old history with history of asthma, heroin abuse  Patient presents with shortness of breath  The patient did initially tell the nurse that she had chest pain, however on further questioning, the patient states that she never had chest pain, and instead was referring to her shortness of breath  She states that this started at approximately 2:00 a m  Cortes Alberto   She states she feels similar to her prior asthma exacerbations  She called EMS, was brought to the hospital, and given a breathing treatment, which the patient states provided her significant relief  The patient does note that she does not have a nebulizer currently, but has used 1 in the past   She states that the home she is currently staying at confiscated her nebulizer for unknown reasons  The patient also endorses bilateral lower extremity swelling, which have been ongoing for the last several months, waxing and waning throughout the day, and generally worse after a prolonged amount of standing  While the patient does have a history of heroin use, she states that she snorts the drug, and has never used IV drugs in her life  She denies any recent drug use  She denies any fevers, chills, recent febrile illnesses, chest pain, palpitations, nausea, vomiting, diarrhea, abdominal pain, or urinary symptoms  She has no recent travel, recent surgeries, recent hospitalizations, or recent sick contacts  Prior to Admission Medications   Prescriptions Last Dose Informant Patient Reported? Taking? albuterol (PROAIR HFA) 90 mcg/act inhaler   No No   Sig: Inhale 2 puffs every 6 (six) hours as needed for wheezing   cetirizine (ZyrTEC) 5 MG tablet   No No   Sig: Take 1 tablet (5 mg total) by mouth daily   cloNIDine (CATAPRES) 0 2 mg tablet   No No   Sig: Take 0 5 tablets (0 1 mg total) by mouth 3 (three) times a day as needed for high blood pressure (heroin withdrawal)   fluticasone (FLOVENT HFA) 44 mcg/act inhaler   No No   Sig: Inhale 2 puffs 2 (two) times a day Rinse mouth after use  ondansetron (ZOFRAN-ODT) 4 mg disintegrating tablet   No No   Sig: Take 1 tablet (4 mg total) by mouth every 8 (eight) hours as needed for nausea or vomiting      Facility-Administered Medications: None       Past Medical History:   Diagnosis Date    Heroin abuse ([facility] [address] )        History reviewed  No pertinent surgical history      Family History   Problem Relation Age of Onset    Throat cancer Maternal Grandfather     Asthma Family      I have reviewed and agree with the history as documented  Social History     Tobacco Use    Smoking status: Current Every Day Smoker     Packs/day: 0 25     Years: 10 00     Pack years: 2 50     Types: Cigarettes    Smokeless tobacco: Never Used   Substance Use Topics    Alcohol use: No    Drug use: Yes     Types: Marijuana, Heroin     Comment: insufflation of heroin         Review of Systems   Constitutional: Negative for chills and fever  HENT: Negative for congestion and rhinorrhea  Eyes: Negative for photophobia and visual disturbance  Respiratory: Positive for cough, shortness of breath and wheezing  Cardiovascular: Negative for chest pain and palpitations  Gastrointestinal: Negative for abdominal pain, diarrhea, nausea and vomiting  Genitourinary: Negative for dysuria and frequency  Musculoskeletal: Negative for neck pain and neck stiffness  Skin: Negative for pallor and rash  Neurological: Negative for light-headedness and headaches  All other systems reviewed and are negative  Physical Exam  ED Triage Vitals [07/18/19 0454]   Temperature Pulse Respirations Blood Pressure SpO2   97 7 °F (36 5 °C) 68 20 (!) 106/47 99 %      Temp Source Heart Rate Source Patient Position - Orthostatic VS BP Location FiO2 (%)   Oral Monitor Lying Right arm --      Pain Score       7             Orthostatic Vital Signs  Vitals:    07/18/19 0454 07/18/19 0600 07/18/19 0630   BP: (!) 106/47 98/53 104/55   Pulse: 68 66 72   Patient Position - Orthostatic VS: Lying Lying Lying       Physical Exam   Constitutional: She is oriented to person, place, and time  Awake and alert, well appearing, no acute distress  Nontoxic in appearance  Mental status appropriate  HENT:   Head: Normocephalic and atraumatic  Mouth/Throat: Oropharynx is clear and moist  No oropharyngeal exudate     Eyes: Pupils are equal, round, and reactive to light  EOM are normal  Right eye exhibits no discharge  Left eye exhibits no discharge  No scleral icterus  Neck: Normal range of motion  Neck supple  No JVD present  No tracheal deviation present  Cardiovascular: Normal rate, regular rhythm and normal heart sounds  No murmur heard  Pulmonary/Chest:   There is faint end expiratory wheezing in all lung fields, without rales or rhonchi  No respiratory distress  No accessory muscle use  Abdominal: Soft  She exhibits no distension and no mass  There is no tenderness  Musculoskeletal: Normal range of motion  She exhibits no edema or deformity  Bilateral nonpitting ankle edema  Numerous, faint, old excoriations, without active skin violation  No other skin changes  No erythema  No signs of infection  Neurological: She is alert and oriented to person, place, and time  No cranial nerve deficit  She exhibits normal muscle tone  Skin: Skin is warm and dry  No rash noted  No pallor  ED Medications  Medications   ipratropium-albuterol (DUO-NEB) 0 5-2 5 mg/3 mL inhalation solution 3 mL (3 mL Nebulization Given 7/18/19 0558)   predniSONE tablet 40 mg (40 mg Oral Given 7/18/19 0559)       Diagnostic Studies  Results Reviewed     Procedure Component Value Units Date/Time    B-type natriuretic peptide [[id number]]  (Abnormal) Collected:  07/18/19 0506    Lab Status:  Final result Specimen:  Blood from Arm, Left Updated:  07/18/19 0659     NT-proBNP 151 pg/mL     Urine Microscopic [[id number]]  (Abnormal) Collected:  07/18/19 0608    Lab Status:  Final result Specimen:  Urine, Clean Catch Updated:  07/18/19 0642     RBC, UA None Seen /hpf      WBC, UA 10-20 /hpf      Epithelial Cells None Seen /hpf      Bacteria, UA Occasional /hpf      Hyaline Casts, UA 3-5 /lpf     Urine culture [[id number]] Collected:  07/18/19 0507    Lab Status:   In process Specimen:  Urine, Clean Catch Updated:  07/18/19 1766    ED Urine Macroscopic [[id number]]  (Abnormal) Collected:  07/18/19 0608    Lab Status:  Final result Specimen:  Urine Updated:  07/18/19 0604     Color, UA Yellow     Clarity, UA Clear     pH, UA 6 0     Leukocytes, UA Small     Nitrite, UA Negative     Protein, UA Negative mg/dl      Glucose, UA Negative mg/dl      Ketones, UA Negative mg/dl      Urobilinogen, UA 0 2 E U /dl      Bilirubin, UA Negative     Blood, UA Negative     Specific Gravity, UA 1 025    Narrative:       CLINITEK RESULT    Comprehensive metabolic panel [[id number]]  (Abnormal) Collected:  07/18/19 0506    Lab Status:  Final result Specimen:  Blood from Arm, Left Updated:  07/18/19 0533     Sodium 143 mmol/L      Potassium 3 4 mmol/L      Chloride 106 mmol/L      CO2 33 mmol/L      ANION GAP 4 mmol/L      BUN 7 mg/dL      Creatinine 0 72 mg/dL      Glucose 103 mg/dL      Calcium 9 1 mg/dL      AST 15 U/L      ALT 21 U/L      Alkaline Phosphatase 106 U/L      Total Protein 7 3 g/dL      Albumin 3 6 g/dL      Total Bilirubin 0 22 mg/dL      eGFR 114 ml/min/1 73sq m     Narrative:       [facility] guidelines for Chronic Kidney Disease (CKD):     Stage 1 with normal or high GFR (GFR > 90 mL/min/1 73 square meters)    Stage 2 Mild CKD (GFR = 60-89 mL/min/1 73 square meters)    Stage 3A Moderate CKD (GFR = 45-59 mL/min/1 73 square meters)    Stage 3B Moderate CKD (GFR = 30-44 mL/min/1 73 square meters)    Stage 4 Severe CKD (GFR = 15-29 mL/min/1 73 square meters)    Stage 5 End Stage CKD (GFR <15 mL/min/1 73 square meters)  Note: GFR calculation is accurate only with a steady state creatinine    Troponin I [[id number]]  (Normal) Collected:  07/18/19 0506    Lab Status:  Final result Specimen:  Blood from Arm, Left Updated:  07/18/19 0533     Troponin I <0 02 ng/mL     CBC and differential [[id number]]  (Abnormal) Collected:  07/18/19 0506    Lab Status:  Final result Specimen:  Blood from Arm, Left Updated:  07/18/19 0515     WBC 9 06 Thousand/uL RBC 4 82 Million/uL      Hemoglobin 13 9 g/dL      Hematocrit 43 3 %      MCV 90 fL      MCH 28 8 pg      MCHC 32 1 g/dL      RDW 11 9 %      MPV 10 4 fL      Platelets 954 Thousands/uL      nRBC 0 /100 WBCs      Neutrophils Relative 44 %      Immat GRANS % 0 %      Lymphocytes Relative 42 %      Monocytes Relative 5 %      Eosinophils Relative 8 %      Basophils Relative 1 %      Neutrophils Absolute 4 01 Thousands/µL      Immature Grans Absolute 0 03 Thousand/uL      Lymphocytes Absolute 3 80 Thousands/µL      Monocytes Absolute 0 46 Thousand/µL      Eosinophils Absolute 0 69 Thousand/µL      Basophils Absolute 0 07 Thousands/µL                  XR chest 2 views    (Results Pending)         Procedures  Procedures        ED Course                               MDM    Disposition  Final diagnoses:   Asthma exacerbation     Time reflects when diagnosis was documented in both MDM as applicable and the Disposition within this note     Time User Action Codes Description Comment    7/18/2019  7:02 AM Donna Jaramillo Add [J45 901] Asthma exacerbation       ED Disposition     ED Disposition Condition Date/Time Comment    Discharge Stable Thu Jul 18, 0797  6:78 AM Thom Mcdonough discharge to home/self care              Follow-up Information     Follow up With Specialties Details Why Contact Info    Margaux Joshi MD Family Medicine Call  For re-evaluation in 5 days [address] [place] [address]  [phone]            Discharge Medication List as of 7/18/2019  7:04 AM      START taking these medications    Details   albuterol (2 5 mg/3 mL) 0 083 % nebulizer solution Take 1 vial (2 5 mg total) by nebulization every 6 (six) hours as needed for wheezing or shortness of breath, Starting Thu 7/18/2019, Print      predniSONE 20 mg tablet Take 2 tablets (40 mg total) by mouth daily, Starting Thu 7/18/2019, Print         CONTINUE these medications which have NOT CHANGED    Details   albuterol (PROAIR HFA) 90 mcg/act inhaler Inhale 2 puffs every 6 (six) hours as needed for wheezing, Starting Wed 6/26/2019, Print      cetirizine (ZyrTEC) 5 MG tablet Take 1 tablet (5 mg total) by mouth daily, Starting Wed 6/26/2019, Print      cloNIDine (CATAPRES) 0 2 mg tablet Take 0 5 tablets (0 1 mg total) by mouth 3 (three) times a day as needed for high blood pressure (heroin withdrawal), Starting Mon 7/8/2019, Print      fluticasone (FLOVENT HFA) 44 mcg/act inhaler Inhale 2 puffs 2 (two) times a day Rinse mouth after use , Starting Wed 6/26/2019, Print      ondansetron (ZOFRAN-ODT) 4 mg disintegrating tablet Take 1 tablet (4 mg total) by mouth every 8 (eight) hours as needed for nausea or vomiting, Starting Mon 7/8/2019, Print           No discharge procedures on file  ED Provider  Attending physically available and evaluated Evita Dwyer I managed the patient along with the ED Attending      Electronically Signed by         Juan Antonio Sheppard MD  07/18/19 5027

## 2019-07-18 NOTE — ED ATTENDING ATTESTATION
Marcella Jane MD, saw and evaluated the patient  I have discussed the patient with the resident/non-physician practitioner and agree with the resident's/non-physician practitioner's findings, Plan of Care, and MDM as documented in the resident's/non-physician practitioner's note, except where noted  All available labs and Radiology studies were reviewed  I was present for key portions of any procedure(s) performed by the resident/non-physician practitioner and I was immediately available to provide assistance  At this point I agree with the current assessment done in the Emergency Department  I have conducted an independent evaluation of this patient a history and physical is as follows:      Critical Care Time  Procedures     Patient is a 30year old female who presents to the ER with shortness of breath  She takes heroin  Had a neb by ems, feels better  Some swelling to bilateral ankles - unilateral swelling  History of asthma, she notes this feels similar to prior asthma flares  PE risk, Wells score = [0]   (0) clinically suspected DVT - 3 points   (0) alternative diagnosis is less likely than PE - 3 0 points   (0) tachycardia - 1 5 points   (0) immobilization/surgery in previous four weeks - 1 5 points   (0) history of DVT or PE - 1 5 points   (0) hemoptysis - 1 0 points   (0) malignancy (treatment for within 6 months, palliative) - 1 0 points   Interpretation Noemi Shelton et al  2007 Radiology [id number]):   Score >6 0 - High (probability 59% based on pooled data)   Score 2 0 to 6 0 - Moderate (probability 29% based on pooled data)   Score <2 0 - Low (probability 15% based on pooled data)     The patient is less than 50, has an initial HR < 100, O2 Saturation >95%, no hx of previous VTE, no recent trauma or surgery within 4 weeks, no hemoptylsis, and is not on any exogenous estrogen  The patient has no need for further workup of PE and has  <2% chance of PE   My initial pre-test probability of PE is <15% and PERC Rule criteria are satisfied

## 2019-07-19 LAB — BACTERIA UR CULT: NORMAL

## 2020-02-28 ENCOUNTER — HOSPITAL ENCOUNTER (EMERGENCY)
Facility: HOSPITAL | Age: 30
Discharge: HOME | End: 2020-02-28
Attending: EMERGENCY MEDICINE | Admitting: EMERGENCY MEDICINE
Payer: COMMERCIAL

## 2020-02-28 ENCOUNTER — TELEPHONE (OUTPATIENT)
Dept: EMERGENCY DEPT | Facility: HOSPITAL | Age: 30
End: 2020-02-28

## 2020-02-28 ENCOUNTER — APPOINTMENT (EMERGENCY)
Dept: RADIOLOGY | Facility: HOSPITAL | Age: 30
End: 2020-02-28
Payer: COMMERCIAL

## 2020-02-28 VITALS
BODY MASS INDEX: 22.1 KG/M2 | RESPIRATION RATE: 20 BRPM | TEMPERATURE: 97 F | HEART RATE: 88 BPM | DIASTOLIC BLOOD PRESSURE: 68 MMHG | SYSTOLIC BLOOD PRESSURE: 124 MMHG | WEIGHT: 141.09 LBS | OXYGEN SATURATION: 93 %

## 2020-02-28 DIAGNOSIS — J45.901 ASTHMA EXACERBATION: ICD-10-CM

## 2020-02-28 DIAGNOSIS — J18.9 PNEUMONIA: Primary | ICD-10-CM

## 2020-02-28 DIAGNOSIS — J98.01 BRONCHOSPASM: ICD-10-CM

## 2020-02-28 DIAGNOSIS — J06.9 VIRAL URI WITH COUGH: Primary | ICD-10-CM

## 2020-02-28 LAB
FLUAV RNA NPH QL NAA+PROBE: NORMAL
FLUBV RNA NPH QL NAA+PROBE: NORMAL
RSV RNA NPH QL NAA+PROBE: NORMAL

## 2020-02-28 PROCEDURE — 94640 AIRWAY INHALATION TREATMENT: CPT | Performed by: EMERGENCY MEDICINE

## 2020-02-28 PROCEDURE — 87631 RESP VIRUS 3-5 TARGETS: CPT | Performed by: EMERGENCY MEDICINE

## 2020-02-28 PROCEDURE — 99284 EMERGENCY DEPT VISIT MOD MDM: CPT

## 2020-02-28 PROCEDURE — 94640 AIRWAY INHALATION TREATMENT: CPT

## 2020-02-28 PROCEDURE — 99285 EMERGENCY DEPT VISIT HI MDM: CPT | Performed by: EMERGENCY MEDICINE

## 2020-02-28 PROCEDURE — 71046 X-RAY EXAM CHEST 2 VIEWS: CPT

## 2020-02-28 RX ORDER — DOXYCYCLINE HYCLATE 100 MG/1
100 CAPSULE ORAL 2 TIMES DAILY
Qty: 14 CAPSULE | Refills: 0 | Status: SHIPPED | OUTPATIENT
Start: 2020-02-28 | End: 2020-03-06

## 2020-02-28 RX ORDER — ALBUTEROL SULFATE 90 UG/1
2 AEROSOL, METERED RESPIRATORY (INHALATION) EVERY 6 HOURS PRN
Qty: 8.5 G | Refills: 0 | Status: SHIPPED | OUTPATIENT
Start: 2020-02-28 | End: 2020-02-28 | Stop reason: SDUPTHER

## 2020-02-28 RX ORDER — IPRATROPIUM BROMIDE AND ALBUTEROL SULFATE 2.5; .5 MG/3ML; MG/3ML
3 SOLUTION RESPIRATORY (INHALATION)
Status: DISCONTINUED | OUTPATIENT
Start: 2020-02-28 | End: 2020-02-28 | Stop reason: HOSPADM

## 2020-02-28 RX ORDER — DEXAMETHASONE 4 MG/1
12 TABLET ORAL ONCE
Qty: 3 TABLET | Refills: 0 | Status: SHIPPED | OUTPATIENT
Start: 2020-02-28 | End: 2020-02-28

## 2020-02-28 RX ORDER — DEXAMETHASONE 4 MG/1
12 TABLET ORAL ONCE
Qty: 3 TABLET | Refills: 0 | Status: SHIPPED | OUTPATIENT
Start: 2020-02-28 | End: 2020-02-28 | Stop reason: SDUPTHER

## 2020-02-28 RX ORDER — ALBUTEROL SULFATE 90 UG/1
2 AEROSOL, METERED RESPIRATORY (INHALATION) EVERY 6 HOURS PRN
Qty: 8.5 G | Refills: 0 | Status: SHIPPED | OUTPATIENT
Start: 2020-02-28 | End: 2021-01-03 | Stop reason: SDUPTHER

## 2020-02-28 RX ADMIN — IPRATROPIUM BROMIDE AND ALBUTEROL SULFATE 3 ML: .5; 3 SOLUTION RESPIRATORY (INHALATION) at 08:46

## 2020-02-28 RX ADMIN — DEXAMETHASONE SODIUM PHOSPHATE 10 MG: 10 INJECTION, SOLUTION INTRAMUSCULAR; INTRAVENOUS at 08:46

## 2020-02-28 NOTE — ED PROVIDER NOTES
History  Chief Complaint   Patient presents with    Cough     " for 4 days " + green " phlem "     30 yr old female,  Hx of asthma, active smoker and hx of IVDA  Presents for 4 days of cough, productive of green sputum  Subjective fevers  States she feels so bad, "I have smoked my pack of cigarrettes"  Unsure of sick contacts  Thinks she may have the flu  Prior to Admission Medications   Prescriptions Last Dose Informant Patient Reported? Taking? albuterol (2 5 mg/3 mL) 0 083 % nebulizer solution   No No   Sig: Take 1 vial (2 5 mg total) by nebulization every 6 (six) hours as needed for wheezing or shortness of breath   albuterol (PROAIR HFA) 90 mcg/act inhaler   No No   Sig: Inhale 2 puffs every 6 (six) hours as needed for wheezing   albuterol (ProAir HFA) 90 mcg/act inhaler   No No   Sig: Inhale 2 puffs every 6 (six) hours as needed for wheezing   cetirizine (ZyrTEC) 5 MG tablet   No No   Sig: Take 1 tablet (5 mg total) by mouth daily   cloNIDine (CATAPRES) 0 2 mg tablet   No No   Sig: Take 0 5 tablets (0 1 mg total) by mouth 3 (three) times a day as needed for high blood pressure (heroin withdrawal)   fluticasone (FLOVENT HFA) 44 mcg/act inhaler   No No   Sig: Inhale 2 puffs 2 (two) times a day Rinse mouth after use  ondansetron (ZOFRAN-ODT) 4 mg disintegrating tablet   No No   Sig: Take 1 tablet (4 mg total) by mouth every 8 (eight) hours as needed for nausea or vomiting   predniSONE 20 mg tablet   No No   Sig: Take 2 tablets (40 mg total) by mouth daily      Facility-Administered Medications: None       Past Medical History:   Diagnosis Date    Heroin abuse ([facility] [address] )        History reviewed  No pertinent surgical history  Family History   Problem Relation Age of Onset    Throat cancer Maternal Grandfather     Asthma Family      I have reviewed and agree with the history as documented      E-Cigarette/Vaping     E-Cigarette/Vaping Substances     Social History     Tobacco Use    Smoking status: Current Every Day Smoker     Packs/day: 0 25     Years: 10 00     Pack years: 2 50     Types: Cigarettes    Smokeless tobacco: Never Used   Substance Use Topics    Alcohol use: No    Drug use: Not Currently     Types: Marijuana, Heroin     Comment: insufflation of heroin        Review of Systems   Constitutional: Positive for fever  Negative for chills  HENT: Positive for congestion  Negative for rhinorrhea, sore throat, trouble swallowing and voice change  Eyes: Negative  Negative for pain and visual disturbance  Respiratory: Positive for cough, shortness of breath and wheezing  Cardiovascular: Negative  Negative for chest pain and palpitations  Gastrointestinal: Negative for abdominal pain, diarrhea, nausea and vomiting  Genitourinary: Negative  Negative for dysuria and frequency  Musculoskeletal: Negative  Negative for neck pain and neck stiffness  Skin: Negative  Negative for rash  Neurological: Negative  Negative for dizziness, speech difficulty, weakness, light-headedness and numbness  Physical Exam  Physical Exam   Constitutional: She is oriented to person, place, and time  She appears well-developed and well-nourished  No distress  HENT:   Head: Normocephalic and atraumatic  Mouth/Throat: Oropharynx is clear and moist    Eyes: Pupils are equal, round, and reactive to light  Conjunctivae and EOM are normal    Neck: Normal range of motion  Neck supple  No tracheal deviation present  Cardiovascular: Normal rate, regular rhythm and intact distal pulses  Pulmonary/Chest: Effort normal  No respiratory distress  She has decreased breath sounds in the left upper field and the left lower field  She has wheezes  She has rhonchi in the left upper field and the left lower field  She has no rales  Abdominal: Soft  Bowel sounds are normal  She exhibits no distension  There is no tenderness  There is no rebound and no guarding  Musculoskeletal: Normal range of motion   She exhibits no tenderness or deformity  Neurological: She is alert and oriented to person, place, and time  Skin: Skin is warm and dry  Capillary refill takes less than 2 seconds  No rash noted  Psychiatric: She has a normal mood and affect  Her behavior is normal    Nursing note and vitals reviewed  Vital Signs  ED Triage Vitals [02/28/20 0828]   Temperature Pulse Respirations Blood Pressure SpO2   (!) 97 °F (36 1 °C) 88 20 124/68 93 %      Temp Source Heart Rate Source Patient Position - Orthostatic VS BP Location FiO2 (%)   Tympanic Monitor Sitting Left arm --      Pain Score       --           Vitals:    02/28/20 0828   BP: 124/68   Pulse: 88   Patient Position - Orthostatic VS: Sitting         Visual Acuity      ED Medications  Medications   ipratropium-albuterol (DUO-NEB) 0 5-2 5 mg/3 mL inhalation solution 3 mL (3 mL Nebulization Given 2/28/20 0846)   dexamethasone 10 mg/mL oral liquid 10 mg 1 mL (10 mg Oral Given 2/28/20 0846)       Diagnostic Studies  Results Reviewed     Procedure Component Value Units Date/Time    Influenza A/B and RSV PCR [[id number]]  (Normal) Collected:  02/28/20 0845    Lab Status:  Final result Specimen:  Nares from Nose Updated:  02/28/20 0935     INFLUENZA A PCR None Detected     INFLUENZA B PCR None Detected     RSV PCR None Detected                 XR chest 2 views   ED Interpretation by Gloria Barrientos DO (02/28 1495)   Hyperinflated lungs, otherwise no acute cardiopulmonary disease appreciated  Procedures  Procedures         ED Course                               MDM  Number of Diagnoses or Management Options  Asthma exacerbation:   Viral URI with cough:   Diagnosis management comments: Patient is a 60-year-old female presenting for concerns of cough, productive green sputum and subjective fevers  Patient states she was concerned about influenza    Will obtain chest x-ray, provide symptomatic management as she is having wheezing and rhonchi on examination  Will hold antibiotics until patient can have x-ray to confirm or rule out pneumonia  Will obtain influenza testing as well  Patient otherwise well appearing, no acute tachypnea, no hypoxia, no accessory muscle use  0804:    Chest x-ray was negative for acute pathology per my interpretation, influenza testing was negative, patient feels better after steroids and DuoNebs and is requesting to go home  I have reviewed all the patient's pertinent blood work, imaging results and other testing and their evaluation here in the emergency room  They verbalized understanding of the testing today and have no further questions or concerns regarding the care here in the emergency room  He will follow up with her primary care physician as well as with any specialist in their discharge instructions  Strict return precautions were discussed  Amount and/or Complexity of Data Reviewed  Clinical lab tests: ordered and reviewed  Tests in the radiology section of CPT®: ordered and reviewed          Disposition  Final diagnoses:   Viral URI with cough   Asthma exacerbation     Time reflects when diagnosis was documented in both MDM as applicable and the Disposition within this note     Time User Action Codes Description Comment    2/28/2020  9:50 AM Eliseo Lopez Add [J06 9,  B97 89] Viral URI with cough     2/28/2020  9:50 AM Eliseo Lopez Add [J45 901] Asthma exacerbation     2/28/2020  9:51 AM Eliseo Loepz Add [J98 01] Bronchospasm       ED Disposition     ED Disposition Condition Date/Time Comment    Discharge Stable Fri Feb 28, 7990  5:73 AM Keri Corrales discharge to home/self care              Follow-up Information     Follow up With Specialties Details Why Contact Info    Krista Galan MD Family Medicine   [address] [place] [address] Drive  [phone]            Patient's Medications   Discharge Prescriptions    DEXAMETHASONE (DECADRON) 4 MG TABLET    Take 3 tablets (12 mg total) by mouth once for 1 dose       Start Date: 2/28/2020 End Date: 2/28/2020       Order Dose: 12 mg       Quantity: 3 tablet    Refills: 0     No discharge procedures on file      PDMP Review     None          ED Provider  Electronically Signed by           Janes Mart DO  02/28/20 7937

## 2020-09-01 ENCOUNTER — OFFICE VISIT (OUTPATIENT)
Dept: URGENT CARE | Age: 30
End: 2020-09-01

## 2020-09-01 VITALS
TEMPERATURE: 97 F | RESPIRATION RATE: 12 BRPM | DIASTOLIC BLOOD PRESSURE: 60 MMHG | OXYGEN SATURATION: 92 % | HEART RATE: 73 BPM | SYSTOLIC BLOOD PRESSURE: 100 MMHG

## 2020-09-01 DIAGNOSIS — J45.901 MODERATE ASTHMA WITH ACUTE EXACERBATION, UNSPECIFIED WHETHER PERSISTENT: Primary | ICD-10-CM

## 2020-09-01 PROCEDURE — 96372 THER/PROPH/DIAG INJ SC/IM: CPT | Performed by: NURSE PRACTITIONER

## 2020-09-01 PROCEDURE — 99213 OFFICE O/P EST LOW 20 MIN: CPT | Performed by: NURSE PRACTITIONER

## 2020-09-01 RX ORDER — METHYLPREDNISOLONE 4 MG/1
TABLET ORAL
Qty: 1 EACH | Refills: 0 | Status: SHIPPED | OUTPATIENT
Start: 2020-09-01 | End: 2021-01-03 | Stop reason: SDUPTHER

## 2020-09-01 RX ORDER — ALBUTEROL SULFATE 90 UG/1
2 AEROSOL, METERED RESPIRATORY (INHALATION) EVERY 6 HOURS PRN
Qty: 8.5 G | Refills: 1 | Status: SHIPPED | OUTPATIENT
Start: 2020-09-01 | End: 2021-01-03 | Stop reason: SDUPTHER

## 2020-09-01 RX ADMIN — Medication 8 MG: at 16:38

## 2020-09-01 NOTE — PROGRESS NOTES
[facility]        NAME: Carlitos Ordoñez is a 35 y o  female  : 1990    MRN: [id number]  DATE: 2020  TIME: 4:41 PM    Assessment and Plan   Moderate asthma with acute exacerbation, unspecified whether persistent [J45 901]  1  Moderate asthma with acute exacerbation, unspecified whether persistent  dexamethasone (DECADRON) injection 8 mg    albuterol (ProAir HFA) 90 mcg/act inhaler    methylPREDNISolone 4 MG tablet therapy pack         Patient Instructions     Take meds as directed  Decadron IM now; start dosepak in 2-4 hrs  Start albuterol inhaler ASAP  Follow up with PCP in 3-5 days  Proceed to  ER if symptoms worsen  Chief Complaint     Chief Complaint   Patient presents with    Shortness of Breath     ASTHMAS FLARE- UP FOR 2  DAYS  History of Present Illness       HPI   Presents to clinic with complaint of flare-up of her asthma x2 days  Had also mention to the nurse that she started having symptoms about 4 days ago  Current symptoms include wheezing and some shortness of breath  Oxygen saturation at the clinic is at 90-93%  Refuses to go to the emergency room  Last use of albuterol inhaler was 2 days ago  Records show she uses ProAir inhaler    Review of Systems   Review of Systems   Constitutional: Negative for chills and fever  Respiratory: Positive for cough (minimal), shortness of breath and wheezing  Cardiovascular: Negative for chest pain  Gastrointestinal: Negative for nausea and vomiting  Neurological: Negative for dizziness and headaches           Current Medications       Current Outpatient Medications:     albuterol (2 5 mg/3 mL) 0 083 % nebulizer solution, Take 1 vial (2 5 mg total) by nebulization every 6 (six) hours as needed for wheezing or shortness of breath, Disp: 60 vial, Rfl: 0    albuterol (ProAir HFA) 90 mcg/act inhaler, Inhale 2 puffs every 6 (six) hours as needed for wheezing, Disp: 8 5 g, Rfl: 0    albuterol (ProAir HFA) 90 mcg/act inhaler, Inhale 2 puffs every 6 (six) hours as needed for wheezing, Disp: 8 5 g, Rfl: 1    cetirizine (ZyrTEC) 5 MG tablet, Take 1 tablet (5 mg total) by mouth daily, Disp: 30 tablet, Rfl: 0    cloNIDine (CATAPRES) 0 2 mg tablet, Take 0 5 tablets (0 1 mg total) by mouth 3 (three) times a day as needed for high blood pressure (heroin withdrawal), Disp: 3 tablet, Rfl: 0    fluticasone (FLOVENT HFA) 44 mcg/act inhaler, Inhale 2 puffs 2 (two) times a day Rinse mouth after use , Disp: 1 Inhaler, Rfl: 0    methylPREDNISolone 4 MG tablet therapy pack, Use as directed on package, Disp: 1 each, Rfl: 0    ondansetron (ZOFRAN-ODT) 4 mg disintegrating tablet, Take 1 tablet (4 mg total) by mouth every 8 (eight) hours as needed for nausea or vomiting, Disp: 9 tablet, Rfl: 0    predniSONE 20 mg tablet, Take 2 tablets (40 mg total) by mouth daily, Disp: 10 tablet, Rfl: 0    Current Facility-Administered Medications:     dexamethasone (DECADRON) injection 8 mg, 8 mg, Intramuscular, Once, Daron NAMRATA Villegas    Current Allergies     Allergies as of 09/01/2020    (No Known Allergies)            The following portions of the patient's history were reviewed and updated as appropriate: allergies, current medications, past family history, past medical history, past social history, past surgical history and problem list      Past Medical History:   Diagnosis Date    Asthma     Heroin abuse ([facility] [address] )        History reviewed  No pertinent surgical history  Family History   Problem Relation Age of Onset    Throat cancer Maternal Grandfather     Asthma Family          Medications have been verified  Objective   /60 (BP Location: Left arm, Patient Position: Sitting, Cuff Size: Standard)   Pulse 73   Temp (!) 97 °F (36 1 °C) (Temporal)   Resp 12   LMP 08/30/2020 (Exact Date)   SpO2 92%        Physical Exam     Physical Exam  Cardiovascular:      Rate and Rhythm: Regular rhythm     Pulmonary:      Breath sounds: Examination of the right-upper field reveals wheezing  Examination of the left-upper field reveals wheezing  Examination of the right-middle field reveals wheezing  Examination of the left-middle field reveals wheezing  Examination of the right-lower field reveals wheezing  Examination of the left-lower field reveals wheezing  Wheezing present  Comments: Slightly increased effort  Neurological:      Mental Status: She is alert

## 2020-09-01 NOTE — PATIENT INSTRUCTIONS
Asthma   WHAT YOU NEED TO KNOW:   Asthma is a lung disease that makes breathing difficult  Chronic inflammation and reactions to triggers narrow the airways in the lungs  Asthma can become life-threatening if it is not managed  DISCHARGE INSTRUCTIONS:   Return to the emergency department if:   · You have severe shortness of breath  · Your lips or nails turn blue or gray  · The skin around your neck and ribs pulls in with each breath  · You have shortness of breath, even after you take your short-term medicine as directed  · Your peak flow numbers are in the red zone of your AAP  Contact your healthcare provider if:   · You run out of medicine before your next refill is due  · Your symptoms get worse  · You need to take more medicine than usual to control your symptoms  · You have questions or concerns about your condition or care  Medicines:   · Medicines  decrease inflammation, open airways, and make it easier to breathe  Medicines may be inhaled, taken as a pill, or injected  Short-term medicines relieve your symptoms quickly  Long-term medicines are used to prevent future attacks  You may also need medicine to help control your allergies  Ask your healthcare provider for more information about the medicine you are given and how to take it safely  · Take your medicine as directed  Contact your healthcare provider if you think your medicine is not helping or if you have side effects  Tell him of her if you are allergic to any medicine  Keep a list of the medicines, vitamins, and herbs you take  Include the amounts, and when and why you take them  Bring the list or the pill bottles to follow-up visits  Carry your medicine list with you in case of an emergency  Follow up with your healthcare provider as directed: You will need to return to make sure your medicine is working and your symptoms are controlled   You may be referred to an asthma specialist  Avril Mondragon may be asked to keep a record of your peak flow values and bring it with you to your appointments  Write down your questions so you remember to ask them during your visits  Manage your symptoms and prevent future attacks:   · Follow your Asthma Action Plan (AAP)  This is a written plan that you and your healthcare provider create  It explains which medicine you need and when to change doses if necessary  It also explains how you can monitor symptoms and use a peak flow meter  The meter measures how well your lungs are working  · Manage other health conditions , such as allergies, acid reflux, and sleep apnea  · Identify and avoid triggers  These may include pets, dust mites, mold, and cockroaches  · Do not smoke or be around others who smoke  Nicotine and other chemicals in cigarettes and cigars can cause lung damage  Ask your healthcare provider for information if you currently smoke and need help to quit  E-cigarettes or smokeless tobacco still contain nicotine  Talk to your healthcare provider before you use these products  · Ask about the flu vaccine  The flu can make your asthma worse  You may need a yearly flu shot  © 2017 [facility] Information is for End User's use only and may not be sold, redistributed or otherwise used for commercial purposes  All illustrations and images included in CareNotes® are the copyrighted property of A D A M , Inc  or Loc Fernandes  The above information is an  only  It is not intended as medical advice for individual conditions or treatments  Talk to your doctor, nurse or pharmacist before following any medical regimen to see if it is safe and effective for you

## 2020-09-14 ENCOUNTER — OFFICE VISIT (OUTPATIENT)
Dept: URGENT CARE | Age: 30
End: 2020-09-14

## 2020-09-14 VITALS
OXYGEN SATURATION: 97 % | SYSTOLIC BLOOD PRESSURE: 126 MMHG | TEMPERATURE: 97.9 F | HEART RATE: 85 BPM | RESPIRATION RATE: 18 BRPM | DIASTOLIC BLOOD PRESSURE: 62 MMHG

## 2020-09-14 DIAGNOSIS — K04.7 DENTAL INFECTION: Primary | ICD-10-CM

## 2020-09-14 PROCEDURE — G0382 LEV 3 HOSP TYPE B ED VISIT: HCPCS | Performed by: PHYSICIAN ASSISTANT

## 2020-09-14 RX ORDER — IBUPROFEN 600 MG/1
600 TABLET ORAL EVERY 8 HOURS SCHEDULED
Qty: 30 TABLET | Refills: 0 | Status: SHIPPED | OUTPATIENT
Start: 2020-09-14 | End: 2021-02-17 | Stop reason: HOSPADM

## 2020-09-14 RX ORDER — AMOXICILLIN AND CLAVULANATE POTASSIUM 875; 125 MG/1; MG/1
1 TABLET, FILM COATED ORAL EVERY 12 HOURS SCHEDULED
Qty: 20 TABLET | Refills: 0 | Status: SHIPPED | OUTPATIENT
Start: 2020-09-14 | End: 2020-09-24

## 2020-09-14 NOTE — PROGRESS NOTES
[id number]        NAME: Walter Banuelos is a 35 y o  female  : 1990    MRN: [id number]  DATE: 2020  TIME: 11:43 AM    Assessment and Plan   Dental infection [K04 7]  1  Dental infection  amoxicillin-clavulanate (AUGMENTIN) 875-125 mg per tablet    ibuprofen (MOTRIN) 600 mg tablet         Patient Instructions       Follow up with PCP in 3-5 days  Proceed to  ER if symptoms worsen  Chief Complaint     Chief Complaint   Patient presents with    Dental Pain     c/o cracked tooth,left bac, with pain x 2 days   only use of ambisol          History of Present Illness       Patient is here for evaluation of pain swelling in her right lower jaw  Patient has had a dental fracture therefore while and over the last couple days it has gotten more painful and swollen  She has been trying to do the soaks with warm water and mouthwash but symptoms are persisting  Review of Systems   Review of Systems   Constitutional: Negative  HENT: Positive for dental problem  Negative for drooling, sore throat, trouble swallowing and voice change            Current Medications       Current Outpatient Medications:     albuterol (2 5 mg/3 mL) 0 083 % nebulizer solution, Take 1 vial (2 5 mg total) by nebulization every 6 (six) hours as needed for wheezing or shortness of breath, Disp: 60 vial, Rfl: 0    albuterol (ProAir HFA) 90 mcg/act inhaler, Inhale 2 puffs every 6 (six) hours as needed for wheezing, Disp: 8 5 g, Rfl: 0    albuterol (ProAir HFA) 90 mcg/act inhaler, Inhale 2 puffs every 6 (six) hours as needed for wheezing, Disp: 8 5 g, Rfl: 1    amoxicillin-clavulanate (AUGMENTIN) 875-125 mg per tablet, Take 1 tablet by mouth every 12 (twelve) hours for 10 days, Disp: 20 tablet, Rfl: 0    cetirizine (ZyrTEC) 5 MG tablet, Take 1 tablet (5 mg total) by mouth daily (Patient not taking: Reported on 2020), Disp: 30 tablet, Rfl: 0    cloNIDine (CATAPRES) 0 2 mg tablet, Take 0 5 tablets (0 1 mg total) by mouth 3 (three) times a day as needed for high blood pressure (heroin withdrawal) (Patient not taking: Reported on 9/14/2020), Disp: 3 tablet, Rfl: 0    fluticasone (FLOVENT HFA) 44 mcg/act inhaler, Inhale 2 puffs 2 (two) times a day Rinse mouth after use  (Patient not taking: Reported on 9/14/2020), Disp: 1 Inhaler, Rfl: 0    ibuprofen (MOTRIN) 600 mg tablet, Take 1 tablet (600 mg total) by mouth every 8 (eight) hours for 10 days, Disp: 30 tablet, Rfl: 0    methylPREDNISolone 4 MG tablet therapy pack, Use as directed on package (Patient not taking: Reported on 9/14/2020), Disp: 1 each, Rfl: 0    ondansetron (ZOFRAN-ODT) 4 mg disintegrating tablet, Take 1 tablet (4 mg total) by mouth every 8 (eight) hours as needed for nausea or vomiting (Patient not taking: Reported on 9/14/2020), Disp: 9 tablet, Rfl: 0    predniSONE 20 mg tablet, Take 2 tablets (40 mg total) by mouth daily (Patient not taking: Reported on 9/14/2020), Disp: 10 tablet, Rfl: 0    Current Allergies     Allergies as of 09/14/2020    (No Known Allergies)            The following portions of the patient's history were reviewed and updated as appropriate: allergies, current medications, past family history, past medical history, past social history, past surgical history and problem list      Past Medical History:   Diagnosis Date    Asthma     Heroin abuse ([facility] [address] )        History reviewed  No pertinent surgical history  Family History   Problem Relation Age of Onset    Throat cancer Maternal Grandfather     Asthma Family          Medications have been verified  Objective   /62   Pulse 85   Temp 97 9 °F (36 6 °C)   Resp 18   LMP 08/30/2020 (Exact Date)   SpO2 97%        Physical Exam     Physical Exam  Vitals signs and nursing note reviewed  Constitutional:       General: She is not in acute distress  Appearance: Normal appearance  She is well-developed  She is not ill-appearing, toxic-appearing or diaphoretic  HENT:      Head: Atraumatic  Comments: Swelling of the right lower jaw all with tenderness present  Mouth/Throat:      Comments: Right lower jaw all dental fracture  Eyes:      Extraocular Movements: Extraocular movements intact  Conjunctiva/sclera: Conjunctivae normal       Pupils: Pupils are equal, round, and reactive to light  Skin:     General: Skin is warm and dry  Findings: No rash  Neurological:      General: No focal deficit present  Mental Status: She is alert and oriented to person, place, and time  Psychiatric:         Mood and Affect: Mood normal          Behavior: Behavior normal          Thought Content:  Thought content normal          Judgment: Judgment normal

## 2020-09-14 NOTE — PATIENT INSTRUCTIONS
Continue warm saltwater rinses  Apply moist heat    Follow-up with the dentist as soon as possible      Go to emergency room if symptoms are worsening

## 2021-01-03 ENCOUNTER — OFFICE VISIT (OUTPATIENT)
Dept: URGENT CARE | Age: 31
End: 2021-01-03

## 2021-01-03 VITALS
RESPIRATION RATE: 20 BRPM | HEART RATE: 118 BPM | DIASTOLIC BLOOD PRESSURE: 88 MMHG | SYSTOLIC BLOOD PRESSURE: 135 MMHG | OXYGEN SATURATION: 97 % | TEMPERATURE: 98.3 F

## 2021-01-03 DIAGNOSIS — J45.909 ASTHMA, UNSPECIFIED ASTHMA SEVERITY, UNSPECIFIED WHETHER COMPLICATED, UNSPECIFIED WHETHER PERSISTENT: Primary | ICD-10-CM

## 2021-01-03 PROCEDURE — 99213 OFFICE O/P EST LOW 20 MIN: CPT | Performed by: PHYSICIAN ASSISTANT

## 2021-01-03 RX ORDER — ALBUTEROL SULFATE 90 UG/1
2 AEROSOL, METERED RESPIRATORY (INHALATION) EVERY 6 HOURS PRN
Qty: 1 INHALER | Refills: 0 | Status: SHIPPED | OUTPATIENT
Start: 2021-01-03 | End: 2021-04-27

## 2021-01-03 RX ORDER — PREDNISONE 10 MG/1
TABLET ORAL
Qty: 21 TABLET | Refills: 0 | Status: ON HOLD | OUTPATIENT
Start: 2021-01-03 | End: 2021-02-17

## 2021-01-03 RX ORDER — ALBUTEROL SULFATE 2.5 MG/3ML
2.5 SOLUTION RESPIRATORY (INHALATION) EVERY 6 HOURS PRN
Qty: 30 VIAL | Refills: 0 | Status: SHIPPED | OUTPATIENT
Start: 2021-01-03 | End: 2021-04-27

## 2021-01-03 NOTE — PROGRESS NOTES
[address]        NAME: Van Venegas is a 35 y o  female  : 1990    MRN: [id number]  DATE: January 3, 2021  TIME: 5:17 PM    Assessment and Plan   Asthma, unspecified asthma severity, unspecified whether complicated, unspecified whether persistent [J45 909]  1  Asthma, unspecified asthma severity, unspecified whether complicated, unspecified whether persistent  albuterol (PROVENTIL HFA,VENTOLIN HFA) 90 mcg/act inhaler    albuterol (2 5 mg/3 mL) 0 083 % nebulizer solution    predniSONE 10 mg tablet         Patient Instructions       Follow up with PCP in 3-5 days  Proceed to  ER if symptoms worsen  Chief Complaint     Chief Complaint   Patient presents with    Asthma     pt states ran out of her inhaler and nebulizer medication 2 days ago, c/o difficulty breathing  Also states needs a nebulizer machine, has been using her friends         History of Present Illness       Patient with history of asthma and is out of her inhaler and her nebules  Review of Systems   Review of Systems   Constitutional: Negative  HENT: Negative  Respiratory: Positive for chest tightness, shortness of breath and wheezing  Negative for cough  Cardiovascular: Negative  Gastrointestinal: Negative            Current Medications       Current Outpatient Medications:     albuterol (2 5 mg/3 mL) 0 083 % nebulizer solution, Take 1 vial (2 5 mg total) by nebulization every 6 (six) hours as needed for wheezing or shortness of breath, Disp: 30 vial, Rfl: 0    albuterol (PROVENTIL HFA,VENTOLIN HFA) 90 mcg/act inhaler, Inhale 2 puffs every 6 (six) hours as needed for wheezing, Disp: 1 Inhaler, Rfl: 0    cetirizine (ZyrTEC) 5 MG tablet, Take 1 tablet (5 mg total) by mouth daily (Patient not taking: Reported on 2020), Disp: 30 tablet, Rfl: 0    cloNIDine (CATAPRES) 0 2 mg tablet, Take 0 5 tablets (0 1 mg total) by mouth 3 (three) times a day as needed for high blood pressure (heroin withdrawal) (Patient not taking: Reported on 9/14/2020), Disp: 3 tablet, Rfl: 0    fluticasone (FLOVENT HFA) 44 mcg/act inhaler, Inhale 2 puffs 2 (two) times a day Rinse mouth after use  (Patient not taking: Reported on 9/14/2020), Disp: 1 Inhaler, Rfl: 0    ibuprofen (MOTRIN) 600 mg tablet, Take 1 tablet (600 mg total) by mouth every 8 (eight) hours for 10 days, Disp: 30 tablet, Rfl: 0    ondansetron (ZOFRAN-ODT) 4 mg disintegrating tablet, Take 1 tablet (4 mg total) by mouth every 8 (eight) hours as needed for nausea or vomiting (Patient not taking: Reported on 9/14/2020), Disp: 9 tablet, Rfl: 0    predniSONE 10 mg tablet, Six tablets day 1; 5 tablets day to; 4 tablets day 3; 3 tablets day 4; 2 tablets day 5; and 1 tablet day 6, Disp: 21 tablet, Rfl: 0    Current Allergies     Allergies as of 01/03/2021    (No Known Allergies)            The following portions of the patient's history were reviewed and updated as appropriate: allergies, current medications, past family history, past medical history, past social history, past surgical history and problem list      Past Medical History:   Diagnosis Date    Asthma     Heroin abuse ([facility] [address] )        History reviewed  No pertinent surgical history  Family History   Problem Relation Age of Onset    Throat cancer Maternal Grandfather     Asthma Family          Medications have been verified  Objective   /88   Pulse (!) 118   Temp 98 3 °F (36 8 °C)   Resp 20   LMP 12/28/2020   SpO2 97%   Breastfeeding No   Patient's last menstrual period was 12/28/2020  Physical Exam     Physical Exam  Vitals signs and nursing note reviewed  Constitutional:       General: She is not in acute distress  Appearance: Normal appearance  She is well-developed  She is not ill-appearing, toxic-appearing or diaphoretic  HENT:      Head: Normocephalic and atraumatic  Eyes:      Extraocular Movements: Extraocular movements intact        Conjunctiva/sclera: Conjunctivae normal  Pupils: Pupils are equal, round, and reactive to light  Cardiovascular:      Rate and Rhythm: Normal rate and regular rhythm  Heart sounds: Normal heart sounds  Pulmonary:      Effort: Pulmonary effort is normal  No respiratory distress  Breath sounds: No stridor  Wheezing present  No rhonchi or rales  Skin:     General: Skin is warm and dry  Findings: No rash  Neurological:      General: No focal deficit present  Mental Status: She is alert and oriented to person, place, and time  Psychiatric:         Mood and Affect: Mood normal          Behavior: Behavior normal          Thought Content:  Thought content normal          Judgment: Judgment normal

## 2021-01-03 NOTE — PATIENT INSTRUCTIONS
Go to emergency room for any worsening symptoms    Follow-up with your primary care physician for further evaluation treatment

## 2021-01-11 ENCOUNTER — OFFICE VISIT (OUTPATIENT)
Dept: URGENT CARE | Age: 31
End: 2021-01-11

## 2021-01-11 VITALS
OXYGEN SATURATION: 97 % | SYSTOLIC BLOOD PRESSURE: 126 MMHG | HEART RATE: 88 BPM | DIASTOLIC BLOOD PRESSURE: 59 MMHG | TEMPERATURE: 96.7 F | RESPIRATION RATE: 20 BRPM

## 2021-01-11 DIAGNOSIS — K04.7 DENTAL INFECTION: Primary | ICD-10-CM

## 2021-01-11 PROCEDURE — 99213 OFFICE O/P EST LOW 20 MIN: CPT | Performed by: PHYSICIAN ASSISTANT

## 2021-01-11 RX ORDER — AMOXICILLIN 500 MG/1
500 CAPSULE ORAL EVERY 8 HOURS SCHEDULED
Qty: 21 CAPSULE | Refills: 0 | Status: SHIPPED | OUTPATIENT
Start: 2021-01-11 | End: 2021-01-18

## 2021-01-11 RX ORDER — AMOXICILLIN 500 MG/1
500 CAPSULE ORAL EVERY 8 HOURS SCHEDULED
Qty: 21 CAPSULE | Refills: 0 | Status: SHIPPED | OUTPATIENT
Start: 2021-01-11 | End: 2021-01-11

## 2021-01-11 NOTE — PROGRESS NOTES
[address]        NAME: Juana Ochoa is a 35 y o  female  : 1990    MRN: [id number]  DATE: 2021  TIME: 1:08 PM    Assessment and Plan   Dental infection [K04 7]  1  Dental infection  amoxicillin (AMOXIL) 500 mg capsule     Patient Instructions     Take the antibiotic as directed with food and water  Take a probiotic while taking this medication  Perform saltwater swishes as needed for discomfort, especially after eating  Continue Tylenol or ibuprofen for discomfort relief  Follow up with dentist in 3-5 days  Proceed to ER if symptoms worsen  Chief Complaint     Chief Complaint   Patient presents with    Dental Pain     pt c/o pain in teeth in lower jaw and left upper jaw for past few days         History of Present Illness       18-year-old female presenting with complaint of dental infection times 2-3 days  Reports she fractured a tooth 1 week ago and since then has had pain of the lower left jaw that has now spread across to the right side  Pain has been intensifying over the past day or two  Now ronnie redness and swelling of the gums  No drainage for abscess formation  Denies fever, chills, or sweats  Treating with ibuprofen Tylenol with little relief  Review of Systems   Review of Systems   Constitutional: Negative for chills, diaphoresis and fever  HENT: Positive for dental problem  Negative for congestion, ear pain and rhinorrhea  Skin: Negative for color change  Neurological: Negative for dizziness and headaches           Current Medications       Current Outpatient Medications:     albuterol (2 5 mg/3 mL) 0 083 % nebulizer solution, Take 1 vial (2 5 mg total) by nebulization every 6 (six) hours as needed for wheezing or shortness of breath, Disp: 30 vial, Rfl: 0    albuterol (PROVENTIL HFA,VENTOLIN HFA) 90 mcg/act inhaler, Inhale 2 puffs every 6 (six) hours as needed for wheezing, Disp: 1 Inhaler, Rfl: 0    predniSONE 10 mg tablet, Six tablets day 1; 5 tablets day to; 4 tablets day 3; 3 tablets day 4; 2 tablets day 5; and 1 tablet day 6, Disp: 21 tablet, Rfl: 0    amoxicillin (AMOXIL) 500 mg capsule, Take 1 capsule (500 mg total) by mouth every 8 (eight) hours for 7 days, Disp: 21 capsule, Rfl: 0    cetirizine (ZyrTEC) 5 MG tablet, Take 1 tablet (5 mg total) by mouth daily (Patient not taking: Reported on 9/14/2020), Disp: 30 tablet, Rfl: 0    cloNIDine (CATAPRES) 0 2 mg tablet, Take 0 5 tablets (0 1 mg total) by mouth 3 (three) times a day as needed for high blood pressure (heroin withdrawal) (Patient not taking: Reported on 9/14/2020), Disp: 3 tablet, Rfl: 0    fluticasone (FLOVENT HFA) 44 mcg/act inhaler, Inhale 2 puffs 2 (two) times a day Rinse mouth after use  (Patient not taking: Reported on 9/14/2020), Disp: 1 Inhaler, Rfl: 0    ibuprofen (MOTRIN) 600 mg tablet, Take 1 tablet (600 mg total) by mouth every 8 (eight) hours for 10 days, Disp: 30 tablet, Rfl: 0    ondansetron (ZOFRAN-ODT) 4 mg disintegrating tablet, Take 1 tablet (4 mg total) by mouth every 8 (eight) hours as needed for nausea or vomiting (Patient not taking: Reported on 9/14/2020), Disp: 9 tablet, Rfl: 0    Current Allergies     Allergies as of 01/11/2021    (No Known Allergies)            The following portions of the patient's history were reviewed and updated as appropriate: allergies, current medications, past family history, past medical history, past social history, past surgical history and problem list      Past Medical History:   Diagnosis Date    Asthma     Heroin abuse ([facility] [address] )        History reviewed  No pertinent surgical history  Family History   Problem Relation Age of Onset    Throat cancer Maternal Grandfather     Asthma Family          Medications have been verified  Objective   /59   Pulse 88   Temp (!) 96 7 °F (35 9 °C)   Resp 20   LMP 12/28/2020   SpO2 97%   Patient's last menstrual period was 12/28/2020         Physical Exam     Physical Exam  Vitals signs and nursing note reviewed  Constitutional:       General: She is not in acute distress  Appearance: She is well-developed  She is not ill-appearing or diaphoretic  HENT:      Head: Normocephalic and atraumatic  Comments: Edema and erythema of the gingiva of lower left side with fracture of teeth #s 17 and 19  No fluctuance or active drainage  TTP  Eyes:      General: Lids are normal       Conjunctiva/sclera: Conjunctivae normal    Cardiovascular:      Rate and Rhythm: Normal rate  Pulmonary:      Effort: Pulmonary effort is normal    Lymphadenopathy:      Cervical: No cervical adenopathy  Skin:     General: Skin is warm and dry  Neurological:      General: No focal deficit present  Mental Status: She is alert  She is not disoriented  Cranial Nerves: Cranial nerves are intact  Coordination: Coordination normal       Gait: Gait normal    Psychiatric:         Behavior: Behavior normal  Behavior is cooperative  Thought Content:  Thought content normal          Judgment: Judgment normal

## 2021-01-11 NOTE — PATIENT INSTRUCTIONS
Take the antibiotic as directed with food and water  Take a probiotic while taking this medication  Perform saltwater swishes as needed for discomfort, especially after eating  Continue Tylenol or ibuprofen for discomfort relief  Follow up with dentist in 3-5 days  Proceed to ER if symptoms worsen

## 2021-02-16 ENCOUNTER — HOSPITAL ENCOUNTER (EMERGENCY)
Facility: HOSPITAL | Age: 31
Discharge: HOME | End: 2021-02-16
Attending: EMERGENCY MEDICINE
Payer: COMMERCIAL

## 2021-02-16 VITALS
OXYGEN SATURATION: 99 % | TEMPERATURE: 98.7 F | RESPIRATION RATE: 17 BRPM | SYSTOLIC BLOOD PRESSURE: 130 MMHG | DIASTOLIC BLOOD PRESSURE: 65 MMHG | HEART RATE: 89 BPM

## 2021-02-16 DIAGNOSIS — R11.2 CANNABINOID HYPEREMESIS SYNDROME: Primary | ICD-10-CM

## 2021-02-16 DIAGNOSIS — F12.90 CANNABINOID HYPEREMESIS SYNDROME: Primary | ICD-10-CM

## 2021-02-16 LAB
ALBUMIN SERPL BCP-MCNC: 4.2 G/DL (ref 3.5–5)
ALP SERPL-CCNC: 102 U/L (ref 46–116)
ALT SERPL W P-5'-P-CCNC: 27 U/L (ref 12–78)
AMORPH URATE CRY URNS QL MICRO: NORMAL /HPF
ANION GAP SERPL CALCULATED.3IONS-SCNC: 12 MMOL/L (ref 4–13)
AST SERPL W P-5'-P-CCNC: 17 U/L (ref 5–45)
BACTERIA UR QL AUTO: NORMAL /HPF
BASOPHILS # BLD AUTO: 0.05 THOUSANDS/ΜL (ref 0–0.1)
BASOPHILS NFR BLD AUTO: 0 % (ref 0–1)
BILIRUB SERPL-MCNC: 0.63 MG/DL (ref 0.2–1)
BILIRUB UR QL STRIP: ABNORMAL
BUN SERPL-MCNC: 16 MG/DL (ref 5–25)
CALCIUM SERPL-MCNC: 9.9 MG/DL (ref 8.3–10.1)
CHLORIDE SERPL-SCNC: 99 MMOL/L (ref 100–108)
CLARITY UR: ABNORMAL
CO2 SERPL-SCNC: 26 MMOL/L (ref 21–32)
COLOR UR: ABNORMAL
CREAT SERPL-MCNC: 0.77 MG/DL (ref 0.6–1.3)
EOSINOPHIL # BLD AUTO: 0 THOUSAND/ΜL (ref 0–0.61)
EOSINOPHIL NFR BLD AUTO: 0 % (ref 0–6)
ERYTHROCYTE [DISTWIDTH] IN BLOOD BY AUTOMATED COUNT: 12 % (ref 11.6–15.1)
EXT PREG TEST URINE: NEGATIVE
EXT. CONTROL ED NAV: NORMAL
GFR SERPL CREATININE-BSD FRML MDRD: 105 ML/MIN/1.73SQ M
GLUCOSE SERPL-MCNC: 118 MG/DL (ref 65–140)
GLUCOSE UR STRIP-MCNC: NEGATIVE MG/DL
HCT VFR BLD AUTO: 47.7 % (ref 34.8–46.1)
HGB BLD-MCNC: 16.1 G/DL (ref 11.5–15.4)
HGB UR QL STRIP.AUTO: NEGATIVE
IMM GRANULOCYTES # BLD AUTO: 0.08 THOUSAND/UL (ref 0–0.2)
IMM GRANULOCYTES NFR BLD AUTO: 1 % (ref 0–2)
KETONES UR STRIP-MCNC: ABNORMAL MG/DL
LEUKOCYTE ESTERASE UR QL STRIP: NEGATIVE
LIPASE SERPL-CCNC: 157 U/L (ref 73–393)
LYMPHOCYTES # BLD AUTO: 1.84 THOUSANDS/ΜL (ref 0.6–4.47)
LYMPHOCYTES NFR BLD AUTO: 12 % (ref 14–44)
MCH RBC QN AUTO: 29.1 PG (ref 26.8–34.3)
MCHC RBC AUTO-ENTMCNC: 33.8 G/DL (ref 31.4–37.4)
MCV RBC AUTO: 86 FL (ref 82–98)
MONOCYTES # BLD AUTO: 0.61 THOUSAND/ΜL (ref 0.17–1.22)
MONOCYTES NFR BLD AUTO: 4 % (ref 4–12)
NEUTROPHILS # BLD AUTO: 12.35 THOUSANDS/ΜL (ref 1.85–7.62)
NEUTS SEG NFR BLD AUTO: 83 % (ref 43–75)
NITRITE UR QL STRIP: NEGATIVE
NON-SQ EPI CELLS URNS QL MICRO: NORMAL /HPF
NRBC BLD AUTO-RTO: 0 /100 WBCS
PH UR STRIP.AUTO: 5.5 [PH] (ref 4.5–8)
PLATELET # BLD AUTO: 337 THOUSANDS/UL (ref 149–390)
PMV BLD AUTO: 9.6 FL (ref 8.9–12.7)
POTASSIUM SERPL-SCNC: 3.8 MMOL/L (ref 3.5–5.3)
PROT SERPL-MCNC: 8.5 G/DL (ref 6.4–8.2)
PROT UR STRIP-MCNC: ABNORMAL MG/DL
RBC # BLD AUTO: 5.54 MILLION/UL (ref 3.81–5.12)
RBC #/AREA URNS AUTO: NORMAL /HPF
SODIUM SERPL-SCNC: 137 MMOL/L (ref 136–145)
SP GR UR STRIP.AUTO: >=1.03 (ref 1–1.03)
UROBILINOGEN UR QL STRIP.AUTO: 0.2 E.U./DL
WBC # BLD AUTO: 14.93 THOUSAND/UL (ref 4.31–10.16)
WBC #/AREA URNS AUTO: NORMAL /HPF

## 2021-02-16 PROCEDURE — 81025 URINE PREGNANCY TEST: CPT | Performed by: EMERGENCY MEDICINE

## 2021-02-16 PROCEDURE — 80053 COMPREHEN METABOLIC PANEL: CPT | Performed by: EMERGENCY MEDICINE

## 2021-02-16 PROCEDURE — 96375 TX/PRO/DX INJ NEW DRUG ADDON: CPT

## 2021-02-16 PROCEDURE — 96361 HYDRATE IV INFUSION ADD-ON: CPT

## 2021-02-16 PROCEDURE — 81001 URINALYSIS AUTO W/SCOPE: CPT

## 2021-02-16 PROCEDURE — 85025 COMPLETE CBC W/AUTO DIFF WBC: CPT | Performed by: EMERGENCY MEDICINE

## 2021-02-16 PROCEDURE — 96374 THER/PROPH/DIAG INJ IV PUSH: CPT

## 2021-02-16 PROCEDURE — 36415 COLL VENOUS BLD VENIPUNCTURE: CPT

## 2021-02-16 PROCEDURE — 99284 EMERGENCY DEPT VISIT MOD MDM: CPT

## 2021-02-16 PROCEDURE — 83690 ASSAY OF LIPASE: CPT | Performed by: EMERGENCY MEDICINE

## 2021-02-16 PROCEDURE — 99284 EMERGENCY DEPT VISIT MOD MDM: CPT | Performed by: EMERGENCY MEDICINE

## 2021-02-16 RX ORDER — DIPHENHYDRAMINE HYDROCHLORIDE 50 MG/ML
25 INJECTION INTRAMUSCULAR; INTRAVENOUS ONCE
Status: DISCONTINUED | OUTPATIENT
Start: 2021-02-16 | End: 2021-02-16

## 2021-02-16 RX ORDER — ONDANSETRON 4 MG/1
4 TABLET, FILM COATED ORAL EVERY 6 HOURS
Qty: 12 TABLET | Refills: 0 | Status: ON HOLD | OUTPATIENT
Start: 2021-02-16 | End: 2021-02-17 | Stop reason: SDUPTHER

## 2021-02-16 RX ORDER — ONDANSETRON 4 MG/1
4 TABLET, ORALLY DISINTEGRATING ORAL ONCE
Status: COMPLETED | OUTPATIENT
Start: 2021-02-16 | End: 2021-02-16

## 2021-02-16 RX ORDER — ONDANSETRON 2 MG/ML
4 INJECTION INTRAMUSCULAR; INTRAVENOUS ONCE
Status: COMPLETED | OUTPATIENT
Start: 2021-02-16 | End: 2021-02-16

## 2021-02-16 RX ORDER — METOCLOPRAMIDE HYDROCHLORIDE 5 MG/ML
10 INJECTION INTRAMUSCULAR; INTRAVENOUS ONCE
Status: DISCONTINUED | OUTPATIENT
Start: 2021-02-16 | End: 2021-02-16

## 2021-02-16 RX ADMIN — SODIUM CHLORIDE 1000 ML: 0.9 INJECTION, SOLUTION INTRAVENOUS at 15:04

## 2021-02-16 RX ADMIN — FAMOTIDINE 20 MG: 10 INJECTION INTRAVENOUS at 15:18

## 2021-02-16 RX ADMIN — ONDANSETRON 4 MG: 2 INJECTION INTRAMUSCULAR; INTRAVENOUS at 15:18

## 2021-02-16 RX ADMIN — ONDANSETRON 4 MG: 4 TABLET, ORALLY DISINTEGRATING ORAL at 12:59

## 2021-02-16 NOTE — ED PROVIDER NOTES
History  Chief Complaint   Patient presents with    Abdominal Pain     Pt  reports abdominal pain and vomitting since yesterday  Pt  reports multiple episodes of vomitting  This is a 81-year-old female presents emergency department with multiple episodes of vomiting since yesterday  The patient states that she is having trouble keeping anything down  LMP is 2 weeks late  She has concern for pregnancy  She has no abnormal vaginal bleeding  No pelvic pain  No cough, no runny nose, no shortness of breath  No dysuria frequency urgency  Symptoms are moderate severity  Worse with eating  No aggravating or alleviating factors other than those noted  No other associated symptoms  No exposure to COVID-19  Prior to Admission Medications   Prescriptions Last Dose Informant Patient Reported? Taking? albuterol (2 5 mg/3 mL) 0 083 % nebulizer solution   No No   Sig: Take 1 vial (2 5 mg total) by nebulization every 6 (six) hours as needed for wheezing or shortness of breath   albuterol (PROVENTIL HFA,VENTOLIN HFA) 90 mcg/act inhaler   No No   Sig: Inhale 2 puffs every 6 (six) hours as needed for wheezing   cetirizine (ZyrTEC) 5 MG tablet   No No   Sig: Take 1 tablet (5 mg total) by mouth daily   Patient not taking: Reported on 9/14/2020   cloNIDine (CATAPRES) 0 2 mg tablet   No No   Sig: Take 0 5 tablets (0 1 mg total) by mouth 3 (three) times a day as needed for high blood pressure (heroin withdrawal)   Patient not taking: Reported on 9/14/2020   fluticasone (FLOVENT HFA) 44 mcg/act inhaler   No No   Sig: Inhale 2 puffs 2 (two) times a day Rinse mouth after use     Patient not taking: Reported on 9/14/2020   ibuprofen (MOTRIN) 600 mg tablet   No No   Sig: Take 1 tablet (600 mg total) by mouth every 8 (eight) hours for 10 days   ondansetron (ZOFRAN-ODT) 4 mg disintegrating tablet   No No   Sig: Take 1 tablet (4 mg total) by mouth every 8 (eight) hours as needed for nausea or vomiting   Patient not taking: Reported on 9/14/2020   predniSONE 10 mg tablet   No No   Sig: Six tablets day 1; 5 tablets day to; 4 tablets day 3; 3 tablets day 4; 2 tablets day 5; and 1 tablet day 6      Facility-Administered Medications: None       Past Medical History:   Diagnosis Date    Asthma     Heroin abuse ([facility] [address] )        History reviewed  No pertinent surgical history  Family History   Problem Relation Age of Onset    Throat cancer Maternal Grandfather     Asthma Family      I have reviewed and agree with the history as documented  E-Cigarette/Vaping    E-Cigarette Use Never User      E-Cigarette/Vaping Substances     Social History     Tobacco Use    Smoking status: Current Every Day Smoker     Packs/day: 0 25     Years: 10 00     Pack years: 2 50     Types: Cigarettes    Smokeless tobacco: Never Used   Substance Use Topics    Alcohol use: No    Drug use: Not Currently     Types: Marijuana, Heroin, Fentanyl     Comment: insufflation of heroin        Review of Systems   Constitutional: Negative for activity change, appetite change and fever  HENT: Negative for congestion and rhinorrhea  Eyes: Negative for pain and redness  Respiratory: Negative for cough, shortness of breath and wheezing  Cardiovascular: Negative for chest pain and palpitations  Gastrointestinal: Positive for nausea and vomiting  Negative for abdominal distention, abdominal pain and diarrhea  Endocrine: Negative for polyuria  Genitourinary: Positive for menstrual problem  Negative for difficulty urinating, dysuria, frequency and urgency  Musculoskeletal: Negative for arthralgias and myalgias  Skin: Negative for color change and rash  Allergic/Immunologic: Negative for immunocompromised state  Neurological: Negative for dizziness, syncope and light-headedness  Hematological: Does not bruise/bleed easily  Psychiatric/Behavioral: Negative for confusion         Physical Exam  Physical Exam  Vitals signs and nursing note reviewed  Constitutional:       General: She is not in acute distress  Appearance: She is well-developed  HENT:      Head: Normocephalic and atraumatic  Nose: Nose normal    Eyes:      General: No scleral icterus  Conjunctiva/sclera: Conjunctivae normal    Neck:      Musculoskeletal: Normal range of motion and neck supple  Cardiovascular:      Rate and Rhythm: Normal rate and regular rhythm  Heart sounds: Normal heart sounds  Pulmonary:      Effort: Pulmonary effort is normal  No respiratory distress  Breath sounds: Normal breath sounds  No stridor  No wheezing  Abdominal:      General: There is no distension  Palpations: Abdomen is soft  Tenderness: There is no abdominal tenderness  There is no guarding or rebound  Negative signs include Tobias's sign and McBurney's sign  Musculoskeletal:         General: No deformity  Skin:     General: Skin is warm and dry  Findings: No rash  Neurological:      Mental Status: She is alert and oriented to person, place, and time  Psychiatric:         Thought Content:  Thought content normal          Vital Signs  ED Triage Vitals [02/16/21 1255]   Temperature Pulse Respirations Blood Pressure SpO2   98 7 °F (37 1 °C) 90 18 119/58 98 %      Temp Source Heart Rate Source Patient Position - Orthostatic VS BP Location FiO2 (%)   Oral Monitor Sitting Right arm --      Pain Score       Worst Possible Pain           Vitals:    02/16/21 1255 02/16/21 1506   BP: 119/58 130/65   Pulse: 90 89   Patient Position - Orthostatic VS: Sitting Lying         Visual Acuity      ED Medications  Medications   ondansetron (ZOFRAN-ODT) dispersible tablet 4 mg (4 mg Oral Given 2/16/21 1259)   sodium chloride 0 9 % bolus 1,000 mL (0 mL Intravenous Stopped 2/16/21 1616)   ondansetron (ZOFRAN) injection 4 mg (4 mg Intravenous Given 2/16/21 1518)   famotidine (PEPCID) injection 20 mg (20 mg Intravenous Given 2/16/21 1518)       Diagnostic Studies  Results Reviewed     Procedure Component Value Units Date/Time    Urine Microscopic [[id number]] Collected: 02/16/21 1505    Lab Status: Final result Specimen: Urine, Clean Catch Updated: 02/16/21 1545     RBC, UA None Seen /hpf      WBC, UA 2-4 /hpf      Epithelial Cells Occasional /hpf      Bacteria, UA Occasional /hpf      AMORPH URATES Occasional /hpf     POCT pregnancy, urine [[id number]]  (Normal) Resulted: 02/16/21 1509    Lab Status: Final result Updated: 02/16/21 1509     EXT PREG TEST UR (Ref: Negative) negative     Control valid    Urine Macroscopic, POC [[id number]]  (Abnormal) Collected: 02/16/21 1505    Lab Status: Final result Specimen: Urine Updated: 02/16/21 1507     Color, UA Milly     Clarity, UA Cloudy     pH, UA 5 5     Leukocytes, UA Negative     Nitrite, UA Negative     Protein,  (2+) mg/dl      Glucose, UA Negative mg/dl      Ketones, UA 80 (3+) mg/dl      Urobilinogen, UA 0 2 E U /dl      Bilirubin, UA Interference- unable to analyze     Blood, UA Negative     Specific Gravity, UA >=1 030    Narrative:      CLINITEK RESULT    Comprehensive metabolic panel [[id number]]  (Abnormal) Collected: 02/16/21 1306    Lab Status: Final result Specimen: Blood from Hand, Left Updated: 02/16/21 1350     Sodium 137 mmol/L      Potassium 3 8 mmol/L      Chloride 99 mmol/L      CO2 26 mmol/L      ANION GAP 12 mmol/L      BUN 16 mg/dL      Creatinine 0 77 mg/dL      Glucose 118 mg/dL      Calcium 9 9 mg/dL      AST 17 U/L      ALT 27 U/L      Alkaline Phosphatase 102 U/L      Total Protein 8 5 g/dL      Albumin 4 2 g/dL      Total Bilirubin 0 63 mg/dL      eGFR 105 ml/min/1 73sq m     Narrative:      [facility] guidelines for Chronic Kidney Disease (CKD):     Stage 1 with normal or high GFR (GFR > 90 mL/min/1 73 square meters)    Stage 2 Mild CKD (GFR = 60-89 mL/min/1 73 square meters)    Stage 3A Moderate CKD (GFR = 45-59 mL/min/1 73 square meters)    Stage 3B Moderate CKD (GFR = 30-44 mL/min/1 73 square meters)    Stage 4 Severe CKD (GFR = 15-29 mL/min/1 73 square meters)    Stage 5 End Stage CKD (GFR <15 mL/min/1 73 square meters)  Note: GFR calculation is accurate only with a steady state creatinine    Lipase [[id number]]  (Normal) Collected: 02/16/21 1306    Lab Status: Final result Specimen: Blood from Hand, Left Updated: 02/16/21 1350     Lipase 157 u/L     CBC and differential [[id number]]  (Abnormal) Collected: 02/16/21 1306    Lab Status: Final result Specimen: Blood from Hand, Left Updated: 02/16/21 1320     WBC 14 93 Thousand/uL      RBC 5 54 Million/uL      Hemoglobin 16 1 g/dL      Hematocrit 47 7 %      MCV 86 fL      MCH 29 1 pg      MCHC 33 8 g/dL      RDW 12 0 %      MPV 9 6 fL      Platelets 431 Thousands/uL      nRBC 0 /100 WBCs      Neutrophils Relative 83 %      Immat GRANS % 1 %      Lymphocytes Relative 12 %      Monocytes Relative 4 %      Eosinophils Relative 0 %      Basophils Relative 0 %      Neutrophils Absolute 12 35 Thousands/µL      Immature Grans Absolute 0 08 Thousand/uL      Lymphocytes Absolute 1 84 Thousands/µL      Monocytes Absolute 0 61 Thousand/µL      Eosinophils Absolute 0 00 Thousand/µL      Basophils Absolute 0 05 Thousands/µL                  No orders to display              Procedures  Procedures         ED Course  ED Course as of Feb 16 1618   Tue Feb 16, 2021   1453 Patient vomited after drinking some apple juice  1458 Patient states she does not want reglan as it makes her "shake all over"  1517 Patient states has not used heroin in about 1 week  She states she does not feel that this is withdrawal   She has been using marijuana very regularly recently due to stress  Increase use  We discussed cannabinoid hyperemesis and need to stop using cannabinoids for several weeks for symptom resolution  1555 Patient is feeling better  1616 Feeling better  Wants to go home                                  SBIRT 20yo+      Most Recent Value   SBIRT (22 yo +)   In order to provide better care to our patients, we are screening all of our patients for alcohol and drug use  Would it be okay to ask you these screening questions? Yes Filed at: 02/16/2021 1511   Initial Alcohol Screen: US AUDIT-C    1  How often do you have a drink containing alcohol?  0 Filed at: 02/16/2021 1511   2  How many drinks containing alcohol do you have on a typical day you are drinking? 0 Filed at: 02/16/2021 1511   3b  FEMALE Any Age, or MALE 65+: How often do you have 4 or more drinks on one occassion? 0 Filed at: 02/16/2021 1511   Audit-C Score  0 Filed at: 02/16/2021 1511   MEGHANN: How many times in the past year have you    Used an illegal drug or used a prescription medication for non-medical reasons? Never Filed at: 02/16/2021 1511                    MDM    Disposition  Final diagnoses:   Cannabinoid hyperemesis syndrome     Time reflects when diagnosis was documented in both MDM as applicable and the Disposition within this note     Time User Action Codes Description Comment    2/16/2021  3:56 PM Emma Clement Add [R11 2,  F12 90] Cannabinoid hyperemesis syndrome       ED Disposition     ED Disposition Condition Date/Time Comment    Discharge Stable Tue Feb 16, 0754  2:62 PM Enmanuel Ly discharge to home/self care  Follow-up Information     Follow up With Specialties Details Why Contact Info    Curlie Ormond, MD Family Medicine In 2 days if not imrproved [address] [place] [address]   [phone]            Patient's Medications   Discharge Prescriptions    ONDANSETRON (ZOFRAN) 4 MG TABLET    Take 1 tablet (4 mg total) by mouth every 6 (six) hours       Start Date: 2/16/2021 End Date: --       Order Dose: 4 mg       Quantity: 12 tablet    Refills: 0     No discharge procedures on file      PDMP Review     None          ED Provider  Electronically Signed by           Parth Lowe MD  02/16/21 0654

## 2021-02-17 ENCOUNTER — APPOINTMENT (EMERGENCY)
Dept: CT IMAGING | Facility: HOSPITAL | Age: 31
End: 2021-02-17
Payer: COMMERCIAL

## 2021-02-17 ENCOUNTER — HOSPITAL ENCOUNTER (OUTPATIENT)
Facility: HOSPITAL | Age: 31
Setting detail: OBSERVATION
Discharge: HOME | End: 2021-02-17
Attending: EMERGENCY MEDICINE | Admitting: INTERNAL MEDICINE
Payer: COMMERCIAL

## 2021-02-17 VITALS
TEMPERATURE: 98.1 F | HEART RATE: 82 BPM | RESPIRATION RATE: 18 BRPM | BODY MASS INDEX: 24.91 KG/M2 | OXYGEN SATURATION: 95 % | SYSTOLIC BLOOD PRESSURE: 109 MMHG | DIASTOLIC BLOOD PRESSURE: 58 MMHG | HEIGHT: 67 IN | WEIGHT: 158.73 LBS

## 2021-02-17 DIAGNOSIS — R11.2 CANNABINOID HYPEREMESIS SYNDROME: ICD-10-CM

## 2021-02-17 DIAGNOSIS — R11.0 NAUSEA: Primary | ICD-10-CM

## 2021-02-17 DIAGNOSIS — F12.90 CANNABINOID HYPEREMESIS SYNDROME: ICD-10-CM

## 2021-02-17 DIAGNOSIS — R10.9 ABDOMINAL PAIN: ICD-10-CM

## 2021-02-17 DIAGNOSIS — R11.2 NAUSEA AND VOMITING: Primary | ICD-10-CM

## 2021-02-17 PROBLEM — K59.00 CONSTIPATION: Status: ACTIVE | Noted: 2021-02-17

## 2021-02-17 PROBLEM — J96.01 ACUTE RESPIRATORY FAILURE WITH HYPOXIA (HCC): Status: RESOLVED | Noted: 2019-06-26 | Resolved: 2021-02-17

## 2021-02-17 PROBLEM — F41.9 ANXIETY: Status: ACTIVE | Noted: 2021-02-17

## 2021-02-17 PROBLEM — F19.10 DRUG ABUSE (HCC): Chronic | Status: ACTIVE | Noted: 2021-02-17

## 2021-02-17 PROBLEM — E87.6 HYPOKALEMIA: Status: ACTIVE | Noted: 2021-02-17

## 2021-02-17 PROBLEM — R39.89 SUSPECTED UTI: Status: ACTIVE | Noted: 2021-02-17

## 2021-02-17 PROBLEM — J68.0 ACUTE CHEMICAL PNEUMONITIS (HCC): Status: RESOLVED | Noted: 2019-06-26 | Resolved: 2021-02-17

## 2021-02-17 LAB
ALBUMIN SERPL BCP-MCNC: 4.3 G/DL (ref 3–5.2)
ALP SERPL-CCNC: 82 U/L (ref 43–122)
ALT SERPL W P-5'-P-CCNC: 23 U/L (ref 9–52)
AMPHETAMINES SERPL QL SCN: NEGATIVE
ANION GAP SERPL CALCULATED.3IONS-SCNC: 11 MMOL/L (ref 5–14)
AST SERPL W P-5'-P-CCNC: 29 U/L (ref 14–36)
BACTERIA UR QL AUTO: ABNORMAL /HPF
BARBITURATES UR QL: NEGATIVE
BASOPHILS # BLD AUTO: 0.1 THOUSANDS/ΜL (ref 0–0.1)
BASOPHILS NFR BLD AUTO: 1 % (ref 0–1)
BENZODIAZ UR QL: NEGATIVE
BILIRUB SERPL-MCNC: 1 MG/DL
BILIRUB UR QL STRIP: NEGATIVE
BUN SERPL-MCNC: 17 MG/DL (ref 5–25)
CALCIUM SERPL-MCNC: 9 MG/DL (ref 8.4–10.2)
CHLORIDE SERPL-SCNC: 103 MMOL/L (ref 97–108)
CLARITY UR: ABNORMAL
CO2 SERPL-SCNC: 28 MMOL/L (ref 22–30)
COCAINE UR QL: NEGATIVE
COLOR UR: ABNORMAL
CREAT SERPL-MCNC: 0.72 MG/DL (ref 0.6–1.2)
EOSINOPHIL # BLD AUTO: 0.1 THOUSAND/ΜL (ref 0–0.4)
EOSINOPHIL NFR BLD AUTO: 1 % (ref 0–6)
ERYTHROCYTE [DISTWIDTH] IN BLOOD BY AUTOMATED COUNT: 13 %
EXT PREG TEST URINE: NEGATIVE
EXT. CONTROL ED NAV: NORMAL
GFR SERPL CREATININE-BSD FRML MDRD: 114 ML/MIN/1.73SQ M
GLUCOSE SERPL-MCNC: 100 MG/DL (ref 70–99)
GLUCOSE UR STRIP-MCNC: NEGATIVE MG/DL
HCT VFR BLD AUTO: 45.1 % (ref 36–46)
HGB BLD-MCNC: 14.8 G/DL (ref 12–16)
HGB UR QL STRIP.AUTO: 10
KETONES UR STRIP-MCNC: ABNORMAL MG/DL
LEUKOCYTE ESTERASE UR QL STRIP: 25
LIPASE SERPL-CCNC: 48 U/L (ref 23–300)
LYMPHOCYTES # BLD AUTO: 3.8 THOUSANDS/ΜL (ref 0.5–4)
LYMPHOCYTES NFR BLD AUTO: 26 % (ref 25–45)
MCH RBC QN AUTO: 28.3 PG (ref 26–34)
MCHC RBC AUTO-ENTMCNC: 32.8 G/DL (ref 31–36)
MCV RBC AUTO: 86 FL (ref 80–100)
METHADONE UR QL: NEGATIVE
MONOCYTES # BLD AUTO: 0.8 THOUSAND/ΜL (ref 0.2–0.9)
MONOCYTES NFR BLD AUTO: 6 % (ref 1–10)
MUCOUS THREADS UR QL AUTO: ABNORMAL
NEUTROPHILS # BLD AUTO: 9.5 THOUSANDS/ΜL (ref 1.8–7.8)
NEUTS SEG NFR BLD AUTO: 67 % (ref 45–65)
NITRITE UR QL STRIP: NEGATIVE
NON-SQ EPI CELLS URNS QL MICRO: ABNORMAL /HPF
OPIATES UR QL SCN: POSITIVE
OXYCODONE+OXYMORPHONE UR QL SCN: NEGATIVE
PCP UR QL: NEGATIVE
PH UR STRIP.AUTO: 6 [PH]
PLATELET # BLD AUTO: 330 THOUSANDS/UL (ref 150–450)
PMV BLD AUTO: 7.9 FL (ref 8.9–12.7)
POTASSIUM SERPL-SCNC: 3.4 MMOL/L (ref 3.6–5)
PROT SERPL-MCNC: 8 G/DL (ref 5.9–8.4)
PROT UR STRIP-MCNC: ABNORMAL MG/DL
RBC # BLD AUTO: 5.23 MILLION/UL (ref 4–5.2)
RBC #/AREA URNS AUTO: ABNORMAL /HPF
SODIUM SERPL-SCNC: 142 MMOL/L (ref 137–147)
SP GR UR STRIP.AUTO: 1.02 (ref 1–1.04)
THC UR QL: POSITIVE
UROBILINOGEN UA: NEGATIVE MG/DL
WBC # BLD AUTO: 14.2 THOUSAND/UL (ref 4.5–11)
WBC #/AREA URNS AUTO: ABNORMAL /HPF

## 2021-02-17 PROCEDURE — 94640 AIRWAY INHALATION TREATMENT: CPT

## 2021-02-17 PROCEDURE — 81025 URINE PREGNANCY TEST: CPT | Performed by: EMERGENCY MEDICINE

## 2021-02-17 PROCEDURE — C9113 INJ PANTOPRAZOLE SODIUM, VIA: HCPCS | Performed by: INTERNAL MEDICINE

## 2021-02-17 PROCEDURE — 87086 URINE CULTURE/COLONY COUNT: CPT | Performed by: PHYSICIAN ASSISTANT

## 2021-02-17 PROCEDURE — 96375 TX/PRO/DX INJ NEW DRUG ADDON: CPT

## 2021-02-17 PROCEDURE — 94760 N-INVAS EAR/PLS OXIMETRY 1: CPT

## 2021-02-17 PROCEDURE — 83690 ASSAY OF LIPASE: CPT | Performed by: EMERGENCY MEDICINE

## 2021-02-17 PROCEDURE — 74177 CT ABD & PELVIS W/CONTRAST: CPT

## 2021-02-17 PROCEDURE — 99284 EMERGENCY DEPT VISIT MOD MDM: CPT | Performed by: EMERGENCY MEDICINE

## 2021-02-17 PROCEDURE — 99285 EMERGENCY DEPT VISIT HI MDM: CPT

## 2021-02-17 PROCEDURE — 80053 COMPREHEN METABOLIC PANEL: CPT | Performed by: EMERGENCY MEDICINE

## 2021-02-17 PROCEDURE — 36415 COLL VENOUS BLD VENIPUNCTURE: CPT | Performed by: EMERGENCY MEDICINE

## 2021-02-17 PROCEDURE — 94664 DEMO&/EVAL PT USE INHALER: CPT

## 2021-02-17 PROCEDURE — 81001 URINALYSIS AUTO W/SCOPE: CPT | Performed by: EMERGENCY MEDICINE

## 2021-02-17 PROCEDURE — 96361 HYDRATE IV INFUSION ADD-ON: CPT

## 2021-02-17 PROCEDURE — 96374 THER/PROPH/DIAG INJ IV PUSH: CPT

## 2021-02-17 PROCEDURE — 85025 COMPLETE CBC W/AUTO DIFF WBC: CPT | Performed by: EMERGENCY MEDICINE

## 2021-02-17 PROCEDURE — 80307 DRUG TEST PRSMV CHEM ANLYZR: CPT | Performed by: EMERGENCY MEDICINE

## 2021-02-17 PROCEDURE — 96372 THER/PROPH/DIAG INJ SC/IM: CPT

## 2021-02-17 PROCEDURE — 99236 HOSP IP/OBS SAME DATE HI 85: CPT | Performed by: INTERNAL MEDICINE

## 2021-02-17 RX ORDER — HALOPERIDOL 5 MG/ML
5 INJECTION INTRAMUSCULAR ONCE
Status: COMPLETED | OUTPATIENT
Start: 2021-02-17 | End: 2021-02-17

## 2021-02-17 RX ORDER — LORAZEPAM 2 MG/ML
2 INJECTION INTRAMUSCULAR ONCE
Status: COMPLETED | OUTPATIENT
Start: 2021-02-17 | End: 2021-02-17

## 2021-02-17 RX ORDER — SENNOSIDES 8.6 MG
1 TABLET ORAL
Status: DISCONTINUED | OUTPATIENT
Start: 2021-02-17 | End: 2021-02-17 | Stop reason: HOSPADM

## 2021-02-17 RX ORDER — POLYETHYLENE GLYCOL 3350 17 G/17G
17 POWDER, FOR SOLUTION ORAL DAILY
Status: DISCONTINUED | OUTPATIENT
Start: 2021-02-17 | End: 2021-02-17 | Stop reason: HOSPADM

## 2021-02-17 RX ORDER — POTASSIUM CHLORIDE AND SODIUM CHLORIDE 900; 300 MG/100ML; MG/100ML
100 INJECTION, SOLUTION INTRAVENOUS CONTINUOUS
Status: DISPENSED | OUTPATIENT
Start: 2021-02-17 | End: 2021-02-17

## 2021-02-17 RX ORDER — PROCHLORPERAZINE MALEATE 5 MG/1
5 TABLET ORAL EVERY 6 HOURS PRN
Status: DISCONTINUED | OUTPATIENT
Start: 2021-02-17 | End: 2021-02-17

## 2021-02-17 RX ORDER — ONDANSETRON 4 MG/1
4 TABLET, ORALLY DISINTEGRATING ORAL EVERY 6 HOURS PRN
Qty: 20 TABLET | Refills: 0 | Status: SHIPPED | OUTPATIENT
Start: 2021-02-17

## 2021-02-17 RX ORDER — CEFTRIAXONE 1 G/50ML
1000 INJECTION, SOLUTION INTRAVENOUS EVERY 24 HOURS
Status: DISCONTINUED | OUTPATIENT
Start: 2021-02-18 | End: 2021-02-17

## 2021-02-17 RX ORDER — PROCHLORPERAZINE MALEATE 5 MG/1
5 TABLET ORAL EVERY 6 HOURS PRN
Status: DISCONTINUED | OUTPATIENT
Start: 2021-02-17 | End: 2021-02-17 | Stop reason: HOSPADM

## 2021-02-17 RX ORDER — PANTOPRAZOLE SODIUM 40 MG/1
40 INJECTION, POWDER, FOR SOLUTION INTRAVENOUS
Status: DISCONTINUED | OUTPATIENT
Start: 2021-02-17 | End: 2021-02-17 | Stop reason: HOSPADM

## 2021-02-17 RX ORDER — ACETAMINOPHEN 325 MG/1
650 TABLET ORAL EVERY 4 HOURS PRN
Status: DISCONTINUED | OUTPATIENT
Start: 2021-02-17 | End: 2021-02-17 | Stop reason: HOSPADM

## 2021-02-17 RX ORDER — ONDANSETRON 2 MG/ML
4 INJECTION INTRAMUSCULAR; INTRAVENOUS EVERY 6 HOURS PRN
Status: DISCONTINUED | OUTPATIENT
Start: 2021-02-17 | End: 2021-02-17 | Stop reason: HOSPADM

## 2021-02-17 RX ORDER — ALBUTEROL SULFATE 90 UG/1
2 AEROSOL, METERED RESPIRATORY (INHALATION) EVERY 4 HOURS PRN
Status: DISCONTINUED | OUTPATIENT
Start: 2021-02-17 | End: 2021-02-17 | Stop reason: HOSPADM

## 2021-02-17 RX ORDER — ONDANSETRON 2 MG/ML
4 INJECTION INTRAMUSCULAR; INTRAVENOUS ONCE
Status: COMPLETED | OUTPATIENT
Start: 2021-02-17 | End: 2021-02-17

## 2021-02-17 RX ORDER — DOCUSATE SODIUM 100 MG/1
100 CAPSULE, LIQUID FILLED ORAL 2 TIMES DAILY
Status: DISCONTINUED | OUTPATIENT
Start: 2021-02-17 | End: 2021-02-17 | Stop reason: HOSPADM

## 2021-02-17 RX ORDER — ONDANSETRON 4 MG/1
4 TABLET, FILM COATED ORAL EVERY 6 HOURS
Qty: 12 TABLET | Refills: 0 | Status: SHIPPED | OUTPATIENT
Start: 2021-02-17

## 2021-02-17 RX ORDER — CEFTRIAXONE 1 G/50ML
1000 INJECTION, SOLUTION INTRAVENOUS ONCE
Status: COMPLETED | OUTPATIENT
Start: 2021-02-17 | End: 2021-02-17

## 2021-02-17 RX ADMIN — ONDANSETRON 4 MG: 2 INJECTION INTRAMUSCULAR; INTRAVENOUS at 02:06

## 2021-02-17 RX ADMIN — PANTOPRAZOLE SODIUM 40 MG: 40 INJECTION, POWDER, LYOPHILIZED, FOR SOLUTION INTRAVENOUS at 10:06

## 2021-02-17 RX ADMIN — DOCUSATE SODIUM 100 MG: 100 CAPSULE, LIQUID FILLED ORAL at 17:35

## 2021-02-17 RX ADMIN — POTASSIUM CHLORIDE AND SODIUM CHLORIDE 100 ML/HR: 900; 300 INJECTION, SOLUTION INTRAVENOUS at 06:00

## 2021-02-17 RX ADMIN — CEFTRIAXONE 1000 MG: 1 INJECTION, SOLUTION INTRAVENOUS at 04:35

## 2021-02-17 RX ADMIN — SODIUM CHLORIDE 1000 ML: 0.9 INJECTION, SOLUTION INTRAVENOUS at 02:06

## 2021-02-17 RX ADMIN — ALBUTEROL SULFATE 2 PUFF: 90 AEROSOL, METERED RESPIRATORY (INHALATION) at 14:39

## 2021-02-17 RX ADMIN — IOHEXOL 100 ML: 350 INJECTION, SOLUTION INTRAVENOUS at 02:47

## 2021-02-17 RX ADMIN — ONDANSETRON 4 MG: 2 INJECTION INTRAMUSCULAR; INTRAVENOUS at 14:52

## 2021-02-17 RX ADMIN — HALOPERIDOL LACTATE 5 MG: 5 INJECTION, SOLUTION INTRAMUSCULAR at 02:06

## 2021-02-17 RX ADMIN — LORAZEPAM 2 MG: 2 INJECTION, SOLUTION INTRAMUSCULAR; INTRAVENOUS at 02:15

## 2021-02-17 RX ADMIN — ONDANSETRON 4 MG: 2 INJECTION INTRAMUSCULAR; INTRAVENOUS at 08:55

## 2021-02-17 NOTE — UTILIZATION REVIEW
Initial Clinical Review    Admission: Date/Time/Statement:   Admission Orders (From admission, onward)     Ordered        02/17/21 0406  Place in Observation  Once                   Orders Placed This Encounter   Procedures    Place in Observation     Standing Status:   Standing     Number of Occurrences:   1     Order Specific Question:   Level of Care     Answer:   Med Surg [16]     ED Arrival Information     Expected Arrival Acuity Means of Arrival Escorted By Service Admission Type    - 2/17/2021 01:50 [facility] General Medicine Urgent    Arrival Complaint    Abdominal Pain         Chief Complaint   Patient presents with    Vomiting     pt was [facility] at [facility] earlier today, went home and slept for 8 hours  denies taking any medications since leaving the hospital earlier      Assessment/Plan:  34 yo female Admitted to Observation with Cannabinoid hyperemesis syndrome, Anxiety, Suspected UTI, Drug Abuse, Hypokalemia  Plan is for IVF's, supportive care, antiemetics, monitor on Tele, Replete K and monitor  Pt has also been severely constipated - Aggressive bowel regimen  Leukocytosis ikely in the setting of dehydration, vomiting  No underlying infection noted  Pt with PMH of asthma and polysubstance abuse presents to ED via EMS with N/V and abdominal pain  She was seen in ED yesterday for same - went home & slept for 8 hrs & woke up vomiting again  The patient generally uses heroin and marijuana daily but claims she hasn't used either substance in a week  Anxious & agitated in ED  In acute distress  K 3 4,  Glu 100, WBC's 14 20  CT showed Stool noted throughout the colon   Correlate for constipation  Left hydrosalpinx, nonspecific          ED Triage Vitals [02/17/21 0155]   Temperature Pulse Respirations Blood Pressure SpO2   97 9 °F (36 6 °C) 88 16 (!) 107/49 98 %      Temp Source Heart Rate Source Patient Position - Orthostatic VS BP Location FiO2 (%) Tympanic Monitor Lying Left arm --      Pain Score       8          Wt Readings from Last 1 Encounters:   02/17/21 72 kg (158 lb 11 7 oz)     Additional Vital Signs:   02/17/21 1521  98 1 °F (36 7 °C)  82  18  109/58  75  95 % -- None (Room air) Lying   02/17/21 1447  --  83  18  --  --  97 % -- None (Room air) --   02/17/21 0701  98 °F (36 7 °C)  82  18  111/72  --  96 % -- None (Room air) Lying   02/17/21 0515  98 2 °F (36 8 °C)  78  20  100/58  --  94 % -- None (Room air) Lying   02/17/21 0430  --  80  22  110/53  72  93 % 2 L/min Nasal cannula Lying   02/17/21 0400  --  76  19  99/47Abnormal   64  95 % 2 L/min Nasal cannula Lying   02/17/21 0330  --  74  15  95/49Abnormal   64  96 % 2 L/min Nasal cannula Lying   02/17/21 0300  --  77  20  97/49Abnormal   65  98 % 2 L/min Nasal cannula --   02/17/21 0237  --  94  16  118/56  --  95 % 2 L/min Nasal cannula Lying   02/17/21 0235  --  --  --  --  --  90 %  -- --        Pertinent Labs/Diagnostic Test Results:   Results from last 7 days   Lab Units 02/17/21  0214 02/16/21  1306   WBC Thousand/uL 14 20* 14 93*   HEMOGLOBIN g/dL 14 8 16 1*   HEMATOCRIT % 45 1 47 7*   PLATELETS Thousands/uL 330 337   NEUTROS ABS Thousands/µL 9 50* 12 35*     Results from last 7 days   Lab Units 02/17/21  0214 02/16/21  1306   SODIUM mmol/L 142 137   POTASSIUM mmol/L 3 4* 3 8   CHLORIDE mmol/L 103 99*   CO2 mmol/L 28 26   ANION GAP mmol/L 11 12   BUN mg/dL 17 16   CREATININE mg/dL 0 72 0 77   EGFR ml/min/1 73sq m 114 105   CALCIUM mg/dL 9 0 9 9     Results from last 7 days   Lab Units 02/17/21  0214 02/16/21  1306   AST U/L 29 17   ALT U/L 23 27   ALK PHOS U/L 82 102   TOTAL PROTEIN g/dL 8 0 8 5*   ALBUMIN g/dL 4 3 4 2   TOTAL BILIRUBIN mg/dL 1 00 0 63         Results from last 7 days   Lab Units 02/17/21  0214 02/16/21  1306   GLUCOSE RANDOM mg/dL 100* 118       Results from last 7 days   Lab Units 02/17/21  0214 02/16/21  1306   LIPASE u/L 48 157     Results from last 7 days   Lab Units 02/17/21  0240 02/16/21  1505   CLARITY UA  Slightly Cloudy* Cloudy   COLOR UA  Milly* Milly   SPEC GRAV UA  1 025 >=1 030   PH UA  6 0 5 5   GLUCOSE UA mg/dl Negative Negative   KETONES UA mg/dl >=150 (3+)* 80 (3+)*   BLOOD UA  10 0* Negative   PROTEIN UA mg/dl 15 (Trace)* 100 (2+)*   NITRITE UA  Negative Negative   BILIRUBIN UA  Negative Interference- unable to analyze*   UROBILINOGEN UA mg/dL Negative 0 2   LEUKOCYTES UA  25 0* Negative   WBC UA /hpf 4-10* 2-4   RBC UA /hpf None Seen None Seen   BACTERIA UA /hpf Moderate* Occasional   EPITHELIAL CELLS WET PREP /hpf Moderate* Occasional   MUCUS THREADS  Occasional*  --      Results from last 7 days   Lab Units 02/17/21  0240   AMPH/METH  Negative   BARBITURATE UR  Negative   BENZODIAZEPINE UR  Negative   COCAINE UR  Negative   METHADONE URINE  Negative   OPIATE UR  Positive*   PCP UR  Negative   THC UR  Positive*     2/17 CT A&P: No bowel obstruction or inflammatory stranding throughout the abdomen or pelvis  Stool noted throughout the colon   Correlate for constipation  Left hydrosalpinx, nonspecific   Correlate with patient symptoms/lab values possible PID   If clinically warranted pelvic ultrasound can be obtained for further evaluation       ED Treatment:   Medication Administration from 02/17/2021 0150 to 02/17/2021 0514       Date/Time Order Dose Route Action     02/17/2021 0206 sodium chloride 0 9 % bolus 1,000 mL 1,000 mL Intravenous New Bag     02/17/2021 0206 haloperidol lactate (HALDOL) injection 5 mg 5 mg Intramuscular Given     02/17/2021 0206 ondansetron (ZOFRAN) injection 4 mg 4 mg Intravenous Given     02/17/2021 0215 LORazepam (ATIVAN) injection 2 mg 2 mg Intravenous Given     02/17/2021 0435 cefTRIAXone (ROCEPHIN) IVPB (premix in dextrose) 1,000 mg 50 mL 1,000 mg Intravenous New Bag        Past Medical History:   Diagnosis Date    Asthma     Heroin abuse ([facility] [address] )      Present on Admission:   Cigarette nicotine dependence without complication   Hypokalemia   Drug abuse ([facility] [address] )   Cannabinoid hyperemesis syndrome   Suspected UTI   Anxiety   Constipation      Admitting Diagnosis: Vomiting [R11 10]  Abdominal pain [R10 9]  Nausea and vomiting [R11 2]  Age/Sex: 35 y o  female     Admission Orders:  Scheduled Medications:  [START ON 2/18/2021] cefTRIAXone, 1,000 mg, Intravenous, Q24H  docusate sodium, 100 mg, Oral, BID  nicotine, 1 patch, Transdermal, Daily  pantoprazole, 40 mg, Intravenous, Q24H TAN  polyethylene glycol, 17 g, Oral, Daily  senna, 1 tablet, Oral, HS    Continuous IV Infusions:     PRN Meds:  acetaminophen, 650 mg, Oral, Q4H PRN  albuterol, 2 puff, Inhalation, Q4H PRN x 1  ondansetron, 4 mg, Intravenous, Q6H PRN  X 2  prochlorperazine, 5 mg, Oral, Q6H PRN    TELEMETRY    Network Utilization Review Department  ATTENTION: Please call with any questions or concerns to [phone] and carefully listen to the prompts so that you are directed to the right person  All voicemails are confidential   Reanna Guardado all requests for admission clinical reviews, approved or denied determinations and any other requests to dedicated fax number below belonging to the campus where the patient is receiving treatment   List of dedicated fax numbers for the Facilities:  [address] DENIALS (Administrative/Medical Necessity) [phone]   [address] (Maternity/NICU/Pediatrics) [phone]   [address] [phone] [address] [phone]   [address] (  Michael Thakur "[address]eda[address] 103) [address] [place] [address] [facility] [address] [phone]

## 2021-02-17 NOTE — ASSESSMENT & PLAN NOTE
· Resolved     · Improved with Anti emetics  · Supportive care  · Finished IV fluids  · Cessation has been discussed

## 2021-02-17 NOTE — ASSESSMENT & PLAN NOTE
· Patient was given Haldol IV in the ER and will need to be monitored on telemetry secondary to black box warning

## 2021-02-17 NOTE — PLAN OF CARE
Problem: PAIN - ADULT  Goal: Verbalizes/displays adequate comfort level or baseline comfort level  Description: Interventions:  - Encourage patient to monitor pain and request assistance  - Assess pain using appropriate pain scale  - Administer analgesics based on type and severity of pain and evaluate response  - Implement non-pharmacological measures as appropriate and evaluate response  - Consider cultural and social influences on pain and pain management  - Notify physician/advanced practitioner if interventions unsuccessful or patient reports new pain  Outcome: Progressing     Problem: INFECTION - ADULT  Goal: Absence or prevention of progression during hospitalization  Description: INTERVENTIONS:  - Assess and monitor for signs and symptoms of infection  - Monitor lab/diagnostic results  - Monitor all insertion sites, i e  indwelling lines, tubes, and drains  - Monitor endotracheal if appropriate and nasal secretions for changes in amount and color  - [place] appropriate cooling/warming therapies per order  - Administer medications as ordered  - Instruct and encourage patient and family to use good hand hygiene technique  - Identify and instruct in appropriate isolation precautions for identified infection/condition  Outcome: Progressing  Goal: Absence of fever/infection during neutropenic period  Description: INTERVENTIONS:  - Monitor WBC    Outcome: Progressing     Problem: SAFETY ADULT  Goal: Patient will remain free of falls  Description: INTERVENTIONS:  - Assess patient frequently for physical needs  -  Identify cognitive and physical deficits and behaviors that affect risk of falls    -  [place] fall precautions as indicated by assessment   - Educate patient/family on patient safety including physical limitations  - Instruct patient to call for assistance with activity based on assessment  - Modify environment to reduce risk of injury  - Consider OT/PT consult to assist with strengthening/mobility  Outcome: Progressing  Goal: Maintain or return to baseline ADL function  Description: INTERVENTIONS:  -  Assess patient's ability to carry out ADLs; assess patient's baseline for ADL function and identify physical deficits which impact ability to perform ADLs (bathing, care of mouth/teeth, toileting, grooming, dressing, etc )  - Assess/evaluate cause of self-care deficits   - Assess range of motion  - Assess patient's mobility; develop plan if impaired  - Assess patient's need for assistive devices and provide as appropriate  - Encourage maximum independence but intervene and supervise when necessary  - Involve family in performance of ADLs  - Assess for home care needs following discharge   - Consider OT consult to assist with ADL evaluation and planning for discharge  - Provide patient education as appropriate  Outcome: Progressing  Goal: Maintain or return mobility status to optimal level  Description: INTERVENTIONS:  - Assess patient's baseline mobility status (ambulation, transfers, stairs, etc )    - Identify cognitive and physical deficits and behaviors that affect mobility  - Identify mobility aids required to assist with transfers and/or ambulation (gait belt, sit-to-stand, lift, walker, cane, etc )  - [place] fall precautions as indicated by assessment  - Record patient progress and toleration of activity level on Mobility SBAR; progress patient to next Phase/Stage  - Instruct patient to call for assistance with activity based on assessment  - Consider rehabilitation consult to assist with strengthening/weightbearing, etc   Outcome: Progressing     Problem: DISCHARGE PLANNING  Goal: Discharge to home or other facility with appropriate resources  Description: INTERVENTIONS:  - Identify barriers to discharge w/patient and caregiver  - Arrange for needed discharge resources and transportation as appropriate  - Identify discharge learning needs (meds, wound care, etc )  - Arrange for interpretive services to assist at discharge as needed  - Refer to Case Management Department for coordinating discharge planning if the patient needs post-hospital services based on physician/advanced practitioner order or complex needs related to functional status, cognitive ability, or social support system  Outcome: Progressing     Problem: Knowledge Deficit  Goal: Patient/family/caregiver demonstrates understanding of disease process, treatment plan, medications, and discharge instructions  Description: Complete learning assessment and assess knowledge base    Interventions:  - Provide teaching at level of understanding  - Provide teaching via preferred learning methods  Outcome: Progressing     Problem: GASTROINTESTINAL - ADULT  Goal: Minimal or absence of nausea and/or vomiting  Description: INTERVENTIONS:  - Administer IV fluids if ordered to ensure adequate hydration  - Maintain NPO status until nausea and vomiting are resolved  - Nasogastric tube if ordered  - Administer ordered antiemetic medications as needed  - Provide nonpharmacologic comfort measures as appropriate  - Advance diet as tolerated, if ordered  - Consider nutrition services referral to assist patient with adequate nutrition and appropriate food choices  Outcome: Progressing
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
## 2021-02-17 NOTE — DISCHARGE INSTR - AVS FIRST PAGE
Follow up with your primary care doctor at discharge    Follow up with gynecology for the hydrosalpinx found on the CT scan  Ask your doctor if you need a pelvic ultrasound       Take Zofran as needed for nausea

## 2021-02-17 NOTE — ASSESSMENT & PLAN NOTE
· Patient was given Haldol IV in the ER and will need to be monitored on telemetry secondary to black box warning  · DC tele

## 2021-02-17 NOTE — ASSESSMENT & PLAN NOTE
· History of heroin use, reported last use 1 week ago, urine drug screen positive for opiates and THC

## 2021-02-17 NOTE — H&P
H&P- Coby Aquino [birth year], 35 y o  female MRN: [id number]    Unit/Bed#: 7T [facility] 702-02 Encounter: [id number]    Primary Care Provider: Flori Garland MD   Date and time admitted to hospital: 2/17/2021  1:51 AM        * Cannabinoid hyperemesis syndrome  Assessment & Plan  · Anti emetics  · Supportive care  · IV fluids  · Cessation has been discussed    Anxiety  Assessment & Plan  · Patient was given Haldol IV in the ER and will need to be monitored on telemetry secondary to black box warning    Constipation  Assessment & Plan  · Bowel meds  · CT with stool noted throughout the colon    Suspected UTI  Assessment & Plan  · Patient started on Rocephin in the ER    Drug abuse ([address] )  Assessment & Plan  · History of heroin use, reported last use 1 week ago, urine drug screen positive for opiates and THC    Hypokalemia  Assessment & Plan  · Replete and monitor    Cigarette nicotine dependence without complication  Assessment & Plan  · Nicotine patch 7 mg    TeleMedicine H&P - [facility] Internal Medicine    Patient Information: Coby Aquino 35 y o  female MRN: [id number]  Unit/Bed#: 7T [facility] 702-02 Encounter: [id number]  Admitting Physician: Dr Camille Jessica  PCP: Flori Garland MD  Date of Admission:  02/17/21    REQUIRED DOCUMENTATION:     1  This service was provided via Telemedicine  2  Provider located at [facility]  3  TeleMed provider: Gloria Garcia PA-C   4  Identify all parties in room with patient during tele consult:  Preston Huang RN  5  After connecting through [place], patient was identified by name and date of birth and assistant checked wristband  Patient was then informed that this was a Telemedicine visit and that the exam was being conducted confidentially over secure lines  My office door was closed  No one else was in the room    Patient acknowledged consent and understanding of privacy and security of the Telemedicine visit, and gave us permission to have the assistant stay in the room in order to assist with the history and to conduct the exam   I informed the patient that I have reviewed their record in Epic and presented the opportunity for them to ask any questions regarding the visit today  The patient agreed to participate  VTE Prophylaxis:  Not indicated by age  Code Status:  Full code  Discussion with patient    Anticipated Length of Stay:  Patient will be admitted on an Observation basis with an anticipated length of stay of  < 2 midnights  Justification for Hospital Stay:  Vitals monitoring, antiemetics, IV fluids      Chief Complaint:   Vomiting    History of Present Illness:    Elle Arias is a 35 y o  female who presents with vomiting  Patient with past medical history of asthma, polypharmacy drug abuse with heroin, marijuana and opioids was seen in [facility] on 02/16/2021 secondary to abdominal pain and vomiting and was told she had hyperemesis syndrome secondary to her cannabinoid use and recommended to his stop smoking marijuana  Patient returns to [address] [place] secondary to nausea, vomiting and abdominal pain  She was anxious/aggitated in ER and Lorazepam 2mg and  Haldol 5mg IV in the ER and will need telemetry monitoring  Patient reports for 2 days she has been vomiting  No diarrhea  Pain is epigastric area  ER treatment: Rocephin 1 gram, Haldol 5mg, Lorazepam 2mg, Zofran 4mg, 1L IVF    Review of Systems:    Review of Systems   Constitutional: Negative for chills and fever  HENT: Negative for rhinorrhea, sore throat and trouble swallowing  Eyes: Negative for discharge and redness  Respiratory: Negative for cough and shortness of breath  Cardiovascular: Negative for chest pain  Gastrointestinal: Positive for abdominal pain, nausea and vomiting  Negative for diarrhea  Genitourinary: Negative for dysuria and hematuria  Musculoskeletal: Negative for back pain and neck pain  Skin: Negative for rash and wound  Neurological: Negative for dizziness and headaches  Psychiatric/Behavioral: Negative for agitation and confusion  Past Medical and Surgical History:     Past Medical History:   Diagnosis Date    Asthma     Heroin abuse ([facility] [address] )        History reviewed  No pertinent surgical history  Meds/Allergies:    Prior to Admission medications    Medication Sig Start Date End Date Taking? Authorizing Provider   albuterol (2 5 mg/3 mL) 0 083 % nebulizer solution Take 1 vial (2 5 mg total) by nebulization every 6 (six) hours as needed for wheezing or shortness of breath 1/3/21  Yes Stanislav Tolbert PA-C   albuterol (PROVENTIL HFA,VENTOLIN HFA) 90 mcg/act inhaler Inhale 2 puffs every 6 (six) hours as needed for wheezing 1/3/21  Yes Stanislav Tolbert PA-C   ondansetron (ZOFRAN) 4 mg tablet Take 1 tablet (4 mg total) by mouth every 6 (six) hours 2/16/21  Yes Heber Barroso MD   predniSONE 10 mg tablet Six tablets day 1; 5 tablets day to; 4 tablets day 3; 3 tablets day 4; 2 tablets day 5; and 1 tablet day 6 1/3/21  Yes Stanislav Tolbert PA-C   cetirizine (ZyrTEC) 5 MG tablet Take 1 tablet (5 mg total) by mouth daily  Patient not taking: Reported on 9/14/2020 6/26/19   Radhames Hammonds DO   cloNIDine (CATAPRES) 0 2 mg tablet Take 0 5 tablets (0 1 mg total) by mouth 3 (three) times a day as needed for high blood pressure (heroin withdrawal)  Patient not taking: Reported on 9/14/2020 7/8/19   Brigette Adame MD   fluticasone (FLOVENT HFA) 44 mcg/act inhaler Inhale 2 puffs 2 (two) times a day Rinse mouth after use    Patient not taking: Reported on 9/14/2020 6/26/19   Radhames Hammonds DO   ibuprofen (MOTRIN) 600 mg tablet Take 1 tablet (600 mg total) by mouth every 8 (eight) hours for 10 days 9/14/20 9/24/20  Stanislav Tolbert PA-C   ondansetron (ZOFRAN-ODT) 4 mg disintegrating tablet Take 1 tablet (4 mg total) by mouth every 8 (eight) hours as needed for nausea or vomiting  Patient not taking: Reported on 9/14/2020 7/8/19 Bobby Hinds MD     all medications and allergies reviewed    Allergies: No Known Allergies    Social History:     Marital Status: Single   Occupation:  Unemployed  Patient Pre-hospital Living Situation:  Home  Patient Pre-hospital Level of Mobility:  Mobile  Patient Pre-hospital Diet Restrictions:  None  Substance Use History:   Social History     Substance and Sexual Activity   Alcohol Use Never    Frequency: Never     Social History     Tobacco Use   Smoking Status Current Every Day Smoker    Packs/day: 0 25    Years: 10 00    Pack years: 2 50    Types: Cigarettes   Smokeless Tobacco Never Used     Social History     Substance and Sexual Activity   Drug Use Not Currently    Types: Marijuana, Heroin, Fentanyl    Comment: last used today 2/17/2021       Family History:  I have reviewed the patients family history     Physical Exam:     Vitals:   Blood Pressure: 100/58 (02/17/21 0515)  Pulse: 78 (02/17/21 0515)  Temperature: 98 2 °F (36 8 °C) (02/17/21 0515)  Temp Source: Temporal (02/17/21 0515)  Respirations: 20 (02/17/21 0515)  Weight - Scale: 64 kg (141 lb) (02/17/21 0155)  SpO2: 94 % (02/17/21 0515)    Physical Exam  Vitals signs reviewed  Constitutional:       Appearance: Normal appearance  Comments: Sleepy but responds to questions   HENT:      Head: Normocephalic and atraumatic  Nose: Nose normal    Eyes:      General:         Right eye: No discharge  Left eye: No discharge  Extraocular Movements: Extraocular movements intact  Conjunctiva/sclera: Conjunctivae normal    Neck:      Musculoskeletal: Normal range of motion  Cardiovascular:      Comments: RRR per nursing  Pulmonary:      Effort: Pulmonary effort is normal       Comments: Nursing reports lungs decreased with expiratory wheezes  Abdominal:      Comments: Soft, nt +BS per nursing   Skin:     Comments: No wounds noted per nursing   Neurological:      General: No focal deficit present        Mental Status: She is oriented to person, place, and time  Mental status is at baseline  Psychiatric:         Mood and Affect: Mood normal          Behavior: Behavior normal            Additional Data:     Lab Results: I have personally reviewed pertinent reports  Results from last 7 days   Lab Units 02/17/21  0214   WBC Thousand/uL 14 20*   HEMOGLOBIN g/dL 14 8   HEMATOCRIT % 45 1   PLATELETS Thousands/uL 330   NEUTROS PCT % 67*   LYMPHS PCT % 26   MONOS PCT % 6   EOS PCT % 1     Results from last 7 days   Lab Units 02/17/21  0214   SODIUM mmol/L 142   POTASSIUM mmol/L 3 4*   CHLORIDE mmol/L 103   CO2 mmol/L 28   BUN mg/dL 17   CREATININE mg/dL 0 72   ANION GAP mmol/L 11   CALCIUM mg/dL 9 0   ALBUMIN g/dL 4 3   TOTAL BILIRUBIN mg/dL 1 00   ALK PHOS U/L 82   ALT U/L 23   AST U/L 29   GLUCOSE RANDOM mg/dL 100*       Imaging: I have personally reviewed pertinent reports  CT abdomen pelvis with contrast   Final Result by Jarek Cheng MD (02/17 6045)      No bowel obstruction or inflammatory stranding throughout the abdomen or pelvis  Stool noted throughout the colon  Correlate for constipation  Left hydrosalpinx, nonspecific  Correlate with patient symptoms/lab values possible PID  If clinically warranted pelvic ultrasound can be obtained for further evaluation  Workstation performed: [id number]             Epic / Care Everywhere Records Reviewed: Yes    ** Please Note: This note has been constructed using a voice recognition system   **

## 2021-02-17 NOTE — NURSING NOTE
Patient discharge instructions given both written and verbally with details regarding f/u apts and medications  zofran sent onto her pharmacy to   Dressed in street clothes  IV d/c prior to getting dressed  Denies questions  Friend met her in the lobby to take her home

## 2021-02-17 NOTE — ASSESSMENT & PLAN NOTE
· Patient started on Rocephin in the ER  Patient has no symptoms of UTI  U/A showing bacteria  Culture pending will call patient if culture comes out positive

## 2021-02-17 NOTE — ASSESSMENT & PLAN NOTE
· History of heroin use, reported last use yesterday urine drug screen positive for opiates and THC  · Pt admitted using heroin yesterday day prior to admission

## 2021-02-17 NOTE — DISCHARGE SUMMARY
Discharge- Juwan [facility] [birth year], 35 y o  female MRN: [id number]    Unit/Bed#: 7T [facility] 702-02 Encounter: [id number]    Primary Care Provider: Jhonatan Howe MD   Date and time admitted to hospital: 2/17/2021  1:51 AM        Constipation  Assessment & Plan  · Bowel meds  · CT with stool noted throughout the colon    Anxiety  Assessment & Plan  · Patient was given Haldol IV in the ER and will need to be monitored on telemetry secondary to black box warning  · DC tele    Suspected UTI  Assessment & Plan  · Patient started on Rocephin in the ER  Patient has no symptoms of UTI  U/A showing bacteria  Culture pending will call patient if culture comes out positive  Drug abuse ([facility] [address] )  Assessment & Plan  · History of heroin use, reported last use yesterday urine drug screen positive for opiates and THC  · Pt admitted using heroin yesterday day prior to admission    Hypokalemia  Assessment & Plan  · Repleted    Cigarette nicotine dependence without complication  Assessment & Plan  · Nicotine patch 7 mg    Leukocytosis  Patient does not have any evidence of infection did not meet sirs or sepsis on admission  Aj Reece UA did have bacteria however no nitrites  Patient is completely asymptomatic without any evidence of dysuria, no suprapubic abdominal tenderness or pain reported  CT scan showed hydrosalpinx however no discomfort noted on abdominal examination, no symptoms reported  She is monogamous with 1 sexual partner  Patient refused further workup and STD testing  Patient is completely hemodynamically stable, on room air, nontoxic appearing  Leukocytosis likely secondary to dehydration from continues vomiting due to the hyperemesis syndrome  Currently finish intravenous fluid hydration and does have been controlled with Zofran  No further evidence of vomiting  *Cannabinoid hyperemesis syndrome  Assessment & Plan  · Resolved     · Improved with Anti emetics  · Supportive care  · Finished IV fluids  · Cessation has been discussed        Discharging Physician / Practitioner: Cata Alvarado MD  PCP: Aris Landers MD  Admission Date:   Admission Orders (From admission, onward)     Ordered        02/17/21 0406  Place in Observation  Once                   Discharge Date: 02/17/21    Resolved Problems  Date Reviewed: 2/17/2021    None          Consultations During Hospital Stay:  · none    Procedures Performed:   · none    Significant Findings / Test Results:   Ct Abdomen Pelvis With Contrast    Result Date: 2/17/2021  · Impression: No bowel obstruction or inflammatory stranding throughout the abdomen or pelvis  Stool noted throughout the colon  Correlate for constipation  Left hydrosalpinx, nonspecific  Correlate with patient symptoms/lab values possible PID  If clinically warranted pelvic ultrasound can be obtained for further evaluation  Incidental Findings:   · Hydrosalpinx    Test Results Pending at Discharge (will require follow up): · None     Outpatient Tests Requested:  · Pelvic Ultrasound    Complications:  None    Reason for Admission:  N/V    Hospital Course:     Laurence Wagner is a 35 y o  female with past medical history of asthma, polysubstance abuse, who originally presented to the hospital on 2/17/2021 due to nausea and vomiting  Patient has had apart from this admission another ED visit for hyperemesis syndrome secondary to organic will use  She does state that she has been using heroin every day so she does not feel like she is going through withdrawal   On admission vital signs were stable, patient is not meet any sepsis criteria or SIRS  UA did show bacteriuria however no nitrites  Urine culture is pending patient will be notified when results are finalized  Patient was started on intravenous fluids as well as intravenous potassium due to frequent vomiting  Patient was given Zofran  Her diet was advanced as tolerated  Patient states she fell ready to go home she is no longer nauseous    She denies any UTI symptoms such as urinary frequency, burning, dysuria  She also denies any vaginal discharge, PID symptoms  She is monogamous with 1 male partner for a long time  Patient is aware she has a hydrosalpinx and she should follow-up with gynecology for further evaluation  Patient refused further workup such as STD testing  Patient denies any abdominal pain, vaginal discharge or further discomfort the day of discharge  Her epigastric abdominal pain resolved after Mylanta and PPI  Patient was stable the day of discharge  Patient was agreeable to discharge  Please see above list of diagnoses and related plan for additional information  Condition at Discharge: stable     Discharge Day Visit / Exam:     Subjective:  Patient seen and examined, she feels ready to go home , no more nausea  Vitals: Blood Pressure: 109/58 (02/17/21 1521)  Pulse: 82 (02/17/21 1521)  Temperature: 98 1 °F (36 7 °C) (02/17/21 1521)  Temp Source: Temporal (02/17/21 1521)  Respirations: 18 (02/17/21 1521)  Height: 5' 7" (170 2 cm) (02/17/21 0515)  Weight - Scale: 72 kg (158 lb 11 7 oz) (02/17/21 0515)  SpO2: 95 % (02/17/21 1521)  Exam:   Physical Exam  Constitutional:       General: She is not in acute distress  Appearance: Normal appearance  HENT:      Head: Normocephalic and atraumatic  Eyes:      General:         Right eye: No discharge  Left eye: No discharge  Cardiovascular:      Rate and Rhythm: Normal rate and regular rhythm  Pulses: Normal pulses  Heart sounds: No murmur  Pulmonary:      Effort: Pulmonary effort is normal  No respiratory distress  Breath sounds: Normal breath sounds  No wheezing  Abdominal:      General: There is no distension  Tenderness: There is no abdominal tenderness  Musculoskeletal:      Right lower leg: No edema  Left lower leg: No edema  Skin:     General: Skin is warm and dry  Neurological:      General: No focal deficit present  Mental Status: She is alert and oriented to person, place, and time  Mental status is at baseline  Psychiatric:         Mood and Affect: Mood normal        Discussion with Family: none    Discharge instructions/Information to patient and family:   See after visit summary for information provided to patient and family  Provisions for Follow-Up Care:  See after visit summary for information related to follow-up care and any pertinent home health orders  Disposition:     Home        Planned Readmission:  none     Discharge Statement:  I spent 25 minutes discharging the patient  This time was spent on the day of discharge  I had direct contact with the patient on the day of discharge  Greater than 50% of the total time was spent examining patient, answering all patient questions, arranging and discussing plan of care with patient as well as directly providing post-discharge instructions  Additional time then spent on discharge activities  Discharge Medications:  See after visit summary for reconciled discharge medications provided to patient and family        ** Please Note: This note has been constructed using a voice recognition system **

## 2021-02-17 NOTE — ED PROVIDER NOTES
History  Chief Complaint   Patient presents with    Vomiting     pt was seen at [facility] earlier today, went home and slept for 8 hours  denies taking any medications since leaving the hospital earlier      34 yo female with a history of asthma and polysubstance abuse brought to the ED by EMS for evaluation of nausea, vomiting, and abdominal pain  The patient was seen in [address] [place] [address] ED yesterday for similar complaints  She says she went home from the hospital, slept for 8 hours, then woke up vomiting (NBNB) again  (+) Poorly localized abdominal "cramping", worse with vomiting  No fevers/chills  No diarrhea  (+) Flatus  The patient generally uses heroin and marijuana daily but claims she hasn't used either substance in a week  No dark or bloody stools  No hematemesis  She says she was discharged from [facility] with "nausea pills" but she couldn't keep them down  No vaginal bleeding or discharge  No dysuria/hematuria  No pelvic pain  No other specific complaints  Prior to Admission Medications   Prescriptions Last Dose Informant Patient Reported? Taking? albuterol (2 5 mg/3 mL) 0 083 % nebulizer solution   No Yes   Sig: Take 1 vial (2 5 mg total) by nebulization every 6 (six) hours as needed for wheezing or shortness of breath   albuterol (PROVENTIL HFA,VENTOLIN HFA) 90 mcg/act inhaler   No Yes   Sig: Inhale 2 puffs every 6 (six) hours as needed for wheezing   cetirizine (ZyrTEC) 5 MG tablet Not Taking at Unknown time  No No   Sig: Take 1 tablet (5 mg total) by mouth daily   Patient not taking: Reported on 9/14/2020   cloNIDine (CATAPRES) 0 2 mg tablet Not Taking at Unknown time  No No   Sig: Take 0 5 tablets (0 1 mg total) by mouth 3 (three) times a day as needed for high blood pressure (heroin withdrawal)   Patient not taking: Reported on 9/14/2020   fluticasone (FLOVENT HFA) 44 mcg/act inhaler Not Taking at Unknown time  No No   Sig: Inhale 2 puffs 2 (two) times a day Rinse mouth after use  Patient not taking: Reported on 9/14/2020   ibuprofen (MOTRIN) 600 mg tablet   No No   Sig: Take 1 tablet (600 mg total) by mouth every 8 (eight) hours for 10 days   ondansetron (ZOFRAN) 4 mg tablet   No Yes   Sig: Take 1 tablet (4 mg total) by mouth every 6 (six) hours   ondansetron (ZOFRAN-ODT) 4 mg disintegrating tablet Not Taking at Unknown time  No No   Sig: Take 1 tablet (4 mg total) by mouth every 8 (eight) hours as needed for nausea or vomiting   Patient not taking: Reported on 9/14/2020   predniSONE 10 mg tablet   No Yes   Sig: Six tablets day 1; 5 tablets day to; 4 tablets day 3; 3 tablets day 4; 2 tablets day 5; and 1 tablet day 6      Facility-Administered Medications: None       Past Medical History:   Diagnosis Date    Asthma     [facility]        History reviewed  No pertinent surgical history  Family History   Problem Relation Age of Onset    Throat cancer Maternal Grandfather     Asthma Family      I have reviewed and agree with the history as documented  E-Cigarette/Vaping    E-Cigarette Use Never User      E-Cigarette/Vaping Substances     Social History     Tobacco Use    Smoking status: Current Every Day Smoker     Packs/day: 0 25     Years: 10 00     Pack years: 2 50     Types: Cigarettes    Smokeless tobacco: Never Used   Substance Use Topics    Alcohol use: Never     Frequency: Never    Drug use: Not Currently     Types: Marijuana, Heroin, Fentanyl       Review of Systems   Constitutional: Negative for chills and fever  HENT: Negative for sore throat  Eyes: Negative for visual disturbance  Respiratory: Negative for shortness of breath  Cardiovascular: Negative for chest pain  Gastrointestinal: Positive for abdominal pain, nausea and vomiting  Negative for diarrhea  Endocrine: Negative for cold intolerance and heat intolerance  Genitourinary: Negative for dysuria, flank pain, frequency and vaginal discharge  Musculoskeletal: Negative for back pain  Skin: Negative for rash  Allergic/Immunologic: Negative for immunocompromised state  Neurological: Negative for weakness and numbness  Hematological: Negative for adenopathy  Psychiatric/Behavioral: Negative for self-injury  Physical Exam  Physical Exam  Constitutional:       General: She is in acute distress  Appearance: She is well-developed  HENT:      Head: Normocephalic and atraumatic  Eyes:      Pupils: Pupils are equal, round, and reactive to light  Neck:      Musculoskeletal: Normal range of motion and neck supple  Cardiovascular:      Rate and Rhythm: Normal rate and regular rhythm  Pulmonary:      Effort: Pulmonary effort is normal       Breath sounds: Normal breath sounds  Abdominal:      General: There is no distension  Palpations: Abdomen is soft  Tenderness: There is generalized abdominal tenderness  Musculoskeletal: Normal range of motion  Skin:     General: Skin is warm and dry  Neurological:      Mental Status: She is alert and oriented to person, place, and time  Psychiatric:         Mood and Affect: Mood is anxious  Behavior: Behavior is agitated           Vital Signs  ED Triage Vitals [02/17/21 0155]   Temperature Pulse Respirations Blood Pressure SpO2   97 9 °F (36 6 °C) 88 16 (!) 107/49 98 %      Temp Source Heart Rate Source Patient Position - Orthostatic VS BP Location FiO2 (%)   Tympanic Monitor Lying Left arm --      Pain Score       8           Vitals:    02/17/21 0330 02/17/21 0400 02/17/21 0430 02/17/21 0515   BP: (!) 95/49 (!) 99/47 110/53 100/58   Pulse: 74 76 80 78   Patient Position - Orthostatic VS: Lying Lying Lying Lying         Visual Acuity      ED Medications  Medications   acetaminophen (TYLENOL) tablet 650 mg (has no administration in time range)   ondansetron (ZOFRAN) injection 4 mg (has no administration in time range)   nicotine (NICODERM CQ) 7 mg/24hr TD 24 hr patch 1 patch (has no administration in time range) sodium chloride 0 9 % with KCl 40 mEq/L infusion (premix) (has no administration in time range)   prochlorperazine (COMPAZINE) tablet 5 mg (has no administration in time range)   sodium chloride 0 9 % bolus 1,000 mL (1,000 mL Intravenous New Bag 2/17/21 0206)   haloperidol lactate (HALDOL) injection 5 mg (5 mg Intramuscular Given 2/17/21 0206)   ondansetron (ZOFRAN) injection 4 mg (4 mg Intravenous Given 2/17/21 0206)   LORazepam (ATIVAN) injection 2 mg (2 mg Intravenous Given 2/17/21 0215)   iohexol (OMNIPAQUE) 350 MG/ML injection (SINGLE-DOSE) 100 mL (100 mL Intravenous Given 2/17/21 0247)   cefTRIAXone (ROCEPHIN) IVPB (premix in dextrose) 1,000 mg 50 mL (1,000 mg Intravenous New Bag 2/17/21 0435)       Diagnostic Studies  Results Reviewed     Procedure Component Value Units Date/Time    Rapid drug screen, urine [[id number]]  (Abnormal) Collected: 02/17/21 0240    Lab Status: Final result Specimen: Urine, Clean Catch Updated: 02/17/21 0311     Amph/Meth UR Negative     Barbiturate Ur Negative     Benzodiazepine Urine Negative     Cocaine Urine Negative     Methadone Urine Negative     Opiate Urine Positive     PCP Ur Negative     THC Urine Positive     Oxycodone Urine Negative    Narrative:      Presumptive report  If requested, specimen will be sent to reference lab for confirmation  FOR MEDICAL PURPOSES ONLY  IF CONFIRMATION NEEDED PLEASE CONTACT THE LAB WITHIN 5 DAYS      Drug Screen Cutoff Levels:  AMPHETAMINE/METHAMPHETAMINES  1000 ng/mL  BARBITURATES     200 ng/mL  BENZODIAZEPINES     200 ng/mL  COCAINE      300 ng/mL  METHADONE      300 ng/mL  OPIATES      300 ng/mL  PHENCYCLIDINE     25 ng/mL  THC       50 ng/mL  OXYCODONE      100 ng/mL    Urine Microscopic [[id number]]  (Abnormal) Collected: 02/17/21 0240    Lab Status: Final result Specimen: Urine, Clean Catch Updated: 02/17/21 0252     RBC, UA None Seen /hpf      WBC, UA 4-10 /hpf      Epithelial Cells Moderate /hpf      Bacteria, UA Moderate /hpf MUCUS THREADS Occasional    UA (URINE) with reflex to Scope [[id number]]  (Abnormal) Collected: 02/17/21 0240    Lab Status: Final result Specimen: Urine, Clean Catch Updated: 02/17/21 0252     Color, UA Milly     Clarity, UA Slightly Cloudy     Specific Gravity, UA 1 025     pH, UA 6 0     Leukocytes, UA 25 0     Nitrite, UA Negative     Protein, UA 15 (Trace) mg/dl      Glucose, UA Negative mg/dl      Ketones, UA >=150 (3+) mg/dl      Bilirubin, UA Negative     Blood, UA 10 0     UROBILINOGEN UA Negative mg/dL     POCT pregnancy, urine [[id number]]  (Normal) Resulted: 02/17/21 0204    Lab Status: Final result Updated: 02/17/21 0242     EXT PREG TEST UR (Ref: Negative) negative     Control valid    Lipase [[id number]]  (Normal) Collected: 02/17/21 0214    Lab Status: Final result Specimen: Blood from Arm, Left Updated: 02/17/21 0231     Lipase 48 u/L     Comprehensive metabolic panel [[id number]]  (Abnormal) Collected: 02/17/21 0214    Lab Status: Final result Specimen: Blood from Arm, Left Updated: 02/17/21 0231     Sodium 142 mmol/L      Potassium 3 4 mmol/L      Chloride 103 mmol/L      CO2 28 mmol/L      ANION GAP 11 mmol/L      BUN 17 mg/dL      Creatinine 0 72 mg/dL      Glucose 100 mg/dL      Calcium 9 0 mg/dL      AST 29 U/L      ALT 23 U/L      Alkaline Phosphatase 82 U/L      Total Protein 8 0 g/dL      Albumin 4 3 g/dL      Total Bilirubin 1 00 mg/dL      eGFR 114 ml/min/1 73sq m     Narrative:      Pina guidelines for Chronic Kidney Disease (CKD):     Stage 1 with normal or high GFR (GFR > 90 mL/min/1 73 square meters)    Stage 2 Mild CKD (GFR = 60-89 mL/min/1 73 square meters)    Stage 3A Moderate CKD (GFR = 45-59 mL/min/1 73 square meters)    Stage 3B Moderate CKD (GFR = 30-44 mL/min/1 73 square meters)    Stage 4 Severe CKD (GFR = 15-29 mL/min/1 73 square meters)    Stage 5 End Stage CKD (GFR <15 mL/min/1 73 square meters)  Note: GFR calculation is accurate only with a steady state creatinine    CBC and differential [[id number]]  (Abnormal) Collected: 02/17/21 0214    Lab Status: Final result Specimen: Blood from Arm, Left Updated: 02/17/21 0222     WBC 14 20 Thousand/uL      RBC 5 23 Million/uL      Hemoglobin 14 8 g/dL      Hematocrit 45 1 %      MCV 86 fL      MCH 28 3 pg      MCHC 32 8 g/dL      RDW 13 0 %      MPV 7 9 fL      Platelets 495 Thousands/uL      Neutrophils Relative 67 %      Lymphocytes Relative 26 %      Monocytes Relative 6 %      Eosinophils Relative 1 %      Basophils Relative 1 %      Neutrophils Absolute 9 50 Thousands/µL      Lymphocytes Absolute 3 80 Thousands/µL      Monocytes Absolute 0 80 Thousand/µL      Eosinophils Absolute 0 10 Thousand/µL      Basophils Absolute 0 10 Thousands/µL                  CT abdomen pelvis with contrast   Final Result by Freedom Alegria MD (02/17 3642)      No bowel obstruction or inflammatory stranding throughout the abdomen or pelvis  Stool noted throughout the colon  Correlate for constipation  Left hydrosalpinx, nonspecific  Correlate with patient symptoms/lab values possible PID  If clinically warranted pelvic ultrasound can be obtained for further evaluation  Workstation performed: [id number]                    Procedures  Procedures         ED Course  ED Course as of Feb 17 0528   Wed Feb 17, 2021   7907 CT abdomen pelvis with contrast                             SBIRT 20yo+      Most Recent Value   SBIRT (23 yo +)   In order to provide better care to our patients, we are screening all of our patients for alcohol and drug use  Would it be okay to ask you these screening questions? No Filed at: 02/17/2021 0234                    MDM  Number of Diagnoses or Management Options  Abdominal pain:   Nausea and vomiting:   Diagnosis management comments: The patient is tearful, agitated, and actively vomiting on arrival  Unclear etiology of her symptoms   Substance abuse/withdrawal vs an acute intraadbominal issue? Will repeat basic labs and obtain advanced imaging  IVFs/Zofran/Haldol administered, will continue to monitor in the ED     03:50 Workup is remarkable for leuks/bacteria on UA, a mild leukocytosis, and ketones  CT as above --> the patient denies pelvic pain and vaginal discharge  Low clinical suspicion for PID  Will treat for a possible UTI  Haldol mistakenly administered IV instead of IM (as ordered)  Symptoms much improved but not entirely resolved  Will place in the Observation Unit for symptom management and continued monitoring  Bed requested  Amount and/or Complexity of Data Reviewed  Clinical lab tests: ordered and reviewed  Tests in the radiology section of CPT®: reviewed  Tests in the medicine section of CPT®: ordered and reviewed    Patient Progress  Patient progress: improved      Disposition  Final diagnoses:   Nausea and vomiting   Abdominal pain     Time reflects when diagnosis was documented in both MDM as applicable and the Disposition within this note     Time User Action Codes Description Comment    2/17/2021  4:04 AM Thera Debora Add [R11 2] Nausea and vomiting     2/17/2021  4:04 AM Lucas Cazares Add [R10 9] Abdominal pain       ED Disposition     ED Disposition Condition Date/Time Comment    Admit Stable Wed Feb 17, 2021  4:04 AM Case was discussed with TEJAL and the patient's admission status was agreed to be Admission Status: observation status to the service of Dr Carly Camarillo          Follow-up Information    None         Current Discharge Medication List      CONTINUE these medications which have NOT CHANGED    Details   albuterol (2 5 mg/3 mL) 0 083 % nebulizer solution Take 1 vial (2 5 mg total) by nebulization every 6 (six) hours as needed for wheezing or shortness of breath  Qty: 30 vial, Refills: 0    Associated Diagnoses: Asthma, unspecified asthma severity, unspecified whether complicated, unspecified whether persistent      albuterol (PROVENTIL HFA,VENTOLIN HFA) 90 mcg/act inhaler Inhale 2 puffs every 6 (six) hours as needed for wheezing  Qty: 1 Inhaler, Refills: 0    Associated Diagnoses: Asthma, unspecified asthma severity, unspecified whether complicated, unspecified whether persistent      ondansetron (ZOFRAN) 4 mg tablet Take 1 tablet (4 mg total) by mouth every 6 (six) hours  Qty: 12 tablet, Refills: 0    Associated Diagnoses: Cannabinoid hyperemesis syndrome      predniSONE 10 mg tablet Six tablets day 1; 5 tablets day to; 4 tablets day 3; 3 tablets day 4; 2 tablets day 5; and 1 tablet day 6  Qty: 21 tablet, Refills: 0    Associated Diagnoses: Asthma, unspecified asthma severity, unspecified whether complicated, unspecified whether persistent      cetirizine (ZyrTEC) 5 MG tablet Take 1 tablet (5 mg total) by mouth daily  Qty: 30 tablet, Refills: 0    Associated Diagnoses: Bronchospasm      cloNIDine (CATAPRES) 0 2 mg tablet Take 0 5 tablets (0 1 mg total) by mouth 3 (three) times a day as needed for high blood pressure (heroin withdrawal)  Qty: 3 tablet, Refills: 0    Associated Diagnoses: Heroin withdrawal (HCC)      fluticasone (FLOVENT HFA) 44 mcg/act inhaler Inhale 2 puffs 2 (two) times a day Rinse mouth after use  Qty: 1 Inhaler, Refills: 0    Associated Diagnoses: Bronchospasm      ibuprofen (MOTRIN) 600 mg tablet Take 1 tablet (600 mg total) by mouth every 8 (eight) hours for 10 days  Qty: 30 tablet, Refills: 0    Associated Diagnoses: Dental infection      ondansetron (ZOFRAN-ODT) 4 mg disintegrating tablet Take 1 tablet (4 mg total) by mouth every 8 (eight) hours as needed for nausea or vomiting  Qty: 9 tablet, Refills: 0    Associated Diagnoses: Heroin withdrawal ([facility] [address] )           No discharge procedures on file      PDMP Review     None          ED Provider  Electronically Signed by           Shauna Shepard MD  02/17/21 3997

## 2021-02-17 NOTE — DISCHARGE INSTRUCTIONS
Acute Nausea and Vomiting   WHAT YOU NEED TO KNOW:   Acute nausea and vomiting start suddenly, worsen quickly, and last a short time  DISCHARGE INSTRUCTIONS:   Seek care immediately if:   · You see blood in your vomit or your bowel movements  · You have sudden, severe pain in your chest and upper abdomen after hard vomiting or retching  · You have swelling in your neck and chest      · You are dizzy, cold, and thirsty and your eyes and mouth are dry  · You are urinating very little or not at all  · You have muscle weakness, leg cramps, and trouble breathing  · Your heart is beating much faster than normal      · You continue to vomit for more than 48 hours  Contact your healthcare provider if:   · You have frequent dry heaves (vomiting but nothing comes out)  · Your nausea and vomiting does not get better or go away after you use medicine  · You have questions or concerns about your condition or treatment  Medicines: You may need any of the following:  · Medicines  may be given to calm your stomach and stop your vomiting  You may also need medicines to help you feel more relaxed or to stop nausea and vomiting caused by motion sickness  · Gastrointestinal stimulants  are used to help empty your stomach and bowels  This may help decrease nausea and vomiting  · Take your medicine as directed  Contact your healthcare provider if you think your medicine is not helping or if you have side effects  Tell him or her if you are allergic to any medicine  Keep a list of the medicines, vitamins, and herbs you take  Include the amounts, and when and why you take them  Bring the list or the pill bottles to follow-up visits  Carry your medicine list with you in case of an emergency  Prevent or manage acute nausea and vomiting:   · Do not drink alcohol  Alcohol may upset or irritate your stomach  Too much alcohol can also cause acute nausea and vomiting  · Control stress    Headaches due to stress may cause nausea and vomiting  Find ways to relax and manage your stress  Get more rest and sleep  · Drink more liquids as directed  Vomiting can lead to dehydration  It is important to drink more liquids to help replace lost body fluids  Ask your healthcare provider how much liquid to drink each day and which liquids are best for you  Your provider may recommend that you drink an oral rehydration solution (ORS)  ORS contains water, salts, and sugar that are needed to replace the lost body fluids  Ask what kind of ORS to use, how much to drink, and where to get it  · Eat smaller meals, more often  Eat small amounts of food every 2 to 3 hours, even if you are not hungry  Food in your stomach may decrease your nausea  · Talk to your healthcare provider before you take over-the-counter (OTC) medicines  These medicines can cause serious problems if you use certain other medicines, or you have a medical condition  You may have problems if you use too much or use them for longer than the label says  Follow directions on the label carefully  Follow up with your healthcare provider as directed:  Write down your questions so you remember to ask them during your visits  © Copyright [address] [place] [address] Information is for End User's use only and may not be sold, redistributed or otherwise used for commercial purposes  All illustrations and images included in CareNotes® are the copyrighted property of [employer]  or [facility]   The above information is an  only  It is not intended as medical advice for individual conditions or treatments  Talk to your doctor, nurse or pharmacist before following any medical regimen to see if it is safe and effective for you

## 2021-02-17 NOTE — ED NOTES
Pt placed on continual cardiac, BP, and SPO2 monitoring  Alarms are set and audible  This nurse to continue monitoring        Joni Kumari RN  02/17/21 8847

## 2021-02-18 ENCOUNTER — HOSPITAL ENCOUNTER (EMERGENCY)
Facility: HOSPITAL | Age: 31
Discharge: HOME | End: 2021-02-18
Attending: EMERGENCY MEDICINE
Payer: COMMERCIAL

## 2021-02-18 VITALS
RESPIRATION RATE: 16 BRPM | WEIGHT: 167.55 LBS | DIASTOLIC BLOOD PRESSURE: 82 MMHG | HEART RATE: 68 BPM | BODY MASS INDEX: 26.24 KG/M2 | OXYGEN SATURATION: 98 % | TEMPERATURE: 98.8 F | SYSTOLIC BLOOD PRESSURE: 135 MMHG

## 2021-02-18 VITALS
WEIGHT: 158.73 LBS | DIASTOLIC BLOOD PRESSURE: 77 MMHG | SYSTOLIC BLOOD PRESSURE: 133 MMHG | RESPIRATION RATE: 18 BRPM | HEART RATE: 77 BPM | TEMPERATURE: 98.3 F | OXYGEN SATURATION: 96 % | BODY MASS INDEX: 24.86 KG/M2

## 2021-02-18 DIAGNOSIS — R11.2 NAUSEA AND VOMITING: Primary | ICD-10-CM

## 2021-02-18 DIAGNOSIS — R11.2 CANNABINOID HYPEREMESIS SYNDROME: Primary | ICD-10-CM

## 2021-02-18 DIAGNOSIS — K59.00 CONSTIPATION: ICD-10-CM

## 2021-02-18 DIAGNOSIS — R10.13 EPIGASTRIC ABDOMINAL PAIN: ICD-10-CM

## 2021-02-18 DIAGNOSIS — F12.90 CANNABINOID HYPEREMESIS SYNDROME: Primary | ICD-10-CM

## 2021-02-18 LAB
ALBUMIN SERPL BCP-MCNC: 4.1 G/DL (ref 3–5.2)
ALP SERPL-CCNC: 76 U/L (ref 43–122)
ALT SERPL W P-5'-P-CCNC: 33 U/L (ref 9–52)
ANION GAP SERPL CALCULATED.3IONS-SCNC: 7 MMOL/L (ref 5–14)
AST SERPL W P-5'-P-CCNC: 27 U/L (ref 14–36)
BACTERIA UR CULT: NORMAL
BACTERIA UR QL AUTO: ABNORMAL /HPF
BACTERIA UR QL AUTO: ABNORMAL /HPF
BASOPHILS # BLD AUTO: 0.1 THOUSANDS/ΜL (ref 0–0.1)
BASOPHILS NFR BLD AUTO: 1 % (ref 0–1)
BILIRUB SERPL-MCNC: 1 MG/DL
BILIRUB UR QL STRIP: NEGATIVE
BILIRUB UR QL STRIP: NEGATIVE
BUN SERPL-MCNC: 12 MG/DL (ref 5–25)
CALCIUM SERPL-MCNC: 8.8 MG/DL (ref 8.4–10.2)
CHLORIDE SERPL-SCNC: 107 MMOL/L (ref 97–108)
CLARITY UR: ABNORMAL
CLARITY UR: ABNORMAL
CO2 SERPL-SCNC: 27 MMOL/L (ref 22–30)
COLOR UR: ABNORMAL
COLOR UR: YELLOW
COLOR, POC: YELLOW
CREAT SERPL-MCNC: 0.7 MG/DL (ref 0.6–1.2)
EOSINOPHIL # BLD AUTO: 0.1 THOUSAND/ΜL (ref 0–0.4)
EOSINOPHIL NFR BLD AUTO: 1 % (ref 0–6)
ERYTHROCYTE [DISTWIDTH] IN BLOOD BY AUTOMATED COUNT: 12.6 %
EXT PREG TEST URINE: NORMAL
EXT. CONTROL ED NAV: NORMAL
GFR SERPL CREATININE-BSD FRML MDRD: 117 ML/MIN/1.73SQ M
GLUCOSE SERPL-MCNC: 100 MG/DL (ref 70–99)
GLUCOSE UR STRIP-MCNC: NEGATIVE MG/DL
GLUCOSE UR STRIP-MCNC: NEGATIVE MG/DL
HCT VFR BLD AUTO: 43.9 % (ref 36–46)
HGB BLD-MCNC: 14.5 G/DL (ref 12–16)
HGB UR QL STRIP.AUTO: 50
HGB UR QL STRIP.AUTO: ABNORMAL
KETONES UR STRIP-MCNC: ABNORMAL MG/DL
KETONES UR STRIP-MCNC: ABNORMAL MG/DL
LEUKOCYTE ESTERASE UR QL STRIP: 25
LEUKOCYTE ESTERASE UR QL STRIP: NEGATIVE
LYMPHOCYTES # BLD AUTO: 2.4 THOUSANDS/ΜL (ref 0.5–4)
LYMPHOCYTES NFR BLD AUTO: 24 % (ref 25–45)
MCH RBC QN AUTO: 28.5 PG (ref 26–34)
MCHC RBC AUTO-ENTMCNC: 33 G/DL (ref 31–36)
MCV RBC AUTO: 86 FL (ref 80–100)
MONOCYTES # BLD AUTO: 0.4 THOUSAND/ΜL (ref 0.2–0.9)
MONOCYTES NFR BLD AUTO: 4 % (ref 1–10)
NEUTROPHILS # BLD AUTO: 6.9 THOUSANDS/ΜL (ref 1.8–7.8)
NEUTS SEG NFR BLD AUTO: 69 % (ref 45–65)
NITRITE UR QL STRIP: NEGATIVE
NITRITE UR QL STRIP: NEGATIVE
NON-SQ EPI CELLS URNS QL MICRO: ABNORMAL /HPF
NON-SQ EPI CELLS URNS QL MICRO: ABNORMAL /HPF
PH UR STRIP.AUTO: 5 [PH]
PH UR STRIP.AUTO: 6 [PH] (ref 4.5–8)
PLATELET # BLD AUTO: 310 THOUSANDS/UL (ref 150–450)
PMV BLD AUTO: 7.9 FL (ref 8.9–12.7)
POTASSIUM SERPL-SCNC: 3.7 MMOL/L (ref 3.6–5)
PROT SERPL-MCNC: 7.4 G/DL (ref 5.9–8.4)
PROT UR STRIP-MCNC: ABNORMAL MG/DL
PROT UR STRIP-MCNC: NEGATIVE MG/DL
RBC # BLD AUTO: 5.08 MILLION/UL (ref 4–5.2)
RBC #/AREA URNS AUTO: ABNORMAL /HPF
RBC #/AREA URNS AUTO: ABNORMAL /HPF
SARS-COV-2 RNA RESP QL NAA+PROBE: NEGATIVE
SODIUM SERPL-SCNC: 141 MMOL/L (ref 137–147)
SP GR UR STRIP.AUTO: 1.02 (ref 1–1.04)
SP GR UR STRIP.AUTO: >=1.03 (ref 1–1.03)
UROBILINOGEN UA: NEGATIVE MG/DL
UROBILINOGEN UR QL STRIP.AUTO: 0.2 E.U./DL
WBC # BLD AUTO: 10 THOUSAND/UL (ref 4.5–11)
WBC #/AREA URNS AUTO: ABNORMAL /HPF
WBC #/AREA URNS AUTO: ABNORMAL /HPF

## 2021-02-18 PROCEDURE — 96361 HYDRATE IV INFUSION ADD-ON: CPT

## 2021-02-18 PROCEDURE — 96374 THER/PROPH/DIAG INJ IV PUSH: CPT

## 2021-02-18 PROCEDURE — 81001 URINALYSIS AUTO W/SCOPE: CPT | Performed by: EMERGENCY MEDICINE

## 2021-02-18 PROCEDURE — 81025 URINE PREGNANCY TEST: CPT | Performed by: EMERGENCY MEDICINE

## 2021-02-18 PROCEDURE — 85025 COMPLETE CBC W/AUTO DIFF WBC: CPT | Performed by: EMERGENCY MEDICINE

## 2021-02-18 PROCEDURE — 99284 EMERGENCY DEPT VISIT MOD MDM: CPT | Performed by: PHYSICIAN ASSISTANT

## 2021-02-18 PROCEDURE — 96375 TX/PRO/DX INJ NEW DRUG ADDON: CPT

## 2021-02-18 PROCEDURE — 99284 EMERGENCY DEPT VISIT MOD MDM: CPT

## 2021-02-18 PROCEDURE — 99284 EMERGENCY DEPT VISIT MOD MDM: CPT | Performed by: EMERGENCY MEDICINE

## 2021-02-18 PROCEDURE — 81001 URINALYSIS AUTO W/SCOPE: CPT

## 2021-02-18 PROCEDURE — 36415 COLL VENOUS BLD VENIPUNCTURE: CPT | Performed by: EMERGENCY MEDICINE

## 2021-02-18 PROCEDURE — 87635 SARS-COV-2 COVID-19 AMP PRB: CPT | Performed by: PHYSICIAN ASSISTANT

## 2021-02-18 PROCEDURE — 96372 THER/PROPH/DIAG INJ SC/IM: CPT

## 2021-02-18 PROCEDURE — 80053 COMPREHEN METABOLIC PANEL: CPT | Performed by: EMERGENCY MEDICINE

## 2021-02-18 RX ORDER — MAGNESIUM HYDROXIDE/ALUMINUM HYDROXICE/SIMETHICONE 120; 1200; 1200 MG/30ML; MG/30ML; MG/30ML
20 SUSPENSION ORAL ONCE
Status: COMPLETED | OUTPATIENT
Start: 2021-02-18 | End: 2021-02-18

## 2021-02-18 RX ORDER — DOCUSATE SODIUM 100 MG/1
100 CAPSULE, LIQUID FILLED ORAL EVERY 12 HOURS
Qty: 60 CAPSULE | Refills: 0 | Status: SHIPPED | OUTPATIENT
Start: 2021-02-18 | End: 2021-02-18 | Stop reason: SDUPTHER

## 2021-02-18 RX ORDER — ONDANSETRON 4 MG/1
4 TABLET, FILM COATED ORAL EVERY 6 HOURS
Qty: 12 TABLET | Refills: 0 | Status: SHIPPED | OUTPATIENT
Start: 2021-02-18

## 2021-02-18 RX ORDER — ONDANSETRON 4 MG/1
4 TABLET, ORALLY DISINTEGRATING ORAL EVERY 6 HOURS PRN
Qty: 20 TABLET | Refills: 0 | Status: SHIPPED | OUTPATIENT
Start: 2021-02-18

## 2021-02-18 RX ORDER — HALOPERIDOL 5 MG/ML
5 INJECTION INTRAMUSCULAR ONCE
Status: COMPLETED | OUTPATIENT
Start: 2021-02-18 | End: 2021-02-18

## 2021-02-18 RX ORDER — SUCRALFATE 1 G/1
1 TABLET ORAL 4 TIMES DAILY
Qty: 40 TABLET | Refills: 0 | Status: SHIPPED | OUTPATIENT
Start: 2021-02-18 | End: 2021-02-28

## 2021-02-18 RX ORDER — ALUMINA, MAGNESIA, AND SIMETHICONE 2400; 2400; 240 MG/30ML; MG/30ML; MG/30ML
10 SUSPENSION ORAL EVERY 6 HOURS PRN
Qty: 355 ML | Refills: 0 | Status: SHIPPED | OUTPATIENT
Start: 2021-02-18

## 2021-02-18 RX ORDER — KETOROLAC TROMETHAMINE 30 MG/ML
15 INJECTION, SOLUTION INTRAMUSCULAR; INTRAVENOUS ONCE
Status: COMPLETED | OUTPATIENT
Start: 2021-02-18 | End: 2021-02-18

## 2021-02-18 RX ORDER — DOCUSATE SODIUM 100 MG/1
100 CAPSULE, LIQUID FILLED ORAL EVERY 12 HOURS
Qty: 60 CAPSULE | Refills: 0 | Status: SHIPPED | OUTPATIENT
Start: 2021-02-18

## 2021-02-18 RX ORDER — KETOROLAC TROMETHAMINE 30 MG/ML
15 INJECTION, SOLUTION INTRAMUSCULAR; INTRAVENOUS ONCE
Status: DISCONTINUED | OUTPATIENT
Start: 2021-02-18 | End: 2021-02-18

## 2021-02-18 RX ORDER — ONDANSETRON 2 MG/ML
1 INJECTION INTRAMUSCULAR; INTRAVENOUS ONCE
Status: COMPLETED | OUTPATIENT
Start: 2021-02-18 | End: 2021-02-18

## 2021-02-18 RX ORDER — ONDANSETRON 4 MG/1
4 TABLET, FILM COATED ORAL EVERY 6 HOURS
Qty: 12 TABLET | Refills: 0 | Status: SHIPPED | OUTPATIENT
Start: 2021-02-18 | End: 2021-02-18 | Stop reason: SDUPTHER

## 2021-02-18 RX ORDER — ONDANSETRON 2 MG/ML
4 INJECTION INTRAMUSCULAR; INTRAVENOUS ONCE
Status: COMPLETED | OUTPATIENT
Start: 2021-02-18 | End: 2021-02-18

## 2021-02-18 RX ADMIN — ONDANSETRON 4 MG: 2 INJECTION INTRAMUSCULAR; INTRAVENOUS at 19:15

## 2021-02-18 RX ADMIN — HALOPERIDOL LACTATE 5 MG: 5 INJECTION, SOLUTION INTRAMUSCULAR at 11:42

## 2021-02-18 RX ADMIN — FAMOTIDINE 40 MG: 10 INJECTION INTRAVENOUS at 11:41

## 2021-02-18 RX ADMIN — KETOROLAC TROMETHAMINE 15 MG: 30 INJECTION, SOLUTION INTRAMUSCULAR at 17:15

## 2021-02-18 RX ADMIN — SODIUM CHLORIDE 1000 ML: 0.9 INJECTION, SOLUTION INTRAVENOUS at 13:14

## 2021-02-18 RX ADMIN — SODIUM CHLORIDE 1000 ML: 0.9 INJECTION, SOLUTION INTRAVENOUS at 11:41

## 2021-02-18 RX ADMIN — SODIUM CHLORIDE 1000 ML: 0.9 INJECTION, SOLUTION INTRAVENOUS at 17:19

## 2021-02-18 RX ADMIN — HALOPERIDOL LACTATE 5 MG: 5 INJECTION, SOLUTION INTRAMUSCULAR at 17:17

## 2021-02-18 RX ADMIN — ALUMINUM HYDROXIDE, MAGNESIUM HYDROXIDE, AND SIMETHICONE 20 ML: 200; 200; 20 SUSPENSION ORAL at 19:15

## 2021-02-18 NOTE — ED NOTES
Pt sleeping   Boyfriend has called twice to speak with her       Tio Menjivar, KARYNA  02/18/21 9735

## 2021-02-18 NOTE — ED NOTES
Pt actively vomiting RN at bedside to place IV pt screams very loud and say "Bitch" RN asks pt was that necessary pt state "yes it hurt that bad"        Elian Pedro, RN  02/18/21 8422

## 2021-02-18 NOTE — ED NOTES
Pt understands that she needs to stop drugs and needs to rest       Marilu Santo, KARYNA  02/18/21 5795

## 2021-02-18 NOTE — ED PROVIDER NOTES
History  Chief Complaint   Patient presents with    Abdominal Pain     N/V/D pt was seen multiple times for same feels no better  last use heroin this morning "snorted"     Patient is a 28-year-old female who presents for continued abdominal pain  Patient is on her 3rd ER visit and was recently discharged from inpatient hospitalization for similar symptoms  Patient states he felt fine going home, then this morning had return of her abdominal pain associated with 3 episodes of nonbloody nonbilious vomiting  No associated diarrhea  She states she has never had previous abdominal surgeries  Medical records shows she had a complete workup including blood work, IV hydration, urinalysis, a negative urine pregnancy test   A CT scan that showed a large amount of stool but no signs of obstruction  Patient states she still passing gas  No fevers or chills, no concerns for coronavirus exposure  Prior to Admission Medications   Prescriptions Last Dose Informant Patient Reported? Taking? albuterol (2 5 mg/3 mL) 0 083 % nebulizer solution   No No   Sig: Take 1 vial (2 5 mg total) by nebulization every 6 (six) hours as needed for wheezing or shortness of breath   albuterol (PROVENTIL HFA,VENTOLIN HFA) 90 mcg/act inhaler   No No   Sig: Inhale 2 puffs every 6 (six) hours as needed for wheezing   ondansetron (ZOFRAN) 4 mg tablet   No No   Sig: Take 1 tablet (4 mg total) by mouth every 6 (six) hours   ondansetron (ZOFRAN-ODT) 4 mg disintegrating tablet   No No   Sig: Take 1 tablet (4 mg total) by mouth every 6 (six) hours as needed for nausea or vomiting      Facility-Administered Medications: None       Past Medical History:   Diagnosis Date    Asthma     Heroin abuse ([facility] [address] )        History reviewed  No pertinent surgical history      Family History   Problem Relation Age of Onset    Throat cancer Maternal Grandfather     Asthma Family     Cancer Mother     No Known Problems Father      I have reviewed and agree with the history as documented  E-Cigarette/Vaping    E-Cigarette Use Never User      E-Cigarette/Vaping Substances     Social History     Tobacco Use    Smoking status: Current Every Day Smoker     Packs/day: 0 25     Years: 10 00     Pack years: 2 50     Types: Cigarettes    Smokeless tobacco: Never Used   Substance Use Topics    Alcohol use: Never     Frequency: Never    Drug use: Not Currently     Types: Marijuana, Heroin, Fentanyl     Comment: last used today 2/17/2021       Review of Systems   Constitutional: Negative  Negative for chills and fever  HENT: Negative  Negative for rhinorrhea, sore throat, trouble swallowing and voice change  Eyes: Negative  Negative for pain and visual disturbance  Respiratory: Negative  Negative for cough, shortness of breath and wheezing  Cardiovascular: Negative  Negative for chest pain and palpitations  Gastrointestinal: Positive for abdominal pain, nausea and vomiting  Negative for diarrhea  Genitourinary: Negative  Negative for dysuria and frequency  Musculoskeletal: Negative  Negative for neck pain and neck stiffness  Skin: Negative  Negative for rash  Neurological: Negative  Negative for dizziness, speech difficulty, weakness, light-headedness and numbness  Physical Exam  Physical Exam  Vitals signs and nursing note reviewed  Constitutional:       General: She is not in acute distress  Appearance: She is well-developed  HENT:      Head: Normocephalic and atraumatic  Eyes:      Conjunctiva/sclera: Conjunctivae normal       Pupils: Pupils are equal, round, and reactive to light  Neck:      Musculoskeletal: Normal range of motion and neck supple  Trachea: No tracheal deviation  Cardiovascular:      Rate and Rhythm: Normal rate and regular rhythm  Pulmonary:      Effort: Pulmonary effort is normal  No respiratory distress  Breath sounds: Normal breath sounds  No wheezing or rales     Abdominal:      General: Bowel sounds are normal  There is no distension  Palpations: Abdomen is soft  Tenderness: There is generalized abdominal tenderness  There is no guarding or rebound  Musculoskeletal: Normal range of motion  General: No tenderness or deformity  Skin:     General: Skin is warm and dry  Capillary Refill: Capillary refill takes less than 2 seconds  Findings: No rash  Neurological:      Mental Status: She is alert and oriented to person, place, and time     Psychiatric:         Behavior: Behavior normal          Vital Signs  ED Triage Vitals [02/18/21 1121]   Temperature Pulse Respirations Blood Pressure SpO2   98 3 °F (36 8 °C) 64 20 160/64 96 %      Temp src Heart Rate Source Patient Position - Orthostatic VS BP Location FiO2 (%)   -- Monitor Sitting Left arm --      Pain Score       9           Vitals:    02/18/21 1121 02/18/21 1428   BP: 160/64 133/77   Pulse: 64 77   Patient Position - Orthostatic VS: Sitting Lying         Visual Acuity      ED Medications  Medications   ondansetron (FOR EMS ONLY) (ZOFRAN) 4 mg/2 mL injection 4 mg (0 mg Does not apply Given to EMS 2/18/21 1124)   sodium chloride 0 9 % bolus 1,000 mL (0 mL Intravenous Stopped 2/18/21 1314)   haloperidol lactate (HALDOL) injection 5 mg (5 mg Intramuscular Given 2/18/21 1142)   famotidine (PEPCID) injection 40 mg (40 mg Intravenous Given 2/18/21 1141)   sodium chloride 0 9 % bolus 1,000 mL (1,000 mL Intravenous New Bag 2/18/21 1314)       Diagnostic Studies  Results Reviewed     Procedure Component Value Units Date/Time    Urine Microscopic [[id number]]  (Abnormal) Collected: 02/18/21 1134    Lab Status: Final result Specimen: Urine, Clean Catch Updated: 02/18/21 1217     RBC, UA 1-2 /hpf      WBC, UA 2-4 /hpf      Epithelial Cells Moderate /hpf      Bacteria, UA Moderate /hpf     Comprehensive metabolic panel [[id number]]  (Abnormal) Collected: 02/18/21 1136    Lab Status: Final result Specimen: Blood from Arm, Left Updated: 02/18/21 1211     Sodium 141 mmol/L      Potassium 3 7 mmol/L      Chloride 107 mmol/L      CO2 27 mmol/L      ANION GAP 7 mmol/L      BUN 12 mg/dL      Creatinine 0 70 mg/dL      Glucose 100 mg/dL      Calcium 8 8 mg/dL      AST 27 U/L      ALT 33 U/L      Alkaline Phosphatase 76 U/L      Total Protein 7 4 g/dL      Albumin 4 1 g/dL      Total Bilirubin 1 00 mg/dL      eGFR 117 ml/min/1 73sq m     Narrative:      National Kidney Disease Foundation guidelines for Chronic Kidney Disease (CKD):     Stage 1 with normal or high GFR (GFR > 90 mL/min/1 73 square meters)    Stage 2 Mild CKD (GFR = 60-89 mL/min/1 73 square meters)    Stage 3A Moderate CKD (GFR = 45-59 mL/min/1 73 square meters)    Stage 3B Moderate CKD (GFR = 30-44 mL/min/1 73 square meters)    Stage 4 Severe CKD (GFR = 15-29 mL/min/1 73 square meters)    Stage 5 End Stage CKD (GFR <15 mL/min/1 73 square meters)  Note: GFR calculation is accurate only with a steady state creatinine    UA (URINE) with reflex to Scope [[id number]]  (Abnormal) Collected: 02/18/21 1134    Lab Status: Final result Specimen: Urine, Clean Catch Updated: 02/18/21 1206     Color, UA Milly     Clarity, UA Slightly Cloudy     Specific Gravity, UA 1 025     pH, UA 5 0     Leukocytes, UA 25 0     Nitrite, UA Negative     Protein, UA 15 (Trace) mg/dl      Glucose, UA Negative mg/dl      Ketones, UA >=150 (3+) mg/dl      Bilirubin, UA Negative     Blood, UA 50 0     UROBILINOGEN UA Negative mg/dL     CBC and differential [[id number]]  (Abnormal) Collected: 02/18/21 1136    Lab Status: Final result Specimen: Blood from Arm, Left Updated: 02/18/21 1206     WBC 10 00 Thousand/uL      RBC 5 08 Million/uL      Hemoglobin 14 5 g/dL      Hematocrit 43 9 %      MCV 86 fL      MCH 28 5 pg      MCHC 33 0 g/dL      RDW 12 6 %      MPV 7 9 fL      Platelets 950 Thousands/uL      Neutrophils Relative 69 %      Lymphocytes Relative 24 %      Monocytes Relative 4 %      Eosinophils Relative 1 %      Basophils Relative 1 %      Neutrophils Absolute 6 90 Thousands/µL      Lymphocytes Absolute 2 40 Thousands/µL      Monocytes Absolute 0 40 Thousand/µL      Eosinophils Absolute 0 10 Thousand/µL      Basophils Absolute 0 10 Thousands/µL     POCT pregnancy, urine [[id number]]  (Normal) Resulted: 02/18/21 1137    Lab Status: Final result Updated: 02/18/21 1137     EXT PREG TEST UR (Ref: Negative) neg preg     Control valid                 No orders to display              Procedures  Procedures         ED Course  ED Course as of Feb 18 1429   Thu Feb 18, 2021   1218 No N/V  Patient resting comfortably  Ketones in UA  Will given additional IVF bolus  Otherwise grossly normal labs  Will not repeat CT at this time  1309 Continues to rest comfortably  Will let IVF finish and then patient can be discharged  SBIRT 20yo+      Most Recent Value   SBIRT (24 yo +)   In order to provide better care to our patients, we are screening all of our patients for alcohol and drug use  Would it be okay to ask you these screening questions? Yes Filed at: 02/18/2021 1344   Initial Alcohol Screen: US AUDIT-C    1  How often do you have a drink containing alcohol?  0 Filed at: 02/18/2021 1344   2  How many drinks containing alcohol do you have on a typical day you are drinking? 0 Filed at: 02/18/2021 1344   3a  Male UNDER 65: How often do you have five or more drinks on one occasion? 0 Filed at: 02/18/2021 1344   3b  FEMALE Any Age, or MALE 65+: How often do you have 4 or more drinks on one occassion? 0 Filed at: 02/18/2021 1344   Audit-C Score  0 Filed at: 02/18/2021 1344   MEGHANN: How many times in the past year have you    Used an illegal drug or used a prescription medication for non-medical reasons?   Never Filed at: 02/18/2021 1344                    MDM  Number of Diagnoses or Management Options  Cannabinoid hyperemesis syndrome:   Constipation:   Diagnosis management comments: I have reviewed any of the patient's vist and any testing done in the emergency department  They have verbalized their understanding of any testing done today and have no further questions or concerns regarding their care in the emergency room  They will follow up with their primary care physician as well as with any specialist in their discharge instructions  Strict return precautions were discussed  Amount and/or Complexity of Data Reviewed  Clinical lab tests: ordered and reviewed  Tests in the radiology section of CPT®: reviewed  Tests in the medicine section of CPT®: ordered and reviewed  Independent visualization of images, tracings, or specimens: yes        Disposition  Final diagnoses:   Cannabinoid hyperemesis syndrome   Constipation     Time reflects when diagnosis was documented in both MDM as applicable and the Disposition within this note     Time User Action Codes Description Comment    2/18/2021  2:27 PM Evercobya Maxcy Add [R11 2,  F12 90] Cannabinoid hyperemesis syndrome     2/18/2021  2:27 PM Pankaja Maxcy Add [K59 00] Constipation       ED Disposition     ED Disposition Condition Date/Time Comment    Discharge Stable Thu Feb 18, 9496  8:82 PM Parris Meza discharge to home/self care              Follow-up Information     Follow up With Specialties Details Why Contact Info Additional Information    Nicky Rodriguez MD Family Medicine   [address]  [address] [place] [address] [facility] [place] [facility]   [address]  [address] [facility] [address]  Michelle Baumann [address] [facility] [place], [address], [address], [place], [place], [address] [phone]          Patient's Medications   Discharge Prescriptions    DOCUSATE SODIUM (COLACE) 100 MG CAPSULE    Take 1 capsule (100 mg total) by mouth every 12 (twelve) hours       Start Date: 2/18/2021 End Date: --       Order Dose: 100 mg       Quantity: 60 capsule    Refills: 0    ONDANSETRON (ZOFRAN) 4 MG TABLET    Take 1 tablet (4 mg total) by mouth every 6 (six) hours       Start Date: 2/18/2021 End Date: --       Order Dose: 4 mg       Quantity: 12 tablet    Refills: 0     No discharge procedures on file      PDMP Review     None          ED Provider  Electronically Signed by           Margot Wells DO  02/18/21 6901

## 2021-02-18 NOTE — ED NOTES
Pt urinated and threw up on floor  RN made aware  Call bell in reach, urine sample not provided at this time         Rae Walker  02/18/21 1036

## 2021-02-19 NOTE — ED NOTES
RN reassessed pt, pt states she is tolerating crackers but states "I cant drink the ginger ale" pt states she still feels nauseous   Provider made aware     Grier Burkitt  02/18/21 1953

## 2021-02-19 NOTE — DISCHARGE INSTRUCTIONS
Please refer to the attached information for strict return instructions  If symptoms worsen or new symptoms develop please return to the ER  Drink plenty of fluids to stay hydrated  Schedule follow up with your primary care physician as soon as possible  Discontinue any further use of marijuana

## 2021-02-19 NOTE — ED NOTES
Pt provided with orquidea and ginger ale for po challenge        Toshia Deleon, KARYNA  02/18/21 1918

## 2021-02-19 NOTE — ED PROVIDER NOTES
History  Chief Complaint   Patient presents with    Abdominal Pain     with abd pain, chills and vomiting x3 day seen for it 2 times not feeling any better     This is a 34 yo F who presents to the ED for her fourth visit since 2/16 for nausea, vomiting, and abdominal pain  Patient notes numerous daily episodes of nonbloody nonbilious emesis for the past 3 days  Patient also notes abdominal pain which at this time is concentrated primarily in the epigastric region  Reports pain is worsened by and partially relieved following emesis  Patient had several workups in ED including labs, CT abd/pelvis yesterday and repeated bloodwork/urine dip/preg today  CT revealed constipation which patient does admit to, however notes she is still passing gas normally  Reports she has had significant relief with Haldol, which she was given this morning for symptoms  Notes minimal relief with at home Zofran and reports having side effects from reglan  On record review of previous visits, it was documented that the patient is a current daily marijuana user  However, the patient denies this at this time and reports she has not used marijuana in approximately 30 days  Urine drug screen from yesterday morning positive for THC  Patient also requests COVID-19 testing after noting temperature elevated at triage  Denies chills/body aches  History provided by:  Patient   used: No    Abdominal Pain  Pain location:  Epigastric  Pain radiates to:  Does not radiate  Duration:  3 days  Timing:  Constant  Progression:  Waxing and waning  Chronicity:  Recurrent  Context: not alcohol use, not recent travel, not sick contacts, not suspicious food intake and not trauma    Relieved by: Haldol    Worsened by:  Eating  Associated symptoms: nausea and vomiting    Associated symptoms: no chest pain, no chills, no constipation, no cough, no diarrhea, no dysuria, no fever, no shortness of breath and no sore throat    Risk factors: no alcohol abuse and no NSAID use        Prior to Admission Medications   Prescriptions Last Dose Informant Patient Reported? Taking? albuterol (2 5 mg/3 mL) 0 083 % nebulizer solution   No No   Sig: Take 1 vial (2 5 mg total) by nebulization every 6 (six) hours as needed for wheezing or shortness of breath   albuterol (PROVENTIL HFA,VENTOLIN HFA) 90 mcg/act inhaler   No No   Sig: Inhale 2 puffs every 6 (six) hours as needed for wheezing   docusate sodium (COLACE) 100 mg capsule   No No   Sig: Take 1 capsule (100 mg total) by mouth every 12 (twelve) hours   ondansetron (ZOFRAN) 4 mg tablet   No No   Sig: Take 1 tablet (4 mg total) by mouth every 6 (six) hours   ondansetron (ZOFRAN) 4 mg tablet   No No   Sig: Take 1 tablet (4 mg total) by mouth every 6 (six) hours   ondansetron (ZOFRAN-ODT) 4 mg disintegrating tablet   No No   Sig: Take 1 tablet (4 mg total) by mouth every 6 (six) hours as needed for nausea or vomiting      Facility-Administered Medications: None       Past Medical History:   Diagnosis Date    Asthma     Heroin abuse ([facility] [address] )        History reviewed  No pertinent surgical history  Family History   Problem Relation Age of Onset    Throat cancer Maternal Grandfather     Asthma Family     Cancer Mother     No Known Problems Father      I have reviewed and agree with the history as documented  E-Cigarette/Vaping    E-Cigarette Use Never User      E-Cigarette/Vaping Substances     Social History     Tobacco Use    Smoking status: Current Every Day Smoker     Packs/day: 0 25     Years: 10 00     Pack years: 2 50     Types: Cigarettes    Smokeless tobacco: Never Used   Substance Use Topics    Alcohol use: Never     Frequency: Never    Drug use: Not Currently     Types: Marijuana, Heroin, Fentanyl     Comment: last used today 2/17/2021       Review of Systems   Constitutional: Positive for appetite change  Negative for chills and fever     HENT: Negative for congestion, rhinorrhea and sore throat  Eyes: Negative for pain and visual disturbance  Respiratory: Negative for cough, shortness of breath and wheezing  Cardiovascular: Negative for chest pain and palpitations  Gastrointestinal: Positive for abdominal pain, nausea and vomiting  Negative for anal bleeding, blood in stool, constipation and diarrhea  Genitourinary: Negative for dysuria, frequency and urgency  Musculoskeletal: Negative for back pain, neck pain and neck stiffness  Skin: Negative for rash and wound  Neurological: Negative for dizziness, weakness, light-headedness and numbness  Physical Exam  Physical Exam  Constitutional:       General: She is not in acute distress  Appearance: She is well-developed  She is not diaphoretic  HENT:      Head: Normocephalic and atraumatic  Right Ear: External ear normal       Left Ear: External ear normal    Eyes:      Conjunctiva/sclera: Conjunctivae normal       Pupils: Pupils are equal, round, and reactive to light  Neck:      Musculoskeletal: Normal range of motion and neck supple  Cardiovascular:      Rate and Rhythm: Normal rate and regular rhythm  Heart sounds: Normal heart sounds  No murmur  No friction rub  No gallop  Pulmonary:      Effort: Pulmonary effort is normal  No respiratory distress  Breath sounds: Normal breath sounds  No wheezing  Abdominal:      General: There is no distension  Palpations: Abdomen is soft  Tenderness: There is abdominal tenderness in the epigastric area  There is no right CVA tenderness, left CVA tenderness, guarding or rebound  Negative signs include Tobias's sign, Rovsing's sign and McBurney's sign  Lymphadenopathy:      Cervical: No cervical adenopathy  Skin:     General: Skin is warm and dry  Capillary Refill: Capillary refill takes less than 2 seconds  Findings: No erythema or rash  Neurological:      Mental Status: She is alert and oriented to person, place, and time        Motor: No abnormal muscle tone  Coordination: Coordination normal    Psychiatric:         Behavior: Behavior normal          Thought Content: Thought content normal          Judgment: Judgment normal          Vital Signs  ED Triage Vitals [02/18/21 1614]   Temperature Pulse Respirations Blood Pressure SpO2   100 3 °F (37 9 °C) 75 16 149/75 97 %      Temp Source Heart Rate Source Patient Position - Orthostatic VS BP Location FiO2 (%)   Oral Monitor Lying Left arm --      Pain Score       Worst Possible Pain           Vitals:    02/18/21 1614 02/18/21 1824 02/18/21 2023   BP: 149/75 146/72 135/82   Pulse: 75 67 68   Patient Position - Orthostatic VS: Lying Lying Lying         Visual Acuity      ED Medications  Medications   haloperidol lactate (HALDOL) injection 5 mg (5 mg Intramuscular Given 2/18/21 1717)   ketorolac (TORADOL) injection 15 mg (15 mg Intravenous Given 2/18/21 1715)   sodium chloride 0 9 % bolus 1,000 mL (0 mL Intravenous Stopped 2/18/21 1823)   ondansetron (ZOFRAN) injection 4 mg (4 mg Intravenous Given 2/18/21 1915)   aluminum-magnesium hydroxide-simethicone (MYLANTA) oral suspension 20 mL (20 mL Oral Given 2/18/21 1915)       Diagnostic Studies  Results Reviewed     Procedure Component Value Units Date/Time    Novel Coronavirus [facility] [[id number]] Collected: 02/18/21 1923    Lab Status:  In process Specimen: Nares from Nasopharyngeal Swab Updated: 02/18/21 1925    Urine Microscopic [[id number]]  (Abnormal) Collected: 02/18/21 1830    Lab Status: Final result Specimen: Urine, Clean Catch Updated: 02/18/21 1857     RBC, UA 0-1 /hpf      WBC, UA 1-2 /hpf      Epithelial Cells Occasional /hpf      Bacteria, UA Occasional /hpf     POCT urinalysis dipstick [[id number]]  (Normal) Resulted: 02/18/21 1836    Lab Status: Final result Updated: 02/18/21 1836     Color, UA yellow    Urine Macroscopic, POC [[id number]]  (Abnormal) Collected: 02/18/21 1830    Lab Status: Final result Specimen: Urine Updated: 02/18/21 1831     Color, UA Yellow     Clarity, UA Cloudy     pH, UA 6 0     Leukocytes, UA Negative     Nitrite, UA Negative     Protein, UA Negative mg/dl      Glucose, UA Negative mg/dl      Ketones, UA 80 (3+) mg/dl      Urobilinogen, UA 0 2 E U /dl      Bilirubin, UA Negative     Blood, UA Trace     Specific Gravity, UA >=1 030    Narrative:      CLINITEK RESULT                 No orders to display              Procedures  Procedures         ED Course  ED Course as of Feb 18 2150   Thu Feb 18, 2021   1707 Record review shows the patient had workup including labs, CT abd/pelvis and urine dip/preg yesterday as well as repeated labs, IVF, urine dip/preg today  Significant for ketones in urine suggestive of dehydration and constipation  Will start with IM haldol for nausea, toradol for pain  1712 IV placed by RN  Will switch toradol to IV route and provide 1 L fluid bolus  Will defer repeat labs given obtained approximately 4 hours ago  1713 Patient near-febrile, however no tachycardia/tachypnea or leukocytosis on earlier labs to indicate +SIRS screen  Temperature: 100 3 °F (37 9 °C)   1813 Pt resting comfortably  No further emesis after haldol administration  1926 Pt notes persitent epigastric pain c/w previous history of gastritis  Will provide mylanta, Zofran for continued nausea control  SBIRT 20yo+      Most Recent Value   SBIRT (24 yo +)   In order to provide better care to our patients, we are screening all of our patients for alcohol and drug use  Would it be okay to ask you these screening questions? No Filed at: 02/18/2021 1627                    [facility]  Number of Diagnoses or Management Options  Epigastric abdominal pain:   Nausea and vomiting:   Diagnosis management comments: Nausea, vomiting, epigastric pain for past several days with numerous ED evaluations and workups for similar   Mid-epigastric abdominal TTP on exam  Temperature noted to be elevated at arrival to 100 3, however does not otherwise meet SIRS by vitals or by recent labs  Given labs obtained several hours prior to presentation for this visit will defer repeated bloodwork  Will obtain urine dip for ketones to suggest dehydration  Will treat with haldol, fluids, consider mylanta once nausea improved  Will provide toradol for pain   ----------------------------  3+ ketones suggestive of dehydration in urine dip, however patient did receive 2L earlier today  Given additional 1L fluid bolus here and is tolerating PO following Haldol  Patient notes significant improvement in nausea with haldol but persistent epigastric pain  However, notes her abdominal pain is moderately improved following mylanta  Appears stable for discharge  Will order carafate, mylanta for epigastric pain/gastritis, Zofran ODT PRN N/V  Continued discontinuation of marijuana use strongly encouraged  Return to ED indications reviewed  Amount and/or Complexity of Data Reviewed  Clinical lab tests: reviewed  Tests in the radiology section of CPT®: reviewed    Patient Progress  Patient progress: improved      Disposition  Final diagnoses:   Nausea and vomiting   Epigastric abdominal pain     Time reflects when diagnosis was documented in both MDM as applicable and the Disposition within this note     Time User Action Codes Description Comment    2/18/2021  7:59 PM Jordan Blush Add [R11 2] Nausea and vomiting     2/18/2021  7:59 PM Jordan Blush Add [R10 13] Epigastric abdominal pain     2/18/2021  7:59 PM Blanca Rastafarian [A67 9] Carate     2/18/2021  7:59 PM Harvinder Ill [A67 9] Carate       ED Disposition     ED Disposition Condition Date/Time Comment    Discharge Stable Thu Feb 18, 4332  8:59 PM Fadia Cruz discharge to home/self care              Follow-up Information     Follow up With Specialties Details Why Contact Info Additional Information    Chaparro Wilkins MD Family Medicine Schedule an appointment as soon as possible for a visit   [address] [facility]  Janelle Thompson [address] [facility]       [address] [place] [address] Emergency Department Emergency Medicine  If symptoms worsen [facility] [id number]  [address] [facility] Emergency Department, [address] , [facility], [facility], [address]          Discharge Medication List as of 2/18/2021  8:04 PM      START taking these medications    Details   aluminum-magnesium hydroxide-simethicone (MAALOX MAX) 400-400-40 MG/5ML suspension Take 10 mL by mouth every 6 (six) hours as needed (Abdominal pain, indigestion), Starting Thu 2/18/2021, Normal      !! ondansetron (ZOFRAN-ODT) 4 mg disintegrating tablet Take 1 tablet (4 mg total) by mouth every 6 (six) hours as needed for nausea or vomiting, Starting Thu 2/18/2021, Normal      sucralfate (CARAFATE) 1 g tablet Take 1 tablet (1 g total) by mouth 4 (four) times a day for 10 days, Starting Thu 2/18/2021, Until Sun 2/28/2021, Normal       !! - Potential duplicate medications found  Please discuss with provider        CONTINUE these medications which have NOT CHANGED    Details   albuterol (2 5 mg/3 mL) 0 083 % nebulizer solution Take 1 vial (2 5 mg total) by nebulization every 6 (six) hours as needed for wheezing or shortness of breath, Starting Sun 1/3/2021, Normal      albuterol (PROVENTIL HFA,VENTOLIN HFA) 90 mcg/act inhaler Inhale 2 puffs every 6 (six) hours as needed for wheezing, Starting Sun 1/3/2021, Normal      docusate sodium (COLACE) 100 mg capsule Take 1 capsule (100 mg total) by mouth every 12 (twelve) hours, Starting Thu 2/18/2021, Normal      !! ondansetron (ZOFRAN) 4 mg tablet Take 1 tablet (4 mg total) by mouth every 6 (six) hours, Starting Wed 2/17/2021, Normal      !! ondansetron (ZOFRAN) 4 mg tablet Take 1 tablet (4 mg total) by mouth every 6 (six) hours, Starting Thu 2/18/2021, Normal      !! ondansetron (ZOFRAN-ODT) 4 mg disintegrating tablet Take 1 tablet (4 mg total) by mouth every 6 (six) hours as needed for nausea or vomiting, Starting Wed 2/17/2021, Normal       !! - Potential duplicate medications found  Please discuss with provider  No discharge procedures on file      PDMP Review     None          ED Provider  Electronically Signed by           Desirae Calderon PA-C  02/18/21 3026

## 2021-03-23 ENCOUNTER — OFFICE VISIT (OUTPATIENT)
Dept: URGENT CARE | Age: 31
End: 2021-03-23
Payer: COMMERCIAL

## 2021-03-23 VITALS
HEART RATE: 83 BPM | OXYGEN SATURATION: 92 % | RESPIRATION RATE: 18 BRPM | SYSTOLIC BLOOD PRESSURE: 97 MMHG | TEMPERATURE: 97.9 F | DIASTOLIC BLOOD PRESSURE: 51 MMHG

## 2021-03-23 DIAGNOSIS — J45.901 ASTHMA WITH ACUTE EXACERBATION, UNSPECIFIED ASTHMA SEVERITY, UNSPECIFIED WHETHER PERSISTENT: Primary | ICD-10-CM

## 2021-03-23 PROCEDURE — 99213 OFFICE O/P EST LOW 20 MIN: CPT | Performed by: NURSE PRACTITIONER

## 2021-03-23 RX ORDER — METHYLPREDNISOLONE 4 MG/1
TABLET ORAL
Qty: 1 EACH | Refills: 0 | Status: SHIPPED | OUTPATIENT
Start: 2021-03-23

## 2021-03-23 RX ORDER — ALBUTEROL SULFATE 90 UG/1
2 AEROSOL, METERED RESPIRATORY (INHALATION) EVERY 6 HOURS PRN
Qty: 18 G | Refills: 0 | Status: SHIPPED | OUTPATIENT
Start: 2021-03-23 | End: 2021-04-27

## 2021-03-23 NOTE — PROGRESS NOTES
[facility]        NAME: Laurence Wagner is a 35 y o  female  : 1990    MRN: [id number]  DATE: 2021  TIME: 12:19 PM    Assessment and Plan   Asthma with acute exacerbation, unspecified asthma severity, unspecified whether persistent [J45 901]  1  Asthma with acute exacerbation, unspecified asthma severity, unspecified whether persistent  albuterol (Ventolin HFA) 90 mcg/act inhaler    methylPREDNISolone 4 MG tablet therapy pack         Patient Instructions     Mucinex DM OTC BID prn  take meds as directed  Follow up with PCP in 3-5 days  Proceed to  ER if symptoms worsen  Chief Complaint     Chief Complaint   Patient presents with    Shortness of Breath     c/o "flare up of asthma" x 3 days          History of Present Illness       HPI   Reports she has had problems with breathing due asthma flare up  Feels like pressure on the chest, with mild cough and phlegm and wheezing  Says feels like previous asthma flare ups  Duration x 2-3 days  Says she has been using her albuterol inhaler about 3x/day  Smoker x 10 yrs or more  Currently 1/2 gume per day  Hx of asthma x 2 yrs    Review of Systems   Review of Systems   Constitutional: Negative for chills and fever  HENT: Negative for sore throat and trouble swallowing  Respiratory: Positive for cough, chest tightness, shortness of breath and wheezing  Cardiovascular: Negative for chest pain  Gastrointestinal: Negative for diarrhea, nausea and vomiting  Neurological: Negative for dizziness           Current Medications       Current Outpatient Medications:     albuterol (2 5 mg/3 mL) 0 083 % nebulizer solution, Take 1 vial (2 5 mg total) by nebulization every 6 (six) hours as needed for wheezing or shortness of breath, Disp: 30 vial, Rfl: 0    albuterol (PROVENTIL HFA,VENTOLIN HFA) 90 mcg/act inhaler, Inhale 2 puffs every 6 (six) hours as needed for wheezing, Disp: 1 Inhaler, Rfl: 0    albuterol (Ventolin HFA) 90 mcg/act inhaler, Inhale 2 puffs every 6 (six) hours as needed for wheezing, Disp: 18 g, Rfl: 0    aluminum-magnesium hydroxide-simethicone (MAALOX MAX) 400-400-40 MG/5ML suspension, Take 10 mL by mouth every 6 (six) hours as needed (Abdominal pain, indigestion), Disp: 355 mL, Rfl: 0    docusate sodium (COLACE) 100 mg capsule, Take 1 capsule (100 mg total) by mouth every 12 (twelve) hours, Disp: 60 capsule, Rfl: 0    methylPREDNISolone 4 MG tablet therapy pack, Use as directed on package, Disp: 1 each, Rfl: 0    ondansetron (ZOFRAN) 4 mg tablet, Take 1 tablet (4 mg total) by mouth every 6 (six) hours, Disp: 12 tablet, Rfl: 0    ondansetron (ZOFRAN) 4 mg tablet, Take 1 tablet (4 mg total) by mouth every 6 (six) hours, Disp: 12 tablet, Rfl: 0    ondansetron (ZOFRAN-ODT) 4 mg disintegrating tablet, Take 1 tablet (4 mg total) by mouth every 6 (six) hours as needed for nausea or vomiting, Disp: 20 tablet, Rfl: 0    ondansetron (ZOFRAN-ODT) 4 mg disintegrating tablet, Take 1 tablet (4 mg total) by mouth every 6 (six) hours as needed for nausea or vomiting, Disp: 20 tablet, Rfl: 0    sucralfate (CARAFATE) 1 g tablet, Take 1 tablet (1 g total) by mouth 4 (four) times a day for 10 days, Disp: 40 tablet, Rfl: 0    Current Allergies     Allergies as of 03/23/2021    (No Known Allergies)            The following portions of the patient's history were reviewed and updated as appropriate: allergies, current medications, past family history, past medical history, past social history, past surgical history and problem list      Past Medical History:   Diagnosis Date    Asthma     Heroin abuse ([facility] [address] )        History reviewed  No pertinent surgical history  Family History   Problem Relation Age of Onset    Throat cancer Maternal Grandfather     Asthma Family     Cancer Mother     No Known Problems Father          Medications have been verified          Objective   BP 97/51   Pulse 83   Temp 97 9 °F (36 6 °C)   Resp 18   SpO2 92% No LMP recorded  Physical Exam     Physical Exam  Constitutional:       General: She is not in acute distress  Appearance: She is not ill-appearing, toxic-appearing or diaphoretic  Cardiovascular:      Rate and Rhythm: Regular rhythm  Pulmonary:      Effort: Pulmonary effort is normal       Breath sounds: Examination of the right-upper field reveals wheezing  Examination of the left-upper field reveals wheezing  Examination of the right-middle field reveals wheezing  Examination of the left-middle field reveals wheezing  Examination of the right-lower field reveals wheezing  Examination of the left-lower field reveals wheezing  Wheezing present  No decreased breath sounds  Comments: Also congestion in the chest  Neurological:      Mental Status: She is alert

## 2021-03-23 NOTE — PATIENT INSTRUCTIONS
Asthma   WHAT YOU NEED TO KNOW:   Asthma is a lung disease that makes breathing difficult  Chronic inflammation and reactions to triggers narrow the airways in the lungs  Asthma can become life-threatening if it is not managed  DISCHARGE INSTRUCTIONS:   Call your local emergency number (911 in the [address]) if:   · You have severe shortness of breath  · Your lips or nails turn blue or gray  · The skin around your neck and ribs pulls in with each breath  · You have shortness of breath, even after you take your short-term medicine as directed  · Your peak flow numbers are in the red zone of your AAP  Call your doctor if:   · You run out of medicine before your next refill is due  · Your symptoms get worse  · You need to take more medicine than usual to control your symptoms  · You have questions or concerns about your condition or care  Medicines:   · Medicines  decrease inflammation, open airways, and make it easier to breathe  Medicines may be inhaled, taken as a pill, or injected  Short-term medicines relieve your symptoms quickly  Long-term medicines are used to prevent future attacks  You may also need medicine to help control your allergies  Ask your healthcare provider for more information about the medicine you are given and how to take it safely  · Take your medicine as directed  Contact your healthcare provider if you think your medicine is not helping or if you have side effects  Tell him of her if you are allergic to any medicine  Keep a list of the medicines, vitamins, and herbs you take  Include the amounts, and when and why you take them  Bring the list or the pill bottles to follow-up visits  Carry your medicine list with you in case of an emergency  Follow up with your healthcare provider as directed: You will need to return to make sure your medicine is working and your symptoms are controlled   You may be referred to an asthma specialist  [address] may be asked to keep a record of your peak flow values and bring it with you to your appointments  Write down your questions so you remember to ask them during your visits  Manage your symptoms and prevent future attacks:   · Follow your Asthma Action Plan (AAP)  This is a written plan that you and your healthcare provider create  It explains which medicine you need and when to change doses if necessary  It also explains how you can monitor symptoms and use a peak flow meter  The meter measures how well your lungs are working  · Manage other health conditions , such as allergies, acid reflux, and sleep apnea  · Identify and avoid triggers  These may include pets, dust mites, mold, and cockroaches  · Do not smoke or be around others who smoke  Nicotine and other chemicals in cigarettes and cigars can cause lung damage  Ask your healthcare provider for information if you currently smoke and need help to quit  E-cigarettes or smokeless tobacco still contain nicotine  Talk to your healthcare provider before you use these products  · Ask about the flu vaccine  The flu can make your asthma worse  You may need a yearly flu shot  © Copyright [address] Information is for End User's use only and may not be sold, redistributed or otherwise used for commercial purposes  All illustrations and images included in CareNotes® are the copyrighted property of A D A M , Inc  or [address] [place] [address] Roman   The above information is an  only  It is not intended as medical advice for individual conditions or treatments  Talk to your doctor, nurse or pharmacist before following any medical regimen to see if it is safe and effective for you

## 2021-04-05 ENCOUNTER — OFFICE VISIT (OUTPATIENT)
Dept: URGENT CARE | Age: 31
End: 2021-04-05
Payer: COMMERCIAL

## 2021-04-05 VITALS
DIASTOLIC BLOOD PRESSURE: 58 MMHG | TEMPERATURE: 98.2 F | OXYGEN SATURATION: 97 % | HEART RATE: 58 BPM | SYSTOLIC BLOOD PRESSURE: 114 MMHG | RESPIRATION RATE: 18 BRPM

## 2021-04-05 DIAGNOSIS — R39.89 POSSIBLE URINARY TRACT INFECTION: Primary | ICD-10-CM

## 2021-04-05 LAB
SL AMB  POCT GLUCOSE, UA: NEGATIVE
SL AMB LEUKOCYTE ESTERASE,UA: ABNORMAL
SL AMB POCT BILIRUBIN,UA: NEGATIVE
SL AMB POCT BLOOD,UA: ABNORMAL
SL AMB POCT CLARITY,UA: ABNORMAL
SL AMB POCT COLOR,UA: YELLOW
SL AMB POCT KETONES,UA: 15
SL AMB POCT NITRITE,UA: NEGATIVE
SL AMB POCT PH,UA: 6.5
SL AMB POCT SPECIFIC GRAVITY,UA: 1.01
SL AMB POCT URINE PROTEIN: ABNORMAL
SL AMB POCT UROBILINOGEN: 0.2

## 2021-04-05 PROCEDURE — 87086 URINE CULTURE/COLONY COUNT: CPT | Performed by: NURSE PRACTITIONER

## 2021-04-05 PROCEDURE — 81002 URINALYSIS NONAUTO W/O SCOPE: CPT | Performed by: NURSE PRACTITIONER

## 2021-04-05 PROCEDURE — 99213 OFFICE O/P EST LOW 20 MIN: CPT | Performed by: NURSE PRACTITIONER

## 2021-04-05 RX ORDER — NITROFURANTOIN 25; 75 MG/1; MG/1
100 CAPSULE ORAL 2 TIMES DAILY
Qty: 10 CAPSULE | Refills: 0 | Status: SHIPPED | OUTPATIENT
Start: 2021-04-05

## 2021-04-05 NOTE — LETTER
April 5, 1631     Patient: Noah Chapman   YOB: 1990   Date of Visit: 4/5/2021       To Whom it May Concern:    Lawyer Hdez was seen in my clinic on 4/5/2021  She may return to work on 04/06/2021  If you have any questions or concerns, please don't hesitate to call           Sincerely,          NAMRATA Clark        CC: No Recipients

## 2021-04-05 NOTE — PATIENT INSTRUCTIONS
Urinary Tract Infection in Women   WHAT YOU NEED TO KNOW:   A urinary tract infection (UTI) is caused by bacteria that get inside your urinary tract  Most bacteria that enter your urinary tract come out when you urinate  If the bacteria stay in your urinary tract, you may get an infection  Your urinary tract includes your kidneys, ureters, bladder, and urethra  Urine is made in your kidneys, and it flows from the ureters to the bladder  Urine leaves the bladder through the urethra  A UTI is more common in your lower urinary tract, which includes your bladder and urethra  DISCHARGE INSTRUCTIONS:   Return to the emergency department if:   · You are urinating very little or not at all  · You have a high fever with shaking chills  · You have side or back pain that gets worse  Call your doctor if:   · You have a fever  · You do not feel better after 2 days of taking antibiotics  · You are vomiting  · You have questions or concerns about your condition or care  Medicines:   · Antibiotics  help fight a bacterial infection  If you have UTIs often (called recurrent UTIs), you may be given antibiotics to take regularly  You will be given directions for when and how to use antibiotics  The goal is to prevent UTIs but not cause antibiotic resistance by using antibiotics too often  · Medicines  may be given to decrease pain and burning when you urinate  They will also help decrease the feeling that you need to urinate often  These medicines will make your urine orange or red  · Take your medicine as directed  Contact your healthcare provider if you think your medicine is not helping or if you have side effects  Tell him or her if you are allergic to any medicine  Keep a list of the medicines, vitamins, and herbs you take  Include the amounts, and when and why you take them  Bring the list or the pill bottles to follow-up visits   Carry your medicine list with you in case of an emergency  Follow up with your healthcare provider as directed:  Write down your questions so you remember to ask them during your visits  Prevent another UTI:   · Empty your bladder often  Urinate and empty your bladder as soon as you feel the need  Do not hold your urine for long periods of time  · Wipe from front to back after you urinate or have a bowel movement  This will help prevent germs from getting into your urinary tract through your urethra  · Drink liquids as directed  Ask how much liquid to drink each day and which liquids are best for you  You may need to drink more liquids than usual to help flush out the bacteria  Do not drink alcohol, caffeine, or citrus juices  These can irritate your bladder and increase your symptoms  Your healthcare provider may recommend cranberry juice to help prevent a UTI  · Urinate after you have sex  This can help flush out bacteria passed during sex  · Do not douche or use feminine deodorants  These can change the chemical balance in your vagina  · Change sanitary pads or tampons often  This will help prevent germs from getting into your urinary tract  · Talk to your healthcare provider about your birth control method  You may need to change your method if it is increasing your risk for UTIs  · Wear cotton underwear and clothes that are loose  Tight pants and nylon underwear can trap moisture and cause bacteria to grow  · Vaginal estrogen may be recommended  This medicine helps prevent UTIs in women who have gone through menopause or are in melo-menopause  · Do pelvic muscle exercises often  Pelvic muscle exercises may help you start and stop urinating  Strong pelvic muscles may help you empty your bladder easier  Squeeze these muscles tightly for 5 seconds like you are trying to hold back urine  Then relax for 5 seconds  Gradually work up to squeezing for 10 seconds  Do 3 sets of 15 repetitions a day, or as directed      © Copyright [address] Information is for [employer] use only and may not be sold, redistributed or otherwise used for commercial purposes  All illustrations and images included in CareNotes® are the copyrighted property of A D [employer] M [employer]  or Barbara Woodard  The above information is an  only  It is not intended as medical advice for individual conditions or treatments  Talk to your doctor, nurse or pharmacist before following any medical regimen to see if it is safe and effective for you

## 2021-04-05 NOTE — PROGRESS NOTES
[facility]        NAME: Milad Carrion is a 35 y o  female  : 1990    MRN: [id number]  DATE: 2021  TIME: 1:30 PM    Assessment and Plan   Possible urinary tract infection [R39 89]  1  Possible urinary tract infection  POCT urine dip    Urine culture    nitrofurantoin (MACROBID) 100 mg capsule         Patient Instructions     Urine dip is positive for trace of blood and leuks esterace  Tylenol or motrin OTC prn for pain   Will send urine for culture   Follow up with PCP in 3-5 days  Proceed to  ER if symptoms worsen  Chief Complaint     Chief Complaint   Patient presents with    Abdominal Pain     b/l lower x few days    Possible UTI     c/o pain with urination          History of Present Illness       HPI   Reports lower abdominal pain x 2 days  Also urinary frequency and pain with urination  No blood or fever  Moderate to severe pain  Feels like previous UTIs  Review of Systems   Review of Systems   Constitutional: Negative for chills and fever  Gastrointestinal: Negative for nausea and vomiting  Genitourinary: Positive for dysuria and frequency  Negative for difficulty urinating, hematuria and urgency  Musculoskeletal: Negative for back pain           Current Medications       Current Outpatient Medications:     albuterol (2 5 mg/3 mL) 0 083 % nebulizer solution, Take 1 vial (2 5 mg total) by nebulization every 6 (six) hours as needed for wheezing or shortness of breath, Disp: 30 vial, Rfl: 0    albuterol (PROVENTIL HFA,VENTOLIN HFA) 90 mcg/act inhaler, Inhale 2 puffs every 6 (six) hours as needed for wheezing, Disp: 1 Inhaler, Rfl: 0    albuterol (Ventolin HFA) 90 mcg/act inhaler, Inhale 2 puffs every 6 (six) hours as needed for wheezing, Disp: 18 g, Rfl: 0    aluminum-magnesium hydroxide-simethicone (MAALOX MAX) 400-400-40 MG/5ML suspension, Take 10 mL by mouth every 6 (six) hours as needed (Abdominal pain, indigestion), Disp: 355 mL, Rfl: 0    docusate sodium (COLACE) 100 mg capsule, Take 1 capsule (100 mg total) by mouth every 12 (twelve) hours, Disp: 60 capsule, Rfl: 0    methylPREDNISolone 4 MG tablet therapy pack, Use as directed on package (Patient not taking: Reported on 4/5/2021), Disp: 1 each, Rfl: 0    nitrofurantoin (MACROBID) 100 mg capsule, Take 1 capsule (100 mg total) by mouth 2 (two) times a day, Disp: 10 capsule, Rfl: 0    ondansetron (ZOFRAN) 4 mg tablet, Take 1 tablet (4 mg total) by mouth every 6 (six) hours, Disp: 12 tablet, Rfl: 0    ondansetron (ZOFRAN) 4 mg tablet, Take 1 tablet (4 mg total) by mouth every 6 (six) hours, Disp: 12 tablet, Rfl: 0    ondansetron (ZOFRAN-ODT) 4 mg disintegrating tablet, Take 1 tablet (4 mg total) by mouth every 6 (six) hours as needed for nausea or vomiting, Disp: 20 tablet, Rfl: 0    ondansetron (ZOFRAN-ODT) 4 mg disintegrating tablet, Take 1 tablet (4 mg total) by mouth every 6 (six) hours as needed for nausea or vomiting, Disp: 20 tablet, Rfl: 0    sucralfate (CARAFATE) 1 g tablet, Take 1 tablet (1 g total) by mouth 4 (four) times a day for 10 days, Disp: 40 tablet, Rfl: 0    Current Allergies     Allergies as of 04/05/2021    (No Known Allergies)            The following portions of the patient's history were reviewed and updated as appropriate: allergies, current medications, past family history, past medical history, past social history, past surgical history and problem list      Past Medical History:   Diagnosis Date    Asthma     Heroin abuse ([facility] [address] )        History reviewed  No pertinent surgical history  Family History   Problem Relation Age of Onset    Throat cancer Maternal Grandfather     Asthma Family     Cancer Mother     No Known Problems Father          Medications have been verified  Objective   /58   Pulse 58   Temp 98 2 °F (36 8 °C)   Resp 18   LMP 03/31/2021   SpO2 97%   Patient's last menstrual period was 03/31/2021         Physical Exam     Physical Exam  Constitutional:       Appearance: She is not ill-appearing or diaphoretic  Cardiovascular:      Rate and Rhythm: Regular rhythm  Pulmonary:      Breath sounds: Normal breath sounds  Abdominal:      General: Bowel sounds are normal  There is no distension  Palpations: Abdomen is soft  Tenderness: There is abdominal tenderness in the suprapubic area  There is no right CVA tenderness, left CVA tenderness, guarding or rebound  Neurological:      Mental Status: She is alert

## 2021-04-06 LAB — BACTERIA UR CULT: NORMAL

## 2021-04-08 ENCOUNTER — OFFICE VISIT (OUTPATIENT)
Dept: URGENT CARE | Age: 31
End: 2021-04-08
Payer: COMMERCIAL

## 2021-04-08 VITALS
DIASTOLIC BLOOD PRESSURE: 64 MMHG | SYSTOLIC BLOOD PRESSURE: 119 MMHG | TEMPERATURE: 98.2 F | OXYGEN SATURATION: 99 % | HEART RATE: 70 BPM | RESPIRATION RATE: 18 BRPM

## 2021-04-08 DIAGNOSIS — K08.89 PAIN, DENTAL: Primary | ICD-10-CM

## 2021-04-08 DIAGNOSIS — K04.7 DENTAL INFECTION: ICD-10-CM

## 2021-04-08 PROCEDURE — 99213 OFFICE O/P EST LOW 20 MIN: CPT | Performed by: PHYSICIAN ASSISTANT

## 2021-04-08 RX ORDER — AMOXICILLIN AND CLAVULANATE POTASSIUM 875; 125 MG/1; MG/1
1 TABLET, FILM COATED ORAL EVERY 12 HOURS SCHEDULED
Qty: 20 TABLET | Refills: 0 | Status: SHIPPED | OUTPATIENT
Start: 2021-04-08 | End: 2021-04-18

## 2021-04-08 RX ORDER — LIDOCAINE HYDROCHLORIDE 20 MG/ML
15 SOLUTION OROPHARYNGEAL 4 TIMES DAILY PRN
Qty: 100 ML | Refills: 0 | Status: SHIPPED | OUTPATIENT
Start: 2021-04-08

## 2021-04-08 NOTE — PROGRESS NOTES
[facility]        NAME: Carol Kemp is a 35 y o  female  : 1990    MRN: [id number]  DATE: 2021  TIME: 3:34 PM    Assessment and Plan   Pain, dental [K08 89]  1  Pain, dental  amoxicillin-clavulanate (AUGMENTIN) 875-125 mg per tablet    Lidocaine Viscous HCl (XYLOCAINE) 2 % mucosal solution   2  Dental infection  amoxicillin-clavulanate (AUGMENTIN) 875-125 mg per tablet    Lidocaine Viscous HCl (XYLOCAINE) 2 % mucosal solution         Patient Instructions      Began taking antibiotic as prescribed  Take with probiotic or yogurt to prevent stomach upset  Use viscous lidocaine as needed to help numb affected tooth  Follow-up with primary care physician in 3-5 days for continue symptoms  Further evaluation and ultimate treatment of dental infection will need to be done by dentist   Call and try to schedule appointment   continue to monitor symptoms and proceed directly to the emergency room with any swelling of the lips tongue or mouth, difficulties breathing, worsening pain, inability to tolerate oral intake  Follow up with PCP in 3-5 days  Proceed to  ER if symptoms worsen  Chief Complaint     Chief Complaint   Patient presents with    Dental Pain     x last night         History of Present Illness       35year old female present to the office for c/o of dental pain that began for her last night  Patient has history of chronic dental caries and poor dentition  She states that she has been trying to get into her dentist however soon his appointment is in   She denies any specific drainage or bleeding from the affected tooth  Tooth is located on lower jaw  Right-sided  She has been trying saltwater gargles in over-the-counter medications without much relief  She states that she has had similar episodes of this in the past in which antibiotics at to be given to her in order to help relieve her  Pain  No known drug allergies  Dental Pain   This is a new problem  The current episode started yesterday  The problem has been unchanged  The pain is moderate  Associated symptoms include facial pain (right sided ) and thermal sensitivity  Pertinent negatives include no difficulty swallowing, fever, oral bleeding or sinus pressure  She has tried NSAIDs for the symptoms  The treatment provided mild relief  Review of Systems   Review of Systems   Constitutional: Negative for appetite change, chills and fever  HENT: Positive for dental problem  Negative for sinus pressure, sore throat and trouble swallowing  Respiratory: Negative for chest tightness and shortness of breath  Cardiovascular: Negative for chest pain and palpitations  Genitourinary: Negative  Neurological: Positive for headaches           Current Medications       Current Outpatient Medications:     albuterol (2 5 mg/3 mL) 0 083 % nebulizer solution, Take 1 vial (2 5 mg total) by nebulization every 6 (six) hours as needed for wheezing or shortness of breath, Disp: 30 vial, Rfl: 0    albuterol (PROVENTIL HFA,VENTOLIN HFA) 90 mcg/act inhaler, Inhale 2 puffs every 6 (six) hours as needed for wheezing, Disp: 1 Inhaler, Rfl: 0    albuterol (Ventolin HFA) 90 mcg/act inhaler, Inhale 2 puffs every 6 (six) hours as needed for wheezing, Disp: 18 g, Rfl: 0    aluminum-magnesium hydroxide-simethicone (MAALOX MAX) 400-400-40 MG/5ML suspension, Take 10 mL by mouth every 6 (six) hours as needed (Abdominal pain, indigestion), Disp: 355 mL, Rfl: 0    amoxicillin-clavulanate (AUGMENTIN) 875-125 mg per tablet, Take 1 tablet by mouth every 12 (twelve) hours for 10 days, Disp: 20 tablet, Rfl: 0    docusate sodium (COLACE) 100 mg capsule, Take 1 capsule (100 mg total) by mouth every 12 (twelve) hours, Disp: 60 capsule, Rfl: 0    Lidocaine Viscous HCl (XYLOCAINE) 2 % mucosal solution, Swish and spit 15 mL 4 (four) times a day as needed for mouth pain or discomfort, Disp: 100 mL, Rfl: 0    methylPREDNISolone 4 MG tablet therapy pack, Use as directed on package (Patient not taking: Reported on 4/5/2021), Disp: 1 each, Rfl: 0    nitrofurantoin (MACROBID) 100 mg capsule, Take 1 capsule (100 mg total) by mouth 2 (two) times a day, Disp: 10 capsule, Rfl: 0    ondansetron (ZOFRAN) 4 mg tablet, Take 1 tablet (4 mg total) by mouth every 6 (six) hours, Disp: 12 tablet, Rfl: 0    ondansetron (ZOFRAN) 4 mg tablet, Take 1 tablet (4 mg total) by mouth every 6 (six) hours, Disp: 12 tablet, Rfl: 0    ondansetron (ZOFRAN-ODT) 4 mg disintegrating tablet, Take 1 tablet (4 mg total) by mouth every 6 (six) hours as needed for nausea or vomiting, Disp: 20 tablet, Rfl: 0    ondansetron (ZOFRAN-ODT) 4 mg disintegrating tablet, Take 1 tablet (4 mg total) by mouth every 6 (six) hours as needed for nausea or vomiting, Disp: 20 tablet, Rfl: 0    sucralfate (CARAFATE) 1 g tablet, Take 1 tablet (1 g total) by mouth 4 (four) times a day for 10 days, Disp: 40 tablet, Rfl: 0    Current Allergies     Allergies as of 04/08/2021    (No Known Allergies)            The following portions of the patient's history were reviewed and updated as appropriate: allergies, current medications, past family history, past medical history, past social history, past surgical history and problem list      Past Medical History:   Diagnosis Date    Asthma     Heroin abuse ([facility] [address] )        History reviewed  No pertinent surgical history  Family History   Problem Relation Age of Onset    Throat cancer Maternal Grandfather     Asthma Family     Cancer Mother     No Known Problems Father          Medications have been verified  Objective   /64   Pulse 70   Temp 98 2 °F (36 8 °C)   Resp 18   LMP 03/31/2021   SpO2 99%   Patient's last menstrual period was 03/31/2021  Physical Exam     Physical Exam  Constitutional:       General: She is not in acute distress  Appearance: She is well-developed  She is not ill-appearing or diaphoretic     HENT: Head: Normocephalic and atraumatic  Right Ear: Hearing, tympanic membrane, ear canal and external ear normal       Left Ear: Hearing, tympanic membrane, ear canal and external ear normal       Nose: Nose normal  No mucosal edema or rhinorrhea  Mouth/Throat:      Lips: Pink  Mouth: Mucous membranes are moist       Dentition: Abnormal dentition  Dental tenderness, gingival swelling and dental caries present  Pharynx: Uvula midline  No oropharyngeal exudate, posterior oropharyngeal erythema or uvula swelling  Eyes:      General: Lids are normal       Conjunctiva/sclera: Conjunctivae normal       Pupils: Pupils are equal, round, and reactive to light  Neck:      Musculoskeletal: Neck supple  Cardiovascular:      Rate and Rhythm: Normal rate and regular rhythm  Heart sounds: Normal heart sounds, S1 normal and S2 normal  No murmur  Pulmonary:      Effort: Pulmonary effort is normal  No respiratory distress  Breath sounds: Normal breath sounds  No stridor  No decreased breath sounds, wheezing or rales  Abdominal:      General: Bowel sounds are normal       Palpations: Abdomen is soft  Tenderness: There is no abdominal tenderness  Lymphadenopathy:      Cervical: No cervical adenopathy  Skin:     General: Skin is warm and dry  Findings: No rash  Neurological:      Mental Status: She is alert  Psychiatric:         Behavior: Behavior is cooperative

## 2021-04-08 NOTE — LETTER
April 8, 5927     Patient: Karla Atkins   YOB: 1990   Date of Visit: 4/8/2021       To Whom it May Concern:    Susannah Hernandez was seen in my clinic on 4/8/2021  She  A return to work on 04/10/2021    If you have any questions or concerns, please don't hesitate to call           Sincerely,          Rk Hedrick PA-C        CC: No Recipients

## 2021-04-08 NOTE — PATIENT INSTRUCTIONS
Began taking antibiotic as prescribed  Take with probiotic or yogurt to prevent stomach upset  Use viscous lidocaine as needed to help numb affected tooth  Follow-up with primary care physician in 3-5 days for continue symptoms  Further evaluation and ultimate treatment of dental infection will need to be done by dentist   Call and try to schedule appointment   continue to monitor symptoms and proceed directly to the emergency room with any swelling of the lips tongue or mouth, difficulties breathing, worsening pain, inability to tolerate oral intake  Dental Abscess   WHAT YOU NEED TO KNOW:   A dental abscess is a collection of pus in or around a tooth  A dental abscess is caused by bacteria  The bacteria usually enter the tooth when the enamel (outer part of the tooth) is damaged by tooth decay  Bacteria may also enter the tooth through a break or chip in the tooth, or a cut in the gum  Food particles that are stuck between the teeth for a long time may also lead to an abscess  DISCHARGE INSTRUCTIONS:   Return to the emergency department if:   · You have severe pain  · You have trouble breathing because of pain or swelling  Contact your healthcare provider if:   · Your symptoms get worse, even after treatment  · Your mouth is bleeding  · You cannot eat or drink because of pain or swelling  · Your abscess returns  · You have an injury that causes a crack in your tooth  · You have questions or concerns about your condition or care  Medicines: You may  need any of the following:  · Antibiotics  help treat a bacterial infection  · NSAIDs , such as ibuprofen, help decrease swelling, pain, and fever  This medicine is available with or without a doctor's order  NSAIDs can cause stomach bleeding or kidney problems in certain people  If you take blood thinner medicine, always ask your healthcare provider if NSAIDs are safe for you   Always read the medicine label and follow directions  · Acetaminophen  decreases pain and fever  It is available without a doctor's order  Ask how much to take and how often to take it  Follow directions  Read the labels of all other medicines you are using to see if they also contain acetaminophen, or ask your doctor or pharmacist  Acetaminophen can cause liver damage if not taken correctly  Do not use more than 4 grams (4,000 milligrams) total of acetaminophen in one day  · Prescription pain medicine  may be given  Ask your healthcare provider how to take this medicine safely  Some prescription pain medicines contain acetaminophen  Do not take other medicines that contain acetaminophen without talking to your healthcare provider  Too much acetaminophen may cause liver damage  Prescription pain medicine may cause constipation  Ask your healthcare provider how to prevent or treat constipation  · Take your medicine as directed  Contact your healthcare provider if you think your medicine is not helping or if you have side effects  Tell him of her if you are allergic to any medicine  Keep a list of the medicines, vitamins, and herbs you take  Include the amounts, and when and why you take them  Bring the list or the pill bottles to follow-up visits  Carry your medicine list with you in case of an emergency  Self-care:   · Rinse your mouth every 2 hours with salt water  This will help keep the area clean  · Gently brush your teeth twice a day with a soft tooth brush  This will help keep the area clean  · Eat soft foods as directed  Soft foods may cause less pain  Examples include applesauce, yogurt, and cooked pasta  Ask your healthcare provider how long to follow this instruction  · Apply a warm compress to your tooth or gum  Use a cotton ball or gauze soaked in warm water  Remove the compress in 10 minutes or when it becomes cool  Repeat 3 times a day      Prevent another abscess:   · Brush your teeth at least 2 times a day with fluoride toothpaste  · Use dental floss to clean between your teeth at least once a day  · Rinse your mouth with water or mouthwash after meals and snacks  · Chew sugarless gum after meals and snacks  · Limit foods that are sticky and high in sugar such as raisons  Also limit drinks high in sugar, such as soda  · See your dentist every 6 months for dental cleanings and oral exams  Follow up with your healthcare provider in 24 hours: Your healthcare provider will need to check your teeth and gums  Write down your questions so you remember to ask them during your visits  © Copyright [address] Information is for [employer] use only and may not be sold, redistributed or otherwise used for commercial purposes  All illustrations and images included in CareNotes® are the copyrighted property of [employer]  or Barbara Woodard  The above information is an  only  It is not intended as medical advice for individual conditions or treatments  Talk to your doctor, nurse or pharmacist before following any medical regimen to see if it is safe and effective for you

## 2021-04-26 ENCOUNTER — APPOINTMENT (EMERGENCY)
Dept: RADIOLOGY | Facility: HOSPITAL | Age: 31
End: 2021-04-26
Payer: COMMERCIAL

## 2021-04-26 ENCOUNTER — HOSPITAL ENCOUNTER (EMERGENCY)
Facility: HOSPITAL | Age: 31
Discharge: HOME | End: 2021-04-26
Attending: EMERGENCY MEDICINE
Payer: COMMERCIAL

## 2021-04-26 VITALS
HEART RATE: 90 BPM | TEMPERATURE: 98.1 F | WEIGHT: 150.56 LBS | RESPIRATION RATE: 16 BRPM | DIASTOLIC BLOOD PRESSURE: 67 MMHG | OXYGEN SATURATION: 97 % | BODY MASS INDEX: 23.58 KG/M2 | SYSTOLIC BLOOD PRESSURE: 126 MMHG

## 2021-04-26 DIAGNOSIS — S80.212A ABRASION OF LEFT KNEE, INITIAL ENCOUNTER: ICD-10-CM

## 2021-04-26 DIAGNOSIS — M25.562 ACUTE PAIN OF LEFT KNEE: Primary | ICD-10-CM

## 2021-04-26 PROCEDURE — 99283 EMERGENCY DEPT VISIT LOW MDM: CPT

## 2021-04-26 PROCEDURE — 99284 EMERGENCY DEPT VISIT MOD MDM: CPT | Performed by: PHYSICIAN ASSISTANT

## 2021-04-26 PROCEDURE — 73564 X-RAY EXAM KNEE 4 OR MORE: CPT

## 2021-04-26 RX ORDER — CEPHALEXIN 500 MG/1
500 CAPSULE ORAL EVERY 6 HOURS SCHEDULED
Qty: 20 CAPSULE | Refills: 0 | Status: SHIPPED | OUTPATIENT
Start: 2021-04-26 | End: 2021-05-01

## 2021-04-26 RX ORDER — BACITRACIN, NEOMYCIN, POLYMYXIN B 400; 3.5; 5 [USP'U]/G; MG/G; [USP'U]/G
1 OINTMENT TOPICAL ONCE
Status: COMPLETED | OUTPATIENT
Start: 2021-04-26 | End: 2021-04-26

## 2021-04-26 RX ADMIN — BACITRACIN ZINC NEOMYCIN SULFATE POLYMYXIN B SULFATE 1 LARGE APPLICATION: 400; 3.5; 5 OINTMENT TOPICAL at 18:18
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
## 2021-04-26 NOTE — Clinical Note
Radhaleobardo Rosadodle was seen and treated in our emergency department on 4/26/2021  Diagnosis:     Felicitas Purcell    She may return on this date: 04/28/2021         If you have any questions or concerns, please don't hesitate to call        Zheng Ramos RN    ______________________________           _______________          _______________  Hospital Representative                              Date                                Time

## 2021-04-26 NOTE — Clinical Note
Joyce Ramos was seen and treated in our emergency department on 4/26/2021  Diagnosis:     Virgilio Peralta  may return to work on return date  She may return on this date: 04/27/2021         If you have any questions or concerns, please don't hesitate to call        Vishal Kahn PA-C    ______________________________           _______________          _______________  Hospital Representative                              Date                                Time

## 2021-04-26 NOTE — Clinical Note
Shaye Wellington was seen and treated in our emergency department on 4/26/2021  Diagnosis:     Anaestevan Belinda    She may return on this date: 04/28/2021         If you have any questions or concerns, please don't hesitate to call        Vicenta Wild RN    ______________________________           _______________          _______________  Hospital Representative                              Date                                Time

## 2021-04-27 ENCOUNTER — OFFICE VISIT (OUTPATIENT)
Dept: URGENT CARE | Age: 31
End: 2021-04-27
Payer: COMMERCIAL

## 2021-04-27 VITALS
RESPIRATION RATE: 20 BRPM | SYSTOLIC BLOOD PRESSURE: 106 MMHG | HEART RATE: 92 BPM | OXYGEN SATURATION: 93 % | TEMPERATURE: 98.3 F | DIASTOLIC BLOOD PRESSURE: 58 MMHG

## 2021-04-27 DIAGNOSIS — S60.512A ABRASION OF LEFT HAND, INITIAL ENCOUNTER: ICD-10-CM

## 2021-04-27 DIAGNOSIS — J45.20 MILD INTERMITTENT ASTHMA WITHOUT COMPLICATION: ICD-10-CM

## 2021-04-27 DIAGNOSIS — S80.212A ABRASION, LEFT KNEE, INITIAL ENCOUNTER: Primary | ICD-10-CM

## 2021-04-27 PROCEDURE — 99213 OFFICE O/P EST LOW 20 MIN: CPT | Performed by: PHYSICIAN ASSISTANT

## 2021-04-27 RX ORDER — ALBUTEROL SULFATE 90 UG/1
2 AEROSOL, METERED RESPIRATORY (INHALATION) EVERY 6 HOURS PRN
Qty: 8 G | Refills: 0 | Status: SHIPPED | OUTPATIENT
Start: 2021-04-27

## 2021-04-27 NOTE — LETTER
April 27, 3078     Patient: Carlitos Ordoñez   YOB: 1990   Date of Visit: 4/27/2021       To Whom it May Concern:    Bowen Bahena is under my professional care  She was seen in my office on 4/27/2021  She may return to work on 04/29/2021  If you have any questions or concerns, please don't hesitate to call           Sincerely,          Reg Chen PA-C        CC: No Recipients

## 2021-04-27 NOTE — PATIENT INSTRUCTIONS
Left knee and hand abrasions do not look infection  They are healing well  Stop using peroxide on the areas  Just shower normally and allow the warm, soapy water to run down the wounds  Apply the mupirocin ointment twice daily x5 days  Can apply the A&D ointment up to 2x a day until the wound is completely resolved  Refilled albuterol inhaler today  No refills  Follow up with primary care doctor for long term asthma management  If allergy induced take a daily antihistamine such as claritin, zyrtec or allegra
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
## 2021-05-10 ENCOUNTER — HOSPITAL ENCOUNTER (EMERGENCY)
Facility: HOSPITAL | Age: 31
Discharge: HOME | End: 2021-05-10
Attending: EMERGENCY MEDICINE | Admitting: EMERGENCY MEDICINE
Payer: COMMERCIAL

## 2021-05-10 VITALS
OXYGEN SATURATION: 100 % | HEART RATE: 57 BPM | TEMPERATURE: 96.9 F | DIASTOLIC BLOOD PRESSURE: 58 MMHG | RESPIRATION RATE: 18 BRPM | SYSTOLIC BLOOD PRESSURE: 137 MMHG

## 2021-05-10 DIAGNOSIS — F12.90 CANNABINOID HYPEREMESIS SYNDROME: ICD-10-CM

## 2021-05-10 DIAGNOSIS — R11.2 NAUSEA & VOMITING: Primary | ICD-10-CM

## 2021-05-10 DIAGNOSIS — R11.2 CANNABINOID HYPEREMESIS SYNDROME: ICD-10-CM

## 2021-05-10 DIAGNOSIS — E83.42 HYPOMAGNESEMIA: ICD-10-CM

## 2021-05-10 LAB
ALBUMIN SERPL BCP-MCNC: 4.5 G/DL (ref 3–5.2)
ALP SERPL-CCNC: 92 U/L (ref 43–122)
ALT SERPL W P-5'-P-CCNC: 17 U/L
ANION GAP SERPL CALCULATED.3IONS-SCNC: 9 MMOL/L (ref 5–14)
APAP SERPL-MCNC: <10 UG/ML (ref 10–20)
AST SERPL W P-5'-P-CCNC: 22 U/L (ref 14–36)
BASOPHILS # BLD AUTO: 0.1 THOUSANDS/ΜL (ref 0–0.1)
BASOPHILS NFR BLD AUTO: 1 % (ref 0–1)
BILIRUB SERPL-MCNC: 0.94 MG/DL
BUN SERPL-MCNC: 15 MG/DL (ref 5–25)
CALCIUM SERPL-MCNC: 10.4 MG/DL (ref 8.4–10.2)
CHLORIDE SERPL-SCNC: 105 MMOL/L (ref 97–108)
CO2 SERPL-SCNC: 25 MMOL/L (ref 22–30)
CREAT SERPL-MCNC: 0.65 MG/DL (ref 0.6–1.2)
EOSINOPHIL # BLD AUTO: 0.2 THOUSAND/ΜL (ref 0–0.4)
EOSINOPHIL NFR BLD AUTO: 1 % (ref 0–6)
ERYTHROCYTE [DISTWIDTH] IN BLOOD BY AUTOMATED COUNT: 12.5 %
ETHANOL SERPL-MCNC: <10 MG/DL (ref 0–10)
GFR SERPL CREATININE-BSD FRML MDRD: 120 ML/MIN/1.73SQ M
GLUCOSE SERPL-MCNC: 101 MG/DL (ref 70–99)
HCG SERPL QL: NEGATIVE
HCT VFR BLD AUTO: 47.2 % (ref 36–46)
HGB BLD-MCNC: 16.1 G/DL (ref 12–16)
LIPASE SERPL-CCNC: 314 U/L (ref 23–300)
LYMPHOCYTES # BLD AUTO: 2.5 THOUSANDS/ΜL (ref 0.5–4)
LYMPHOCYTES NFR BLD AUTO: 18 % (ref 25–45)
MAGNESIUM SERPL-MCNC: 1.4 MG/DL (ref 1.6–2.3)
MCH RBC QN AUTO: 29.5 PG (ref 26–34)
MCHC RBC AUTO-ENTMCNC: 34.1 G/DL (ref 31–36)
MCV RBC AUTO: 86 FL (ref 80–100)
MONOCYTES # BLD AUTO: 0.4 THOUSAND/ΜL (ref 0.2–0.9)
MONOCYTES NFR BLD AUTO: 3 % (ref 1–10)
NEUTROPHILS # BLD AUTO: 10.5 THOUSANDS/ΜL (ref 1.8–7.8)
NEUTS SEG NFR BLD AUTO: 76 % (ref 45–65)
PLATELET # BLD AUTO: 296 THOUSANDS/UL (ref 150–450)
PMV BLD AUTO: 8.3 FL (ref 8.9–12.7)
POTASSIUM SERPL-SCNC: 4.1 MMOL/L (ref 3.6–5)
PROT SERPL-MCNC: 7.9 G/DL (ref 5.9–8.4)
RBC # BLD AUTO: 5.47 MILLION/UL (ref 4–5.2)
SALICYLATES SERPL-MCNC: <1 MG/DL (ref 10–30)
SODIUM SERPL-SCNC: 139 MMOL/L (ref 137–147)
WBC # BLD AUTO: 13.8 THOUSAND/UL (ref 4.5–11)

## 2021-05-10 PROCEDURE — 96365 THER/PROPH/DIAG IV INF INIT: CPT

## 2021-05-10 PROCEDURE — 99284 EMERGENCY DEPT VISIT MOD MDM: CPT

## 2021-05-10 PROCEDURE — 80143 DRUG ASSAY ACETAMINOPHEN: CPT | Performed by: EMERGENCY MEDICINE

## 2021-05-10 PROCEDURE — 83735 ASSAY OF MAGNESIUM: CPT | Performed by: EMERGENCY MEDICINE

## 2021-05-10 PROCEDURE — 82077 ASSAY SPEC XCP UR&BREATH IA: CPT | Performed by: EMERGENCY MEDICINE

## 2021-05-10 PROCEDURE — 83690 ASSAY OF LIPASE: CPT | Performed by: EMERGENCY MEDICINE

## 2021-05-10 PROCEDURE — 85025 COMPLETE CBC W/AUTO DIFF WBC: CPT | Performed by: EMERGENCY MEDICINE

## 2021-05-10 PROCEDURE — 99284 EMERGENCY DEPT VISIT MOD MDM: CPT | Performed by: EMERGENCY MEDICINE

## 2021-05-10 PROCEDURE — 96375 TX/PRO/DX INJ NEW DRUG ADDON: CPT

## 2021-05-10 PROCEDURE — 96361 HYDRATE IV INFUSION ADD-ON: CPT

## 2021-05-10 PROCEDURE — 84703 CHORIONIC GONADOTROPIN ASSAY: CPT | Performed by: EMERGENCY MEDICINE

## 2021-05-10 PROCEDURE — 80053 COMPREHEN METABOLIC PANEL: CPT | Performed by: EMERGENCY MEDICINE

## 2021-05-10 PROCEDURE — 36415 COLL VENOUS BLD VENIPUNCTURE: CPT | Performed by: EMERGENCY MEDICINE

## 2021-05-10 PROCEDURE — 96372 THER/PROPH/DIAG INJ SC/IM: CPT

## 2021-05-10 PROCEDURE — 80179 DRUG ASSAY SALICYLATE: CPT | Performed by: EMERGENCY MEDICINE

## 2021-05-10 RX ORDER — LORAZEPAM 2 MG/ML
2 INJECTION INTRAMUSCULAR ONCE
Status: COMPLETED | OUTPATIENT
Start: 2021-05-10 | End: 2021-05-10

## 2021-05-10 RX ORDER — DEXTROSE MONOHYDRATE 25 G/50ML
25 INJECTION, SOLUTION INTRAVENOUS ONCE
Status: COMPLETED | OUTPATIENT
Start: 2021-05-10 | End: 2021-05-10

## 2021-05-10 RX ORDER — DIPHENHYDRAMINE HYDROCHLORIDE 50 MG/ML
25 INJECTION INTRAMUSCULAR; INTRAVENOUS ONCE
Status: COMPLETED | OUTPATIENT
Start: 2021-05-10 | End: 2021-05-10

## 2021-05-10 RX ORDER — ONDANSETRON 4 MG/1
4 TABLET, ORALLY DISINTEGRATING ORAL EVERY 6 HOURS PRN
Qty: 8 TABLET | Refills: 0 | Status: SHIPPED | OUTPATIENT
Start: 2021-05-10 | End: 2021-05-12

## 2021-05-10 RX ORDER — METOCLOPRAMIDE HYDROCHLORIDE 5 MG/ML
10 INJECTION INTRAMUSCULAR; INTRAVENOUS ONCE
Status: COMPLETED | OUTPATIENT
Start: 2021-05-10 | End: 2021-05-10

## 2021-05-10 RX ORDER — ONDANSETRON 4 MG/1
4 TABLET, ORALLY DISINTEGRATING ORAL ONCE
Status: COMPLETED | OUTPATIENT
Start: 2021-05-10 | End: 2021-05-10

## 2021-05-10 RX ORDER — LORAZEPAM 2 MG/ML
INJECTION INTRAMUSCULAR
Status: COMPLETED
Start: 2021-05-10 | End: 2021-05-10

## 2021-05-10 RX ORDER — MAGNESIUM SULFATE HEPTAHYDRATE 40 MG/ML
2 INJECTION, SOLUTION INTRAVENOUS ONCE
Status: COMPLETED | OUTPATIENT
Start: 2021-05-10 | End: 2021-05-10

## 2021-05-10 RX ORDER — HALOPERIDOL 5 MG/ML
5 INJECTION INTRAMUSCULAR ONCE
Status: COMPLETED | OUTPATIENT
Start: 2021-05-10 | End: 2021-05-10

## 2021-05-10 RX ADMIN — ONDANSETRON 4 MG: 4 TABLET, ORALLY DISINTEGRATING ORAL at 20:11

## 2021-05-10 RX ADMIN — SODIUM CHLORIDE 1000 ML: 0.9 INJECTION, SOLUTION INTRAVENOUS at 16:16

## 2021-05-10 RX ADMIN — DEXTROSE MONOHYDRATE 25 ML: 500 INJECTION PARENTERAL at 16:05

## 2021-05-10 RX ADMIN — LORAZEPAM 2 MG: 2 INJECTION INTRAMUSCULAR at 16:27

## 2021-05-10 RX ADMIN — LORAZEPAM 2 MG: 2 INJECTION INTRAMUSCULAR; INTRAVENOUS at 16:27

## 2021-05-10 RX ADMIN — MAGNESIUM SULFATE IN WATER 2 G: 40 INJECTION, SOLUTION INTRAVENOUS at 17:23

## 2021-05-10 RX ADMIN — HALOPERIDOL LACTATE 5 MG: 5 INJECTION, SOLUTION INTRAMUSCULAR at 16:11

## 2021-05-10 RX ADMIN — METOCLOPRAMIDE 10 MG: 5 INJECTION, SOLUTION INTRAMUSCULAR; INTRAVENOUS at 16:09

## 2021-05-10 RX ADMIN — DIPHENHYDRAMINE HYDROCHLORIDE 25 MG: 50 INJECTION, SOLUTION INTRAMUSCULAR; INTRAVENOUS at 16:08

## 2021-05-10 NOTE — ED NOTES
Patient sleeping comfortably, no signs or symptoms of distress noted        Ray Bravo RN  05/10/21 1017

## 2021-05-10 NOTE — ED NOTES
Patient was rolling around the floor yelling at staff "you people don't understand, I need help  Somebody help me " Patient spitting and vomiting on the floor, while she has an emesis bag in her hand  Patient redirected on to her bed, housekeeping at bedside cleaning  Tech's at bedside to assist with IV placement as patient told this RN, "I am a chicken with needles get someone to help and hold me"  While this RN was administering medications patient kicked at bedrails and yelling at this RN, security and provider called to bedside as patient was yelling to remove her IV and standing/pacing around room  Patient redirected back to bed, several warm blankets provided  Patient removed cardiac monitor leads, provider aware  No signs or symptoms of distress noted  Patient near nurses station        Tammie Kumar RN  05/10/21 2261

## 2021-05-10 NOTE — ED NOTES
This nurse found 1 0 5 clonazepam tablet on the ground in room  Identified with primary doctor        Maggie Jacobs RN  05/10/21 0209

## 2021-05-10 NOTE — ED PROVIDER NOTES
History  Chief Complaint   Patient presents with    Vomiting     2 days of vomiting     Patient is a 67-year-old female with a history of asthma coming in today for vomiting over the past 48 hours  Patient states that she has had chills but no fevers  No sick contacts  No recent travel no recent surgery  She is unable to keep anything down  She has no hemoptysis or hematemesis  She has abdominal discomfort after vomiting  She has no diarrhea, melena or bright red blood per rectum  Patient did not try anything for this  History provided by:  Patient and EMS personnel   used: No    Vomiting  Severity:  Moderate  Timing:  Constant  Number of daily episodes:  "too many"  Quality:  Unable to specify  Able to tolerate:  Liquids and solids  Progression:  Unchanged  Chronicity:  Recurrent  Recent urination:  Decreased  Context: not post-tussive and not self-induced    Relieved by:  None tried  Worsened by:  Nothing  Ineffective treatments:  None tried  Associated symptoms: abdominal pain and chills    Associated symptoms: no arthralgias, no cough, no diarrhea, no fever, no headaches, no myalgias, no sore throat and no URI    Abdominal pain:     Location:  Generalized    Quality: aching and cramping      Severity:  Mild    Onset quality:  Gradual    Timing:  Constant    Progression:  Waxing and waning    Chronicity:  Recurrent  Risk factors: no alcohol use, no diabetes, not pregnant, no prior abdominal surgery, no sick contacts, no suspect food intake and no travel to endemic areas        Prior to Admission Medications   Prescriptions Last Dose Informant Patient Reported? Taking?    Lidocaine Viscous HCl (XYLOCAINE) 2 % mucosal solution   No No   Sig: Swish and spit 15 mL 4 (four) times a day as needed for mouth pain or discomfort   Patient not taking: Reported on 4/27/2021   albuterol (Proventil HFA) 90 mcg/act inhaler   No No   Sig: Inhale 2 puffs every 6 (six) hours as needed for wheezing aluminum-magnesium hydroxide-simethicone (MAALOX MAX) 400-400-40 MG/5ML suspension   No No   Sig: Take 10 mL by mouth every 6 (six) hours as needed (Abdominal pain, indigestion)   Patient not taking: Reported on 4/27/2021   docusate sodium (COLACE) 100 mg capsule   No No   Sig: Take 1 capsule (100 mg total) by mouth every 12 (twelve) hours   Patient not taking: Reported on 4/27/2021   methylPREDNISolone 4 MG tablet therapy pack   No No   Sig: Use as directed on package   Patient not taking: Reported on 4/5/2021   mupirocin (BACTROBAN) 2 % ointment   No No   Sig: Apply topically 2 (two) times a day for 5 days   nitrofurantoin (MACROBID) 100 mg capsule   No No   Sig: Take 1 capsule (100 mg total) by mouth 2 (two) times a day   Patient not taking: Reported on 4/27/2021   ondansetron (ZOFRAN) 4 mg tablet   No No   Sig: Take 1 tablet (4 mg total) by mouth every 6 (six) hours   Patient not taking: Reported on 4/27/2021   ondansetron (ZOFRAN) 4 mg tablet   No No   Sig: Take 1 tablet (4 mg total) by mouth every 6 (six) hours   Patient not taking: Reported on 4/27/2021   ondansetron (ZOFRAN-ODT) 4 mg disintegrating tablet   No No   Sig: Take 1 tablet (4 mg total) by mouth every 6 (six) hours as needed for nausea or vomiting   Patient not taking: Reported on 4/27/2021   ondansetron (ZOFRAN-ODT) 4 mg disintegrating tablet   No No   Sig: Take 1 tablet (4 mg total) by mouth every 6 (six) hours as needed for nausea or vomiting   Patient not taking: Reported on 4/27/2021   sucralfate (CARAFATE) 1 g tablet   No No   Sig: Take 1 tablet (1 g total) by mouth 4 (four) times a day for 10 days      Facility-Administered Medications: None       Past Medical History:   Diagnosis Date    Asthma     Heroin abuse ([facility] [address] )        History reviewed  No pertinent surgical history      Family History   Problem Relation Age of Onset    Throat cancer Maternal Grandfather     Asthma Family     Cancer Mother     No Known Problems Father      I have reviewed and agree with the history as documented  E-Cigarette/Vaping    E-Cigarette Use Never User      E-Cigarette/Vaping Substances     Social History     Tobacco Use    Smoking status: Current Every Day Smoker     Packs/day: 0 25     Years: 10 00     Pack years: 2 50     Types: Cigarettes    Smokeless tobacco: Never Used   Substance Use Topics    Alcohol use: Never     Frequency: Never    Drug use: Not Currently     Types: Marijuana, Heroin, Fentanyl       Review of Systems   Constitutional: Positive for chills  Negative for fever  HENT: Negative  Negative for ear pain and sore throat  Eyes: Negative  Negative for pain and visual disturbance  Respiratory: Negative  Negative for cough and shortness of breath  Cardiovascular: Negative  Negative for chest pain and palpitations  Gastrointestinal: Positive for abdominal pain and vomiting  Negative for diarrhea  Endocrine: Negative  Genitourinary: Negative  Negative for dysuria and hematuria  Musculoskeletal: Negative  Negative for arthralgias, back pain and myalgias  Skin: Negative  Negative for color change and rash  Neurological: Negative  Negative for seizures, syncope and headaches  Hematological: Negative  Psychiatric/Behavioral: Negative  All other systems reviewed and are negative  Physical Exam  Physical Exam  Vitals signs and nursing note reviewed  Constitutional:       General: She is not in acute distress  Appearance: She is well-developed  Comments: Patient vomiting on exam   Patient appears older stated age  Disheveled appearing   HENT:      Head: Normocephalic and atraumatic  Mouth/Throat:      Mouth: Mucous membranes are dry  Eyes:      Extraocular Movements: Extraocular movements intact  Conjunctiva/sclera: Conjunctivae normal       Pupils: Pupils are equal, round, and reactive to light  Neck:      Musculoskeletal: Neck supple     Cardiovascular:      Rate and Rhythm: Regular rhythm  Tachycardia present  Pulses:           Radial pulses are 2+ on the right side and 2+ on the left side  Heart sounds: No murmur  Pulmonary:      Effort: Pulmonary effort is normal  No respiratory distress  Breath sounds: Normal breath sounds  Abdominal:      Palpations: Abdomen is soft  Tenderness: There is generalized abdominal tenderness  There is no right CVA tenderness, left CVA tenderness, guarding or rebound  Negative signs include Tobias's sign, Rovsing's sign, McBurney's sign, psoas sign and obturator sign  Skin:     General: Skin is warm and dry  Capillary Refill: Capillary refill takes less than 2 seconds  Neurological:      General: No focal deficit present  Mental Status: She is alert and oriented to person, place, and time  GCS: GCS eye subscore is 4  GCS verbal subscore is 5  GCS motor subscore is 6  Cranial Nerves: Cranial nerves are intact  Sensory: Sensation is intact  Motor: Motor function is intact  Coordination: Coordination is intact  Gait: Gait is intact  Psychiatric:         Attention and Perception: Attention and perception normal          Mood and Affect: Mood normal          Behavior: Behavior normal          Thought Content:  Thought content normal          Vital Signs  ED Triage Vitals [05/10/21 1730]   Temperature Pulse Respirations Blood Pressure SpO2   (!) 96 9 °F (36 1 °C) 89 16 130/65 98 %      Temp Source Heart Rate Source Patient Position - Orthostatic VS BP Location FiO2 (%)   Tympanic Monitor Lying Left arm --      Pain Score       --           Vitals:    05/10/21 1730 05/10/21 1946   BP: 130/65 137/58   Pulse: 89 57   Patient Position - Orthostatic VS: Lying Lying         Visual Acuity      ED Medications  Medications   haloperidol lactate (HALDOL) injection 5 mg (5 mg Intramuscular Given 5/10/21 1611)   sodium chloride 0 9 % bolus 1,000 mL (0 mL Intravenous Stopped 5/10/21 1726)   dextrose 50 % IV solution 25 mL (25 mL Intravenous Given 5/10/21 1605)   metoclopramide (REGLAN) injection 10 mg (10 mg Intravenous Given 5/10/21 1609)   diphenhydrAMINE (BENADRYL) injection 25 mg (25 mg Intravenous Given 5/10/21 1608)   sodium chloride 0 9 % bolus 1,000 mL (0 mL Intravenous Stopped 5/10/21 1725)   magnesium sulfate 2 g/50 mL IVPB (premix) 2 g (0 g Intravenous Stopped 5/10/21 1846)   LORazepam (ATIVAN) injection 2 mg (2 mg Intravenous Given 5/10/21 1627)   ondansetron (ZOFRAN-ODT) dispersible tablet 4 mg (4 mg Oral Given 5/10/21 2011)       Diagnostic Studies  Results Reviewed     Procedure Component Value Units Date/Time    hCG, qualitative pregnancy [[id number]]  (Normal) Collected: 05/10/21 1602    Lab Status: Final result Specimen: Blood from Arm, Right Updated: 05/10/21 1639     Preg, Serum Negative    Ethanol [[id number]]  (Normal) Collected: 05/10/21 1602    Lab Status: Final result Specimen: Blood from Arm, Right Updated: 05/10/21 1621     Ethanol Lvl <09 mg/dL     Salicylate level [[id number]]  (Abnormal) Collected: 05/10/21 1602    Lab Status: Final result Specimen: Blood from Arm, Right Updated: 05/37/49 8729     Salicylate Lvl <1 0 mg/dL     Magnesium [[id number]]  (Abnormal) Collected: 05/10/21 1602    Lab Status: Final result Specimen: Blood from Arm, Right Updated: 05/10/21 1621     Magnesium 1 4 mg/dL     Lipase [[id number]]  (Abnormal) Collected: 05/10/21 1602    Lab Status: Final result Specimen: Blood from Arm, Right Updated: 05/10/21 1621     Lipase 314 u/L     Acetaminophen level-If concentration is detectable, please discuss with medical  on call   [[id number]]  (Abnormal) Collected: 05/10/21 1602    Lab Status: Final result Specimen: Blood from Arm, Right Updated: 05/10/21 1621     Acetaminophen Level <10 ug/mL     Comprehensive metabolic panel [[id number]]  (Abnormal) Collected: 05/10/21 1602    Lab Status: Final result Specimen: Blood from Arm, Right Updated: 05/10/21 1621     Sodium 139 mmol/L      Potassium 4 1 mmol/L      Chloride 105 mmol/L      CO2 25 mmol/L      ANION GAP 9 mmol/L      BUN 15 mg/dL      Creatinine 0 65 mg/dL      Glucose 101 mg/dL      Calcium 10 4 mg/dL      AST 22 U/L      ALT 17 U/L      Alkaline Phosphatase 92 U/L      Total Protein 7 9 g/dL      Albumin 4 5 g/dL      Total Bilirubin 0 94 mg/dL      eGFR 120 ml/min/1 73sq m     Narrative:      National Kidney Disease Foundation guidelines for Chronic Kidney Disease (CKD):     Stage 1 with normal or high GFR (GFR > 90 mL/min/1 73 square meters)    Stage 2 Mild CKD (GFR = 60-89 mL/min/1 73 square meters)    Stage 3A Moderate CKD (GFR = 45-59 mL/min/1 73 square meters)    Stage 3B Moderate CKD (GFR = 30-44 mL/min/1 73 square meters)    Stage 4 Severe CKD (GFR = 15-29 mL/min/1 73 square meters)    Stage 5 End Stage CKD (GFR <15 mL/min/1 73 square meters)  Note: GFR calculation is accurate only with a steady state creatinine    CBC and differential [[id number]]  (Abnormal) Collected: 05/10/21 1602    Lab Status: Final result Specimen: Blood from Arm, Right Updated: 05/10/21 1613     WBC 13 80 Thousand/uL      RBC 5 47 Million/uL      Hemoglobin 16 1 g/dL      Hematocrit 47 2 %      MCV 86 fL      MCH 29 5 pg      MCHC 34 1 g/dL      RDW 12 5 %      MPV 8 3 fL      Platelets 368 Thousands/uL      Neutrophils Relative 76 %      Lymphocytes Relative 18 %      Monocytes Relative 3 %      Eosinophils Relative 1 %      Basophils Relative 1 %      Neutrophils Absolute 10 50 Thousands/µL      Lymphocytes Absolute 2 50 Thousands/µL      Monocytes Absolute 0 40 Thousand/µL      Eosinophils Absolute 0 20 Thousand/µL      Basophils Absolute 0 10 Thousands/µL     Rapid drug screen, urine [[id number]]     Lab Status: No result Specimen: Urine     UA (URINE) with reflex to Scope [[id number]]     Lab Status: No result Specimen: Urine                  No orders to display              Procedures  Procedures         ED Course  ED Course as of May 10 2014   Mon May 10, 2021   1541 Patient is a 35year old female coming in today via EMS for nausea vomiting  On exam patient is actively vomiting  Will give Haldol given history of marijuana abuse  Will also check basic labs, urine, give IV fluids and dextrose for help with ketosis      Portions of the record may have been created with voice recognition software  Occasional wrong word or "sound a like" substitutions may have occurred due to the inherent limitations of voice recognition software  Read the chart carefully and recognize, using context, where substitutions have occurred  1550 Patient keeps coming of the room stating that she needs help and she does not witness in the room alone  Multiple attempts by multiple staff members to encourage patient to stay in room  Review of chart patient is seen multiple times for similar events  In February of this year, patient was positive for marijuana heroin  1625 Patient, more agitated  Patient attempting to leave and rib better IV  Discussed with patient that the Reglan can make her more and see  2 mg Ativan given  Patient's magnesium is low and will replace orally  Patient does have mild leukocytosis hemoconcentration consistent with dehydration  Will continue with IV fluid resuscitation      [phone] Patient now sleeping comfortably  Patient maintaining airway and secretions  No stridor   1909 Patient resting in bed  Patient continues to sleep      1937 Patient continues to rest   No vomiting  Pregnancy negative  Will DC home      1953 Patient was found with a Clonazapem pill on her  This was not prescribed  It was thrown away                                SBIRT 20yo+      Most Recent Value   SBIRT (24 yo +)   In order to provide better care to our patients, we are screening all of our patients for alcohol and drug use  Would it be okay to ask you these screening questions?   Unable to answer at this time Filed at: 05/10/2021 1602                    MDM    Disposition  Final diagnoses:   Nausea & vomiting   Hypomagnesemia   Cannabinoid hyperemesis syndrome     Time reflects when diagnosis was documented in both MDM as applicable and the Disposition within this note     Time User Action Codes Description Comment    5/10/2021  4:40 PM Nurys Covarrubias Add [R11 2] Nausea & vomiting     5/10/2021  4:42 PM Amy Sorenson Add [E83 42] Hypomagnesemia     5/10/2021  4:42 PM Marci, [address] [facility] [R11 2,  F12 90] Cannabinoid hyperemesis syndrome       ED Disposition     ED Disposition Condition Date/Time Comment    Discharge Stable Mon May 10, 1957  3:68 PM Mary Kate Coleman discharge to home/self care  Follow-up Information     Follow up With Specialties Details Why Contact Info Additional Information    Nicolas Poe MD Family Medicine   [address] [place] [id number]  [address] [facility] Family Medicine Schedule an appointment as soon as possible for a visit in 1 week  [address], [address] [facility] [id number]  [address] [place] [address], [address], [address], [address]          Patient's Medications   Discharge Prescriptions    MAGNESIUM OXIDE (MAG-OX) 400 MG    Take 1 tablet (400 mg total) by mouth 2 (two) times a day for 5 days       Start Date: 5/10/2021 End Date: 5/15/2021       Order Dose: 400 mg       Quantity: 10 tablet    Refills: 0    ONDANSETRON (ZOFRAN-ODT) 4 MG DISINTEGRATING TABLET    Take 1 tablet (4 mg total) by mouth every 6 (six) hours as needed for nausea or vomiting for up to 2 days       Start Date: 5/10/2021 End Date: 5/12/2021       Order Dose: 4 mg       Quantity: 8 tablet    Refills: 0     No discharge procedures on file      PDMP Review     None          ED Provider  Electronically Signed by           Enrique Ventura DO  05/10/21 2014

## 2021-05-11 ENCOUNTER — HOSPITAL ENCOUNTER (EMERGENCY)
Facility: HOSPITAL | Age: 31
Discharge: HOME | End: 2021-05-11
Attending: EMERGENCY MEDICINE
Payer: COMMERCIAL

## 2021-05-11 VITALS
RESPIRATION RATE: 16 BRPM | WEIGHT: 148 LBS | HEIGHT: 67 IN | TEMPERATURE: 97.8 F | HEART RATE: 88 BPM | SYSTOLIC BLOOD PRESSURE: 112 MMHG | BODY MASS INDEX: 23.23 KG/M2 | OXYGEN SATURATION: 100 % | DIASTOLIC BLOOD PRESSURE: 66 MMHG

## 2021-05-11 DIAGNOSIS — F11.10 HEROIN ABUSE (HCC): Primary | ICD-10-CM

## 2021-05-11 DIAGNOSIS — R11.2 NAUSEA AND VOMITING: ICD-10-CM

## 2021-05-11 DIAGNOSIS — F19.10 SUBSTANCE ABUSE (HCC): ICD-10-CM

## 2021-05-11 DIAGNOSIS — F12.10 MARIJUANA ABUSE: ICD-10-CM

## 2021-05-11 LAB
AMPHETAMINES SERPL QL SCN: NEGATIVE
BARBITURATES UR QL: NEGATIVE
BENZODIAZ UR QL: NEGATIVE
COCAINE UR QL: NEGATIVE
ETHANOL EXG-MCNC: 0 MG/DL
EXT PREG TEST URINE: NEGATIVE
EXT. CONTROL ED NAV: NORMAL
METHADONE UR QL: NEGATIVE
OPIATES UR QL SCN: POSITIVE
OXYCODONE+OXYMORPHONE UR QL SCN: NEGATIVE
PCP UR QL: NEGATIVE
SARS-COV-2 RNA RESP QL NAA+PROBE: NEGATIVE
THC UR QL: POSITIVE

## 2021-05-11 PROCEDURE — 99285 EMERGENCY DEPT VISIT HI MDM: CPT

## 2021-05-11 PROCEDURE — 96372 THER/PROPH/DIAG INJ SC/IM: CPT

## 2021-05-11 PROCEDURE — U0005 INFEC AGEN DETEC AMPLI PROBE: HCPCS | Performed by: EMERGENCY MEDICINE

## 2021-05-11 PROCEDURE — 82075 ASSAY OF BREATH ETHANOL: CPT | Performed by: EMERGENCY MEDICINE

## 2021-05-11 PROCEDURE — U0003 INFECTIOUS AGENT DETECTION BY NUCLEIC ACID (DNA OR RNA); SEVERE ACUTE RESPIRATORY SYNDROME CORONAVIRUS 2 (SARS-COV-2) (CORONAVIRUS DISEASE [COVID-19]), AMPLIFIED PROBE TECHNIQUE, MAKING USE OF HIGH THROUGHPUT TECHNOLOGIES AS DESCRIBED BY CMS-2020-01-R: HCPCS | Performed by: EMERGENCY MEDICINE

## 2021-05-11 PROCEDURE — NC001 PR NO CHARGE: Performed by: EMERGENCY MEDICINE

## 2021-05-11 PROCEDURE — 81025 URINE PREGNANCY TEST: CPT | Performed by: EMERGENCY MEDICINE

## 2021-05-11 PROCEDURE — 99284 EMERGENCY DEPT VISIT MOD MDM: CPT | Performed by: EMERGENCY MEDICINE

## 2021-05-11 PROCEDURE — 80307 DRUG TEST PRSMV CHEM ANLYZR: CPT | Performed by: EMERGENCY MEDICINE

## 2021-05-11 RX ORDER — HALOPERIDOL 5 MG/ML
5 INJECTION INTRAMUSCULAR ONCE
Status: DISCONTINUED | OUTPATIENT
Start: 2021-05-11 | End: 2021-05-11 | Stop reason: HOSPADM

## 2021-05-11 RX ORDER — LORAZEPAM 2 MG/ML
2 INJECTION INTRAMUSCULAR ONCE
Status: COMPLETED | OUTPATIENT
Start: 2021-05-11 | End: 2021-05-11

## 2021-05-11 RX ORDER — ONDANSETRON 4 MG/1
4 TABLET, ORALLY DISINTEGRATING ORAL ONCE
Status: COMPLETED | OUTPATIENT
Start: 2021-05-11 | End: 2021-05-11

## 2021-05-11 RX ADMIN — ONDANSETRON 4 MG: 4 TABLET, ORALLY DISINTEGRATING ORAL at 16:29

## 2021-05-11 RX ADMIN — LORAZEPAM 2 MG: 2 INJECTION INTRAMUSCULAR; INTRAVENOUS at 16:45

## 2021-05-11 NOTE — ED PROVIDER NOTES
Will initially was seen by myself yesterday and return to the emergency department today asking for host   Patient was seen and medically cleared by prior physician  After physician left, patient continue to wonder outside the room without her mask  She states she wants to leave AMA  She states that she wants an IV and IV medication  Discussed with her at this time she is stable hemodynamically with no acute distress  She was witnessed by staff members sticking her finger down her throat to vomit  Attempted to give her ODT Zofran  She states she threw this up  I did give her 2 mg IM Ativan  Patient continues to come other room despite myself since several staff members telling her she cannot walk outside the room without a mask    Patient is alert oriented x3, cranial nerves 2-12 grossly intact  EOMI  PERRLA  No respiratory distress  Neuro intact with no focal deficits  Ambulatory without ataxia  No SI or HI    Multiple attempts at discussing with patient that we can continue to give her oral and IM medications  She is refusing and wishes to leave AMA  Discussed with her that we can not continue to give these until her right appear med arrives  She states she only wants IV medication  Again attempted discussed with her that she is not actively vomiting with hemodynamics stability it will continue to try p o  She reads refusing  Patient's tox screen was positive for opioids and marijuana and pregnancy negative  Breath alcohol negative    5:10 PM  Patient now asking for food and drink  Discussed with her at length that she cannot have anything to eat or drink given the fact that she was just given medications for nauseousness and her complaints of persistent vomiting     6:12 PM  Patient did not require Haldol at this time   Patient now sleeping pending      Caroline Curry DO  05/11/21 8344

## 2021-05-11 NOTE — ED NOTES
Nurys from [facility] has located a bed at [facility]  Patient needs to complete assessment with fanny

## 2021-05-11 NOTE — ED PROVIDER NOTES
History  Chief Complaint   Patient presents with    Overdose - Accidental     pt snorted 2 bags of heroine- would like reahab this time     61-year-old female presenting to the emergency room by EMS seeking rehab  Patient seen in the ER yesterday for nausea vomiting  Admits to using heroin and recreational marijuana  Patient was found to have a pill of Xanax on her yesterday that was not prescribed to her , according to medical records  Patient states that she snorts heroin, last use was this morning where she used to bags  She is sleepy to our evaluation but is maintaining her airway and oxygenation status no evidence of airway compromise  Patient states she seeking rehab  She says she has done a before but did not go through the emergency room to establish good  No suicidal or homicidal ideations  Patient states he has a dry mouth but otherwise has no feelings of nausea vomiting diarrhea abdominal pain, fevers chills shortness of breath cough dysuria frequency vaginal discharge or bleeding  Prior to Admission Medications   Prescriptions Last Dose Informant Patient Reported? Taking?    Lidocaine Viscous HCl (XYLOCAINE) 2 % mucosal solution   No No   Sig: Swish and spit 15 mL 4 (four) times a day as needed for mouth pain or discomfort   Patient not taking: Reported on 4/27/2021   albuterol (Proventil HFA) 90 mcg/act inhaler   No No   Sig: Inhale 2 puffs every 6 (six) hours as needed for wheezing   aluminum-magnesium hydroxide-simethicone (MAALOX MAX) 400-400-40 MG/5ML suspension   No No   Sig: Take 10 mL by mouth every 6 (six) hours as needed (Abdominal pain, indigestion)   Patient not taking: Reported on 4/27/2021   docusate sodium (COLACE) 100 mg capsule   No No   Sig: Take 1 capsule (100 mg total) by mouth every 12 (twelve) hours   Patient not taking: Reported on 4/27/2021   magnesium oxide (MAG-OX) 400 mg   No No   Sig: Take 1 tablet (400 mg total) by mouth 2 (two) times a day for 5 days methylPREDNISolone 4 MG tablet therapy pack   No No   Sig: Use as directed on package   Patient not taking: Reported on 4/5/2021   mupirocin (BACTROBAN) 2 % ointment   No No   Sig: Apply topically 2 (two) times a day for 5 days   nitrofurantoin (MACROBID) 100 mg capsule   No No   Sig: Take 1 capsule (100 mg total) by mouth 2 (two) times a day   Patient not taking: Reported on 4/27/2021   ondansetron (ZOFRAN) 4 mg tablet   No No   Sig: Take 1 tablet (4 mg total) by mouth every 6 (six) hours   Patient not taking: Reported on 4/27/2021   ondansetron (ZOFRAN) 4 mg tablet   No No   Sig: Take 1 tablet (4 mg total) by mouth every 6 (six) hours   Patient not taking: Reported on 4/27/2021   ondansetron (ZOFRAN-ODT) 4 mg disintegrating tablet   No No   Sig: Take 1 tablet (4 mg total) by mouth every 6 (six) hours as needed for nausea or vomiting   Patient not taking: Reported on 4/27/2021   ondansetron (ZOFRAN-ODT) 4 mg disintegrating tablet   No No   Sig: Take 1 tablet (4 mg total) by mouth every 6 (six) hours as needed for nausea or vomiting   Patient not taking: Reported on 4/27/2021   ondansetron (ZOFRAN-ODT) 4 mg disintegrating tablet   No No   Sig: Take 1 tablet (4 mg total) by mouth every 6 (six) hours as needed for nausea or vomiting for up to 2 days   sucralfate (CARAFATE) 1 g tablet   No No   Sig: Take 1 tablet (1 g total) by mouth 4 (four) times a day for 10 days      Facility-Administered Medications: None       Past Medical History:   Diagnosis Date    Asthma     Heroin abuse ([facility] [address] )        History reviewed  No pertinent surgical history  Family History   Problem Relation Age of Onset    Throat cancer Maternal Grandfather     Asthma Family     Cancer Mother     No Known Problems Father      I have reviewed and agree with the history as documented      E-Cigarette/Vaping    E-Cigarette Use Never User      E-Cigarette/Vaping Substances     Social History     Tobacco Use    Smoking status: Current Every Day Smoker     Packs/day: 0 25     Years: 10 00     Pack years: 2 50     Types: Cigarettes    Smokeless tobacco: Never Used   Substance Use Topics    Alcohol use: Never     Frequency: Never    Drug use: Not Currently     Types: Marijuana, Heroin, Fentanyl       Review of Systems   Constitutional: Negative  Negative for chills and fever  HENT: Negative  Negative for rhinorrhea, sore throat, trouble swallowing and voice change  Eyes: Negative  Negative for pain and visual disturbance  Respiratory: Negative  Negative for cough, shortness of breath and wheezing  Cardiovascular: Negative  Negative for chest pain and palpitations  Gastrointestinal: Negative for abdominal pain, diarrhea, nausea and vomiting  Genitourinary: Negative  Negative for dysuria and frequency  Musculoskeletal: Negative  Negative for neck pain and neck stiffness  Skin: Negative  Negative for rash  Neurological: Negative  Negative for dizziness, speech difficulty, weakness, light-headedness and numbness  Physical Exam  Physical Exam  Vitals signs and nursing note reviewed  Constitutional:       General: She is not in acute distress  Appearance: She is well-developed  HENT:      Head: Normocephalic and atraumatic  Eyes:      Conjunctiva/sclera: Conjunctivae normal       Pupils: Pupils are equal, round, and reactive to light  Neck:      Musculoskeletal: Normal range of motion and neck supple  Trachea: No tracheal deviation  Cardiovascular:      Rate and Rhythm: Normal rate and regular rhythm  Pulmonary:      Effort: Pulmonary effort is normal  No respiratory distress  Breath sounds: Normal breath sounds  No wheezing or rales  Abdominal:      General: Bowel sounds are normal  There is no distension  Palpations: Abdomen is soft  Tenderness: There is no abdominal tenderness  There is no guarding or rebound  Musculoskeletal: Normal range of motion           General: No tenderness or deformity  Skin:     General: Skin is warm and dry  Capillary Refill: Capillary refill takes less than 2 seconds  Findings: No rash  Neurological:      Mental Status: She is alert and oriented to person, place, and time  Psychiatric:         Behavior: Behavior normal          Vital Signs  ED Triage Vitals   Temperature Pulse Respirations Blood Pressure SpO2   05/11/21 0922 05/11/21 0922 05/11/21 0922 05/11/21 0922 05/11/21 0922   97 8 °F (36 6 °C) 74 20 145/80 96 %      Temp Source Heart Rate Source Patient Position - Orthostatic VS BP Location FiO2 (%)   05/11/21 0922 05/11/21 0922 05/11/21 0943 05/11/21 0922 --   Tympanic Monitor Lying Left arm       Pain Score       05/11/21 0922       No Pain           Vitals:    05/11/21 0922 05/11/21 0943 05/11/21 1027 05/11/21 1245   BP: 145/80   118/55   Pulse: 74  77 82   Patient Position - Orthostatic VS:  Lying           Visual Acuity  Visual Acuity      Most Recent Value   L Pupil Size (mm)  3   R Pupil Size (mm)  3          ED Medications  Medications - No data to display    Diagnostic Studies  Results Reviewed     Procedure Component Value Units Date/Time    Rapid drug screen, urine [[id number]]  (Abnormal) Collected: 05/11/21 1023    Lab Status: Final result Specimen: Urine, Clean Catch Updated: 05/11/21 1223     Amph/Meth UR Negative     Barbiturate Ur Negative     Benzodiazepine Urine Negative     Cocaine Urine Negative     Methadone Urine Negative     Opiate Urine Positive     PCP Ur Negative     THC Urine Positive     Oxycodone Urine Negative    Narrative:      Presumptive report  If requested, specimen will be sent to reference lab for confirmation  FOR MEDICAL PURPOSES ONLY  IF CONFIRMATION NEEDED PLEASE CONTACT THE LAB WITHIN 5 DAYS      Drug Screen Cutoff Levels:  AMPHETAMINE/METHAMPHETAMINES  1000 ng/mL  BARBITURATES     200 ng/mL  BENZODIAZEPINES     200 ng/mL  COCAINE      300 ng/mL  METHADONE      300 ng/mL  OPIATES      300 ng/mL  PHENCYCLIDINE     25 ng/mL  THC       50 ng/mL  OXYCODONE      100 ng/mL    Novel Coronavirus (Covid-19),PCR [facility] [[id number]]  (Normal) Collected: 05/11/21 0934    Lab Status: Final result Specimen: Nares from Nasopharyngeal Swab Updated: 05/11/21 1139     SARS-CoV-2 Negative    Narrative: The specimen collection materials, transport medium, and/or testing methodology utilized in the production of these test results have been proven to be reliable in a limited validation with an abbreviated program under the Emergency Utilization Authorization provided by the FDA  Testing reported as "Presumptive positive" will be confirmed with secondary testing to ensure result accuracy  Clinical caution and judgement should be used with the interpretation of these results with consideration of the clinical impression and other laboratory testing  Testing reported as "Positive" or "Negative" has been proven to be accurate according to standard laboratory validation requirements  All testing is performed with control materials showing appropriate reactivity at standard intervals  POCT pregnancy, urine [[id number]]  (Normal) Resulted: 05/11/21 1025    Lab Status: Final result Updated: 05/11/21 1027     EXT PREG TEST UR (Ref: Negative) negative     Control valid    POCT alcohol breath test [[id number]]  (Normal) Resulted: 05/11/21 0929    Lab Status: Final result Updated: 05/11/21 0929     EXTBreath Alcohol 0 00                 No orders to display              Procedures  Procedures         ED Course  ED Course as of May 11 1511   Tue May 11, 2021   1050 Patient medically cleared for HOST evaluation  [place] phone consult  SBIRT 22yo+      Most Recent Value   SBIRT (22 yo +)   In order to provide better care to our patients, we are screening all of our patients for alcohol and drug use  Would it be okay to ask you these screening questions?   Yes Filed at: 05/11/2021 0616   Initial Alcohol Screen: US AUDIT-C    1  How often do you have a drink containing alcohol?  0 Filed at: 05/11/2021 0929   2  How many drinks containing alcohol do you have on a typical day you are drinking? 0 Filed at: 05/11/2021 0929   3a  Male UNDER 65: How often do you have five or more drinks on one occasion? 0 Filed at: 05/11/2021 0929   3b  FEMALE Any Age, or MALE 65+: How often do you have 4 or more drinks on one occassion? 0 Filed at: 05/11/2021 0929   Audit-C Score  0 Filed at: 05/11/2021 7531   MEGHANN: How many times in the past year have you    Used an illegal drug or used a prescription medication for non-medical reasons? Daily or Almost Daily Filed at: 05/11/2021 0929   DAST-10: In the past 12 months      1  Have you used drugs other than those required for medical reasons? 1 Filed at: 05/11/2021 0929   2  Do you use more than one drug at a time? 1 Filed at: 05/11/2021 0929   3  Have you had medical problems as a result of your drug use (e g , memory loss, hepatitis, convulsions, bleeding, etc )? 0 Filed at: 05/11/2021 0929   4  Have you had "blackouts" or "flashbacks" as a result of drug use? YesNo  0 Filed at: 05/11/2021 0929   5  Do you ever feel bad or guilty about your drug use? 1 Filed at: 05/11/2021 0929   6  Does your spouse (or parent) ever complain about your involvement with drugs? 1 Filed at: 05/11/2021 0929   7  Have you neglected your family because of your use of drugs? 1 Filed at: 05/11/2021 0929   8  Have you engaged in illegal activities in order to obtain drugs? 0 Filed at: 05/11/2021 0929   9  Have you ever experienced withdrawal symptoms (felt sick) when you stopped taking drugs? 1 Filed at: 05/11/2021 0929   10  Are you always able to stop using drugs when you want to? 1 Filed at: 05/11/2021 2956   DAST-10 Score  (!) 7 Filed at: 05/11/2021 4824                    MDM  Number of Diagnoses or Management Options  Heroin abuse ([facility] [address] ):    Marijuana abuse:   Diagnosis management comments: Patient is hemodynamically stable, I can be medically cleared by me at this point for evaluation once her alcohol level comes back below the legal limit  Patient will be seen by crisis to be evaluated for HOST  Patient is aware that she would not be a bed available for her to go through detox, she will be discharged from the emergency room  No suicidal ideations no homicidal ideations  No grounds to hold the patient against her will  She is stable for discharge if not accepted  Amount and/or Complexity of Data Reviewed  Clinical lab tests: ordered and reviewed        Disposition  Final diagnoses:   Heroin abuse ([facility] )   Marijuana abuse     Time reflects when diagnosis was documented in both MDM as applicable and the Disposition within this note     Time User Action Codes Description Comment    5/11/2021  9:32 AM Savannah Alexandre [F11 10] Heroin abuse ([facility] [address] )     5/11/2021  9:33 AM Savannah Alexandre [F12 10] Marijuana abuse       ED Disposition     None      Follow-up Information    None         Patient's Medications   Discharge Prescriptions    No medications on file     No discharge procedures on file      PDMP Review     None          ED Provider  Electronically Signed by           Katerin Lancaster DO  05/11/21 4402

## 2021-05-11 NOTE — ED NOTES
Patient noted to be sticking fingers in her throat over sink, advised to not do this   Patient continues to do so      [facility]  05/11/21 3515

## 2021-05-11 NOTE — ED NOTES
Contacted HOST for assessment    Insurance   EVS (Eligibility Verification System) called - [phone]  Automated system indicates:  Patient 's insurance is [place] pending    ID # [id number]

## 2021-05-11 NOTE — ED NOTES
Initial pt contact made at discharge  Pt accompanied to triage area where [facility]  is by [id number]        Toño Ordonez RN  05/11/21 3686

## 2021-05-11 NOTE — ED NOTES
Patient continues to wander in the halls, refuses to follow directions   Redirected by multiple staff members      Ioana Goodwin, [facility]  05/11/21 4551

## 2021-05-11 NOTE — ED NOTES
Patient is resting comfortably       [facility], [address] [place] [address]  05/11/21 6330

## 2021-05-11 NOTE — ED NOTES
Patient resting quietly encouraged patient to provide urine sample, given po fluids       Sung Primrose, RN  05/11/21 7290

## 2022-10-24 ENCOUNTER — TELEPHONE (OUTPATIENT)
Dept: OBGYN CLINIC | Facility: CLINIC | Age: 32
End: 2022-10-24

## 2022-10-24 NOTE — TELEPHONE ENCOUNTER
Pt called c/o nausea, sweating, increased discharge and pain with urination  Pt states, "my pee is dark as crap and it hurts to pee"  Appointment scheduled for tomorrow afternoon  New office location provided

## 2025-07-10 ENCOUNTER — HOSPITAL ENCOUNTER (EMERGENCY)
Facility: HOSPITAL | Age: 35
Discharge: HOME | End: 2025-07-10
Attending: EMERGENCY MEDICINE
Payer: COMMERCIAL

## 2025-07-10 ENCOUNTER — APPOINTMENT (EMERGENCY)
Dept: RADIOLOGY | Facility: HOSPITAL | Age: 35
End: 2025-07-10
Payer: COMMERCIAL

## 2025-07-10 VITALS
OXYGEN SATURATION: 96 % | TEMPERATURE: 99 F | RESPIRATION RATE: 16 BRPM | HEART RATE: 73 BPM | DIASTOLIC BLOOD PRESSURE: 54 MMHG | SYSTOLIC BLOOD PRESSURE: 110 MMHG

## 2025-07-10 DIAGNOSIS — S90.859A FOREIGN BODY IN FOOT: Primary | ICD-10-CM

## 2025-07-10 PROCEDURE — 90471 IMMUNIZATION ADMIN: CPT

## 2025-07-10 PROCEDURE — 99283 EMERGENCY DEPT VISIT LOW MDM: CPT

## 2025-07-10 PROCEDURE — 90715 TDAP VACCINE 7 YRS/> IM: CPT | Performed by: EMERGENCY MEDICINE

## 2025-07-10 PROCEDURE — 73630 X-RAY EXAM OF FOOT: CPT

## 2025-07-10 PROCEDURE — 99284 EMERGENCY DEPT VISIT MOD MDM: CPT | Performed by: EMERGENCY MEDICINE

## 2025-07-10 RX ADMIN — TETANUS TOXOID, REDUCED DIPHTHERIA TOXOID AND ACELLULAR PERTUSSIS VACCINE, ADSORBED 0.5 ML: 5; 2.5; 8; 8; 2.5 SUSPENSION INTRAMUSCULAR at 13:30

## 2025-07-10 NOTE — Clinical Note
Alessandra Tolentino was seen and treated in our emergency department on 7/10/2025.                Diagnosis:     Alessandra  may return to work on return date.    She may return on this date: 07/11/2025         If you have any questions or concerns, please don't hesitate to call.      Nikia Aldridge MD    ______________________________           _______________          _______________  Hospital Representative                              Date                                Time

## 2025-07-10 NOTE — ED PROVIDER NOTES
"Time reflects when diagnosis was documented in both MDM as applicable and the Disposition within this note       Time User Action Codes Description Comment    7/10/2025  1:23 PM Nikia Aldridge Add [I70.844Z] Foreign body in foot           ED Disposition       ED Disposition   Discharge    Condition   Stable    Date/Time   Thu Jul 10, 2025  1:22 PM    Comment   Alessandra Tolentino discharge to home/self care.                   Assessment & Plan       Medical Decision Making  35-year-old female presents with concern for glass embedded in her foot.  She stepped on something sharp while walking to the bathroom at home last night.  She was barefoot.  Her partner tried to get the glass out and could feel something with tweezers but was unable to get a  on it.  Unclear source of glass.  Patient is well-appearing with normal vitals on arrival.  Tiny puncture wound visualized in the plantar surface of the right foot near the heel.  No surrounding erythema.  Palpable foreign body with probing.  X-ray obtained shows linear radiopaque foreign body.  Piece of glass was removed with forceps.  Tetanus shot was offered but refused.  Stable for discharge.    Amount and/or Complexity of Data Reviewed  Radiology: ordered.    Risk  Prescription drug management.             Medications   tetanus-diphtheria-acellular pertussis (BOOSTRIX) IM injection 0.5 mL (0.5 mL Intramuscular Given 7/10/25 1330)       ED Risk Strat Scores                    No data recorded                            History of Present Illness       Chief Complaint   Patient presents with    Foreign Body in Skin     Patient reports \"I got glass on my right heel last night\" Denies any other injuries, no bleeding.        Past Medical History[1]   Past Surgical History[2]   Family History[3]   Social History[4]   E-Cigarette/Vaping    E-Cigarette Use Never User       E-Cigarette/Vaping Substances      I have reviewed and agree with the history as documented.     35-year-old " female presents with concern for glass embedded in her foot.  She stepped on something sharp while walking to the bathroom at home last night.  She was barefoot.  Her partner tried to get the glass out and could feel something with tweezers but was unable to get a  on it.  Unclear source of glass.         Review of Systems   All other systems reviewed and are negative.          Objective       ED Triage Vitals [07/10/25 1201]   Temperature Pulse Blood Pressure Respirations SpO2 Patient Position - Orthostatic VS   99 °F (37.2 °C) 73 110/54 16 96 % --      Temp Source Heart Rate Source BP Location FiO2 (%) Pain Score    Oral Monitor Left arm -- --      Vitals      Date and Time Temp Pulse SpO2 Resp BP Pain Score FACES Pain Rating User   07/10/25 1201 99 °F (37.2 °C) 73 96 % 16 110/54 -- -- AH            Physical Exam  Pulmonary:      Effort: Pulmonary effort is normal.     Musculoskeletal:         General: Normal range of motion.      Comments: Small puncture wound to plantar aspect of right foot near the heel     Skin:     General: Skin is warm and dry.      Findings: No erythema.     Neurological:      Mental Status: She is alert.     Psychiatric:         Behavior: Behavior normal.         Results Reviewed       None            XR foot 3+ views RIGHT    (Results Pending)       Foreign Body - Embedded    Date/Time: 7/10/2025 3:08 PM    Performed by: Nikia Aldridge MD  Authorized by: Nikia Aldridge MD    Consent:     Consent obtained:  Verbal  Universal protocol:     Patient identity confirmed:  Verbally with patient  Location:     Location:  Foot    Foot location:  R sole    Depth:  Subcutaneous    Tendon involvement:  None  Pre-procedure details:     Imaging:  X-ray    Neurovascular status: intact    Anesthesia (see MAR for exact dosages):     Anesthesia method:  None  Procedure details:     Localization method:  Probed and visualized    Removal mechanism:  Needle and forceps    Procedure complexity:   Simple    Foreign bodies recovered:  1    Description:  Glass    Intact foreign body removal: yes    Post-procedure details:     Confirmation:  No additional foreign bodies on visualization    Patient tolerance of procedure:  Tolerated well, no immediate complications      ED Medication and Procedure Management   Prior to Admission Medications   Prescriptions Last Dose Informant Patient Reported? Taking?   Lidocaine Viscous HCl (XYLOCAINE) 2 % mucosal solution   No No   Sig: Swish and spit 15 mL 4 (four) times a day as needed for mouth pain or discomfort   Patient not taking: Reported on 4/27/2021   albuterol (Proventil HFA) 90 mcg/act inhaler   No No   Sig: Inhale 2 puffs every 6 (six) hours as needed for wheezing   Patient not taking: Reported on 5/11/2021   aluminum-magnesium hydroxide-simethicone (MAALOX MAX) 400-400-40 MG/5ML suspension   No No   Sig: Take 10 mL by mouth every 6 (six) hours as needed (Abdominal pain, indigestion)   Patient not taking: Reported on 4/27/2021   docusate sodium (COLACE) 100 mg capsule   No No   Sig: Take 1 capsule (100 mg total) by mouth every 12 (twelve) hours   Patient not taking: Reported on 4/27/2021   magnesium oxide (MAG-OX) 400 mg   No No   Sig: Take 1 tablet (400 mg total) by mouth 2 (two) times a day for 5 days   Patient not taking: Reported on 5/11/2021   methylPREDNISolone 4 MG tablet therapy pack   No No   Sig: Use as directed on package   Patient not taking: Reported on 4/5/2021   mupirocin (BACTROBAN) 2 % ointment   No No   Sig: Apply topically 2 (two) times a day for 5 days   nitrofurantoin (MACROBID) 100 mg capsule   No No   Sig: Take 1 capsule (100 mg total) by mouth 2 (two) times a day   Patient not taking: Reported on 4/27/2021   ondansetron (ZOFRAN) 4 mg tablet   No No   Sig: Take 1 tablet (4 mg total) by mouth every 6 (six) hours   Patient not taking: Reported on 4/27/2021   ondansetron (ZOFRAN) 4 mg tablet   No No   Sig: Take 1 tablet (4 mg total) by mouth  every 6 (six) hours   Patient not taking: Reported on 4/27/2021   ondansetron (ZOFRAN-ODT) 4 mg disintegrating tablet   No No   Sig: Take 1 tablet (4 mg total) by mouth every 6 (six) hours as needed for nausea or vomiting   Patient not taking: Reported on 4/27/2021   ondansetron (ZOFRAN-ODT) 4 mg disintegrating tablet   No No   Sig: Take 1 tablet (4 mg total) by mouth every 6 (six) hours as needed for nausea or vomiting   Patient not taking: Reported on 4/27/2021   sucralfate (CARAFATE) 1 g tablet   No No   Sig: Take 1 tablet (1 g total) by mouth 4 (four) times a day for 10 days      Facility-Administered Medications: None     Discharge Medication List as of 7/10/2025  1:23 PM        CONTINUE these medications which have NOT CHANGED    Details   albuterol (Proventil HFA) 90 mcg/act inhaler Inhale 2 puffs every 6 (six) hours as needed for wheezing, Starting Tue 4/27/2021, Normal      aluminum-magnesium hydroxide-simethicone (MAALOX MAX) 400-400-40 MG/5ML suspension Take 10 mL by mouth every 6 (six) hours as needed (Abdominal pain, indigestion), Starting Thu 2/18/2021, Normal      docusate sodium (COLACE) 100 mg capsule Take 1 capsule (100 mg total) by mouth every 12 (twelve) hours, Starting Thu 2/18/2021, Normal      Lidocaine Viscous HCl (XYLOCAINE) 2 % mucosal solution Swish and spit 15 mL 4 (four) times a day as needed for mouth pain or discomfort, Starting Thu 4/8/2021, Normal      magnesium oxide (MAG-OX) 400 mg Take 1 tablet (400 mg total) by mouth 2 (two) times a day for 5 days, Starting Mon 5/10/2021, Until Sat 5/15/2021, Normal      methylPREDNISolone 4 MG tablet therapy pack Use as directed on package, Normal      mupirocin (BACTROBAN) 2 % ointment Apply topically 2 (two) times a day for 5 days, Starting Tue 4/27/2021, Until Sun 5/2/2021, Normal      nitrofurantoin (MACROBID) 100 mg capsule Take 1 capsule (100 mg total) by mouth 2 (two) times a day, Starting Mon 4/5/2021, Normal      !! ondansetron (ZOFRAN)  "4 mg tablet Take 1 tablet (4 mg total) by mouth every 6 (six) hours, Starting Wed 2/17/2021, Normal      !! ondansetron (ZOFRAN) 4 mg tablet Take 1 tablet (4 mg total) by mouth every 6 (six) hours, Starting Thu 2/18/2021, Normal      !! ondansetron (ZOFRAN-ODT) 4 mg disintegrating tablet Take 1 tablet (4 mg total) by mouth every 6 (six) hours as needed for nausea or vomiting, Starting Wed 2/17/2021, Normal      !! ondansetron (ZOFRAN-ODT) 4 mg disintegrating tablet Take 1 tablet (4 mg total) by mouth every 6 (six) hours as needed for nausea or vomiting, Starting Thu 2/18/2021, Normal      sucralfate (CARAFATE) 1 g tablet Take 1 tablet (1 g total) by mouth 4 (four) times a day for 10 days, Starting Thu 2/18/2021, Until Sun 2/28/2021, Normal       !! - Potential duplicate medications found. Please discuss with provider.        No discharge procedures on file.  ED SEPSIS DOCUMENTATION   Time reflects when diagnosis was documented in both MDM as applicable and the Disposition within this note       Time User Action Codes Description Comment    7/10/2025  1:23 PM Nikia Aldridge Add [S90.857K] Foreign body in foot                      [1]   Past Medical History:  Diagnosis Date    Asthma     Heroin abuse (HCC)    [2] No past surgical history on file.  [3]   Family History  Problem Relation Name Age of Onset    Throat cancer Maternal Grandfather      Asthma Family      Cancer Mother      No Known Problems Father     [4]   Social History  Tobacco Use    Smoking status: Every Day     Current packs/day: 0.25     Average packs/day: 0.3 packs/day for 10.0 years (2.5 ttl pk-yrs)     Types: Cigarettes    Smokeless tobacco: Never   Vaping Use    Vaping status: Never Used   Substance Use Topics    Alcohol use: Never    Drug use: Yes     Types: Marijuana, Heroin, Fentanyl, \"Crack\" cocaine        Nikia Aldridge MD  07/10/25 1513    "